# Patient Record
Sex: FEMALE | Race: WHITE
[De-identification: names, ages, dates, MRNs, and addresses within clinical notes are randomized per-mention and may not be internally consistent; named-entity substitution may affect disease eponyms.]

---

## 2019-11-09 ENCOUNTER — HOSPITAL ENCOUNTER (INPATIENT)
Dept: HOSPITAL 47 - EC | Age: 79
LOS: 3 days | Discharge: HOME | DRG: 433 | End: 2019-11-12
Attending: HOSPITALIST | Admitting: HOSPITALIST
Payer: MEDICARE

## 2019-11-09 VITALS — BODY MASS INDEX: 33.4 KG/M2

## 2019-11-09 DIAGNOSIS — E11.9: ICD-10-CM

## 2019-11-09 DIAGNOSIS — Z92.21: ICD-10-CM

## 2019-11-09 DIAGNOSIS — E83.42: ICD-10-CM

## 2019-11-09 DIAGNOSIS — R18.8: ICD-10-CM

## 2019-11-09 DIAGNOSIS — I10: ICD-10-CM

## 2019-11-09 DIAGNOSIS — Z80.0: ICD-10-CM

## 2019-11-09 DIAGNOSIS — F32.9: ICD-10-CM

## 2019-11-09 DIAGNOSIS — Z79.899: ICD-10-CM

## 2019-11-09 DIAGNOSIS — D50.9: ICD-10-CM

## 2019-11-09 DIAGNOSIS — E87.70: ICD-10-CM

## 2019-11-09 DIAGNOSIS — Z79.84: ICD-10-CM

## 2019-11-09 DIAGNOSIS — Z90.11: ICD-10-CM

## 2019-11-09 DIAGNOSIS — Z90.49: ICD-10-CM

## 2019-11-09 DIAGNOSIS — Z82.49: ICD-10-CM

## 2019-11-09 DIAGNOSIS — K74.60: Primary | ICD-10-CM

## 2019-11-09 DIAGNOSIS — E88.09: ICD-10-CM

## 2019-11-09 DIAGNOSIS — R16.1: ICD-10-CM

## 2019-11-09 DIAGNOSIS — Z90.12: ICD-10-CM

## 2019-11-09 DIAGNOSIS — E66.9: ICD-10-CM

## 2019-11-09 DIAGNOSIS — Z71.3: ICD-10-CM

## 2019-11-09 DIAGNOSIS — Z92.3: ICD-10-CM

## 2019-11-09 DIAGNOSIS — D61.818: ICD-10-CM

## 2019-11-09 DIAGNOSIS — J91.8: ICD-10-CM

## 2019-11-09 LAB
ALBUMIN SERPL-MCNC: 3.2 G/DL (ref 3.5–5)
ALP SERPL-CCNC: 63 U/L (ref 38–126)
ALT SERPL-CCNC: 28 U/L (ref 9–52)
ANION GAP SERPL CALC-SCNC: 6 MMOL/L
APTT BLD: 25.1 SEC (ref 22–30)
AST SERPL-CCNC: 30 U/L (ref 14–36)
BASOPHILS # BLD AUTO: 0 K/UL (ref 0–0.2)
BASOPHILS NFR BLD AUTO: 0 %
BUN SERPL-SCNC: 9 MG/DL (ref 7–17)
CALCIUM SPEC-MCNC: 9.4 MG/DL (ref 8.4–10.2)
CHLORIDE SERPL-SCNC: 102 MMOL/L (ref 98–107)
CO2 SERPL-SCNC: 32 MMOL/L (ref 22–30)
EOSINOPHIL # BLD AUTO: 0.1 K/UL (ref 0–0.7)
EOSINOPHIL NFR BLD AUTO: 2 %
ERYTHROCYTE [DISTWIDTH] IN BLOOD BY AUTOMATED COUNT: 4.59 M/UL (ref 3.8–5.4)
ERYTHROCYTE [DISTWIDTH] IN BLOOD: 15.2 % (ref 11.5–15.5)
GLUCOSE BLD-MCNC: 161 MG/DL (ref 75–99)
GLUCOSE BLD-MCNC: 161 MG/DL (ref 75–99)
GLUCOSE SERPL-MCNC: 178 MG/DL (ref 74–99)
HCT VFR BLD AUTO: 36.2 % (ref 34–46)
HGB BLD-MCNC: 11.4 GM/DL (ref 11.4–16)
INR PPP: 1.2 (ref ?–1.2)
LYMPHOCYTES # SPEC AUTO: 0.5 K/UL (ref 1–4.8)
LYMPHOCYTES NFR SPEC AUTO: 13 %
MAGNESIUM SPEC-SCNC: 1.1 MG/DL (ref 1.6–2.3)
MCH RBC QN AUTO: 24.9 PG (ref 25–35)
MCHC RBC AUTO-ENTMCNC: 31.6 G/DL (ref 31–37)
MCV RBC AUTO: 78.9 FL (ref 80–100)
MONOCYTES # BLD AUTO: 0.3 K/UL (ref 0–1)
MONOCYTES NFR BLD AUTO: 8 %
NEUTROPHILS # BLD AUTO: 3 K/UL (ref 1.3–7.7)
NEUTROPHILS NFR BLD AUTO: 76 %
PLATELET # BLD AUTO: 101 K/UL (ref 150–450)
POTASSIUM SERPL-SCNC: 3.6 MMOL/L (ref 3.5–5.1)
PROT SERPL-MCNC: 5.8 G/DL (ref 6.3–8.2)
PT BLD: 12.7 SEC (ref 9–12)
SODIUM SERPL-SCNC: 140 MMOL/L (ref 137–145)
WBC # BLD AUTO: 4 K/UL (ref 3.8–10.6)

## 2019-11-09 PROCEDURE — 85730 THROMBOPLASTIN TIME PARTIAL: CPT

## 2019-11-09 PROCEDURE — 82103 ALPHA-1-ANTITRYPSIN TOTAL: CPT

## 2019-11-09 PROCEDURE — 71046 X-RAY EXAM CHEST 2 VIEWS: CPT

## 2019-11-09 PROCEDURE — 88108 CYTOPATH CONCENTRATE TECH: CPT

## 2019-11-09 PROCEDURE — 99285 EMERGENCY DEPT VISIT HI MDM: CPT

## 2019-11-09 PROCEDURE — 82728 ASSAY OF FERRITIN: CPT

## 2019-11-09 PROCEDURE — 88341 IMHCHEM/IMCYTCHM EA ADD ANTB: CPT

## 2019-11-09 PROCEDURE — 87205 SMEAR GRAM STAIN: CPT

## 2019-11-09 PROCEDURE — 80053 COMPREHEN METABOLIC PANEL: CPT

## 2019-11-09 PROCEDURE — 96375 TX/PRO/DX INJ NEW DRUG ADDON: CPT

## 2019-11-09 PROCEDURE — 84165 PROTEIN E-PHORESIS SERUM: CPT

## 2019-11-09 PROCEDURE — 86038 ANTINUCLEAR ANTIBODIES: CPT

## 2019-11-09 PROCEDURE — 96366 THER/PROPH/DIAG IV INF ADDON: CPT

## 2019-11-09 PROCEDURE — 83540 ASSAY OF IRON: CPT

## 2019-11-09 PROCEDURE — 83690 ASSAY OF LIPASE: CPT

## 2019-11-09 PROCEDURE — 83516 IMMUNOASSAY NONANTIBODY: CPT

## 2019-11-09 PROCEDURE — 83550 IRON BINDING TEST: CPT

## 2019-11-09 PROCEDURE — 83735 ASSAY OF MAGNESIUM: CPT

## 2019-11-09 PROCEDURE — 82042 OTHER SOURCE ALBUMIN QUAN EA: CPT

## 2019-11-09 PROCEDURE — 36415 COLL VENOUS BLD VENIPUNCTURE: CPT

## 2019-11-09 PROCEDURE — 96365 THER/PROPH/DIAG IV INF INIT: CPT

## 2019-11-09 PROCEDURE — 89050 BODY FLUID CELL COUNT: CPT

## 2019-11-09 PROCEDURE — 85025 COMPLETE CBC W/AUTO DIFF WBC: CPT

## 2019-11-09 PROCEDURE — 83880 ASSAY OF NATRIURETIC PEPTIDE: CPT

## 2019-11-09 PROCEDURE — 93306 TTE W/DOPPLER COMPLETE: CPT

## 2019-11-09 PROCEDURE — 84484 ASSAY OF TROPONIN QUANT: CPT

## 2019-11-09 PROCEDURE — 82105 ALPHA-FETOPROTEIN SERUM: CPT

## 2019-11-09 PROCEDURE — 82390 ASSAY OF CERULOPLASMIN: CPT

## 2019-11-09 PROCEDURE — 82607 VITAMIN B-12: CPT

## 2019-11-09 PROCEDURE — 87070 CULTURE OTHR SPECIMN AEROBIC: CPT

## 2019-11-09 PROCEDURE — 86039 ANTINUCLEAR ANTIBODIES (ANA): CPT

## 2019-11-09 PROCEDURE — 85610 PROTHROMBIN TIME: CPT

## 2019-11-09 PROCEDURE — 93005 ELECTROCARDIOGRAM TRACING: CPT

## 2019-11-09 PROCEDURE — 74177 CT ABD & PELVIS W/CONTRAST: CPT

## 2019-11-09 PROCEDURE — 49083 ABD PARACENTESIS W/IMAGING: CPT

## 2019-11-09 PROCEDURE — 86376 MICROSOMAL ANTIBODY EACH: CPT

## 2019-11-09 PROCEDURE — 88342 IMHCHEM/IMCYTCHM 1ST ANTB: CPT

## 2019-11-09 PROCEDURE — 87075 CULTR BACTERIA EXCEPT BLOOD: CPT

## 2019-11-09 PROCEDURE — 88305 TISSUE EXAM BY PATHOLOGIST: CPT

## 2019-11-09 PROCEDURE — 84157 ASSAY OF PROTEIN OTHER: CPT

## 2019-11-09 PROCEDURE — 80048 BASIC METABOLIC PNL TOTAL CA: CPT

## 2019-11-09 RX ADMIN — MAGNESIUM SULFATE IN DEXTROSE SCH MLS/HR: 10 INJECTION, SOLUTION INTRAVENOUS at 12:59

## 2019-11-09 RX ADMIN — MAGNESIUM SULFATE IN DEXTROSE SCH MLS/HR: 10 INJECTION, SOLUTION INTRAVENOUS at 14:09

## 2019-11-09 RX ADMIN — HEPARIN SODIUM SCH UNIT: 5000 INJECTION, SOLUTION INTRAVENOUS; SUBCUTANEOUS at 20:10

## 2019-11-09 RX ADMIN — FUROSEMIDE SCH MG: 10 INJECTION, SOLUTION INTRAMUSCULAR; INTRAVENOUS at 20:10

## 2019-11-09 RX ADMIN — FAMOTIDINE SCH MG: 10 INJECTION, SOLUTION INTRAVENOUS at 20:10

## 2019-11-09 NOTE — XR
EXAMINATION TYPE: XR chest 2V

 

DATE OF EXAM: 11/9/2019

 

HISTORY: Chest Pain.

 

REFERENCE: NONE.

 

FINDINGS: Heart size is upper limits of normal.

 

There is bibasilar airspace disease. There are bilateral effusions, greater on the left than the righ
t.

 

IMPRESSION: 

1. BORDERLINE CARDIOMEGALY.

2. BIBASILAR AIRSPACE DISEASE.

3. BILATERAL EFFUSIONS, GREATER ON THE LEFT THAN THE RIGHT.

## 2019-11-09 NOTE — CT
EXAMINATION TYPE: CT abdomen pelvis w con

 

DATE OF EXAM: 11/9/2019

 

REFERENCE: NONE

 

HISTORY: gen. pain/distention

HISTORY: General pain and distention

 

CT DLP: 2005.6 mGy

Automated exposure control for dose reduction was used.

 

TECHNIQUE: Helical acquisition through the abdomen and pelvis was obtained following the oral ingesti
on of without Oral Contrast and following intravenous administration of 100 mL of Isovue 300. The kimberly
a was reformatted in axial, coronal and sagittal projections.

 

FINDINGS:  There are bilateral pleural effusions, greater on the left than the right. There is relaxa
tion atelectasis both lung bases, greater on the left than the right. The heart is mildly enlarged. T
here is no pericardial fluid. There is coronary artery calcification as well as other vascular calcif
ications.

 

There is diffuse anasarca throughout the study but most marked in the lower abdomen and pelvis.

 

There are umbilical varices. There are also gastrosplenic varices. There are small paraesophageal kenney
ices.

 

There is moderate ascites.

 

The gallbladder is been removed. The liver is nodule in keeping with cirrhosis. The spleen is upper l
imits of normal in size measuring 14 cm the liver is normal in size.

 

Both adrenal glands are normal.

 

Both kidneys demonstrate function and appear morphologically normal.

 

The pancreas is unremarkable.

 

There is no significant retroperitoneal, iliac or inguinal adenopathy.

 

The bladder is unremarkable.

 

There are calcifications associated with the uterus. The ovaries are not seen with certainty.

 

There is no significant diverticular change and there is no radiographic evidence of diverticulitis. 
The appendix is not seen with certainty.

 

Small bowel loops are normal in caliber.

 

No free air is seen.

 

There is degenerative disc disease at L5-S1. There is mild facet arthropathy and hypertrophic spondyl
osis in the lower dorsal spine.

 

IMPRESSION: 

1. EVIDENCE OF CIRRHOSIS AND MARKED ASCITES.

2. 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

3. MILD CARDIOMEGALY.

4. BILATERAL PLEURAL EFFUSIONS, GREATER ON THE LEFT THAN THE RIGHT.

5. DIFFUSE ANASARCA.

6. DEGENERATIVE CHANGES WITHIN THE SPINE.

## 2019-11-09 NOTE — ECHOF
Referral Reason:CHF



MEASUREMENTS

--------

HEIGHT: 167.6 cm

WEIGHT: 104.3 kg

BP: 

RVIDd:   3.5 cm     (< 3.3)

IVSd:   1.3 cm     (0.6 - 1.1)

LVIDd:   4.1 cm     (3.9 - 5.3)

LVPWd:   1.1 cm     (0.6 - 1.1)

IVSs:   2.0 cm

LVIDs:   1.6 cm

LVPWs:   1.6 cm

Ao Diam:   2.9 cm     (2.0 - 3.7)

AV Cusp:   1.3 cm     (1.5 - 2.6)

LA Diam:   4.1 cm     (2.7 - 3.8)

MV EXCURSION:   7.495 mm     (> 18.000)

MV EF SLOPE:   48 mm/s     (70 - 150)

EPSS:   0.6 cm

MV E Henry:   1.01 m/s

MV DecT:   153 ms

MV A Henry:   1.49 m/s

MV E/A Ratio:   0.68 

AV maxP.34 mmHg

AV meanP.65 mmHg

RAP:   5.00 mmHg

RVSP:   46.31 mmHg







FINDINGS

--------

Resting tachycardia (HR>100bpm).

This was a technically difficult study with suboptimal views.

The left ventricular size is normal.   There is mild concentric left ventricular hypertrophy.   Overa
ll left ventricular systolic function is normal with, an EF between 55 - 60 %.

The right ventricle is mildly enlarged.

The left atrium is mildly dilated.

The right atrial size is normal.

5.0mg of Lumason was utilized for enhancement of images

Aortic valve is trileaflet and is mildly thickened.   There is mild aortic stenosis present.   Peak/m
aranza gradient across the Aortic Valve is 25.34mmHg / 14.65mmHg.

The mitral valve was not well visualized.   Mild mitral regurgitation is present.

The tricuspid valve was not well visualized.   Mild tricuspid regurgitation present.   There is mild 
pulmonary hypertension.

There is no pulmonic regurgitation present.

The aortic root size is normal.

IVC Not well visulized.

There is no pericardial effusion.



CONCLUSIONS

--------

1. Resting tachycardia (HR>100bpm).

2. This was a technically difficult study with suboptimal views.

3. The left ventricular size is normal.

4. There is mild concentric left ventricular hypertrophy.

5. Overall left ventricular systolic function is normal with, an EF between 55 - 60 %.

6. The right ventricle is mildly enlarged.

7. The left atrium is mildly dilated.

8. The right atrial size is normal.

9. 5.0mg of Lumason was utilized for enhancement of images

10. Aortic valve is trileaflet and is mildly thickened.

11. There is mild aortic stenosis present.

12. Peak/mean gradient across the Aortic Valve is 25.34mmHg / 14.65mmHg.

13. The mitral valve was not well visualized.

14. Mild mitral regurgitation is present.

15. The tricuspid valve was not well visualized.

16. Mild tricuspid regurgitation present.

17. There is mild pulmonary hypertension.

18. There is no pulmonic regurgitation present.

19. The aortic root size is normal.

20. IVC Not well visulized.

21. There is no pericardial effusion.





SONOGRAPHER: Devora Ruvalcaba RDCS

## 2019-11-09 NOTE — ED
Abdominal Pain HPI





- General


Chief Complaint: Abdominal Pain


Stated Complaint: Stomach pain


Time Seen by Provider: 11/09/19 10:20


Source: patient, family, RN notes reviewed


Mode of arrival: wheelchair


Limitations: no limitations





- History of Present Illness


Initial Comments: 





77 yo female history of hypertension diabetes presenting with 6 month history of

generalized abdominal pain, fluid overload and mild dyspnea  Patient has a 

general discomfort which is constant in nature throughout her entire abdomen.  

She also reports abdominal distention and bloating.  She's had decreased 

appetite.  She's had nausea without vomiting.  She's had intermittent solid 

stool and diarrhea for the past 6 months.  Denies any rectal bleeding.  Denies 

generalized weight loss.  Denies fever or chills.  Denies chest pain.  Denies 

flank pain or dysuria.





- Related Data


                                Home Medications











 Medication  Instructions  Recorded  Confirmed


 


Cholecalciferol (Vitamin D3) 2,000 unit PO DAILY 11/09/19 11/09/19





[Vitamin D3]   


 


Lisinopril [Zestril] 10 mg PO DAILY 11/09/19 11/09/19


 


Pioglitazone [Actos] 30 mg PO DAILY 11/09/19 11/09/19


 


Sertraline [Zoloft] 50 mg PO DAILY 11/09/19 11/09/19


 


metFORMIN HCL 1,000 mg PO BID 11/09/19 11/09/19


 


sitaGLIPtin PHOSPHATE [Januvia] 100 mg PO DAILY 11/09/19 11/09/19











                                    Allergies











Allergy/AdvReac Type Severity Reaction Status Date / Time


 


No Known Allergies Allergy   Verified 11/09/19 12:51














Review of Systems


ROS Statement: 


Those systems with pertinent positive or pertinent negative responses have been 

documented in the HPI.





ROS Other: All systems not noted in ROS Statement are negative.





Past Medical History


Past Medical History: Cancer, Diabetes Mellitus, Hypertension


Additional Past Medical History / Comment(s): breast ca,


History of Any Multi-Drug Resistant Organisms: None Reported


Past Surgical History: Breast Surgery, Cholecystectomy


Past Psychological History: Depression


Smoking Status: Never smoker


Past Alcohol Use History: None Reported


Past Drug Use History: None Reported





General Exam


Limitations: no limitations


General appearance: alert, in no apparent distress


Head exam: Present: atraumatic, normocephalic


Eye exam: Present: normal appearance, PERRL


ENT exam: Present: normal exam, normal oropharynx


Neck exam: Present: normal inspection.  Absent: tenderness, meningismus


Respiratory exam: Present: normal lung sounds bilaterally.  Absent: respiratory 

distress, wheezes, rales, rhonchi


Cardiovascular Exam: Present: tachycardia, irregular rhythm


GI/Abdominal exam: Present: soft, distended, tenderness (Very mild generalized 

tenderness palpation).  Absent: guarding, rebound


Extremities exam: Present: pedal edema (1+ pedal bilaterally)


Neurological exam: Present: alert, oriented X3, CN II-XII intact.  Absent: motor

 sensory deficit


Psychiatric exam: Present: normal affect, normal mood


Skin exam: Present: warm, dry, intact.  Absent: cyanosis, diaphoretic





Course


                                   Vital Signs











  11/09/19





  10:11


 


Temperature 98.1 F


 


Pulse Rate 112 H


 


Respiratory 18





Rate 


 


Blood Pressure 141/66


 


O2 Sat by Pulse 98





Oximetry 














- Reevaluation(s)


Reevaluation #1: 





11/09/19 1028


EKG: Sinus tachycardia with PAC no ST segment elevation, rate of 122, SC 

interval 160, QRS duration 72, 





Medical Decision Making





- Medical Decision Making





78 -year-old female with abdominal distention, peripheral edema, mild dyspnea.  

On exam patient does appear fluid overloaded.  She has no focal abdominal 

tenderness.  She has bilateral peripheral edema.  Chest x-rays obtained which is

 consistent with fluid overload, bilateral effusions.  CT the abdomen is 

performed which does show ascites, no other acute findings.  Laboratory studies 

reveal hypomagnesemia, mild hypoalbuminemia.  Patient will be admitted for 

diuresis, echo will be obtained to evaluate for cardiogenic edema and congestive

 heart failure.  Patient and family agreeable with plan.  Case discussed with 

admitting physician.





- Lab Data


Result diagrams: 


                                 11/09/19 11:03 11/09/19 11:03


                                   Lab Results











  11/09/19 11/09/19 11/09/19 Range/Units





  11:03 11:03 11:03 


 


WBC  4.0    (3.8-10.6)  k/uL


 


RBC  4.59    (3.80-5.40)  m/uL


 


Hgb  11.4    (11.4-16.0)  gm/dL


 


Hct  36.2    (34.0-46.0)  %


 


MCV  78.9 L    (80.0-100.0)  fL


 


MCH  24.9 L    (25.0-35.0)  pg


 


MCHC  31.6    (31.0-37.0)  g/dL


 


RDW  15.2    (11.5-15.5)  %


 


Plt Count  101 L    (150-450)  k/uL


 


Neutrophils %  76    %


 


Lymphocytes %  13    %


 


Monocytes %  8    %


 


Eosinophils %  2    %


 


Basophils %  0    %


 


Neutrophils #  3.0    (1.3-7.7)  k/uL


 


Lymphocytes #  0.5 L    (1.0-4.8)  k/uL


 


Monocytes #  0.3    (0-1.0)  k/uL


 


Eosinophils #  0.1    (0-0.7)  k/uL


 


Basophils #  0.0    (0-0.2)  k/uL


 


Hypochromasia  Slight    


 


PT     (9.0-12.0)  sec


 


INR     (<1.2)  


 


APTT     (22.0-30.0)  sec


 


Sodium   140   (137-145)  mmol/L


 


Potassium   3.6   (3.5-5.1)  mmol/L


 


Chloride   102   ()  mmol/L


 


Carbon Dioxide   32 H   (22-30)  mmol/L


 


Anion Gap   6   mmol/L


 


BUN   9   (7-17)  mg/dL


 


Creatinine   0.57   (0.52-1.04)  mg/dL


 


Est GFR (CKD-EPI)AfAm   >90   (>60 ml/min/1.73 sqM)  


 


Est GFR (CKD-EPI)NonAf   >90   (>60 ml/min/1.73 sqM)  


 


Glucose   178 H   (74-99)  mg/dL


 


Calcium   9.4   (8.4-10.2)  mg/dL


 


Magnesium   1.1 L   (1.6-2.3)  mg/dL


 


Total Bilirubin   1.1   (0.2-1.3)  mg/dL


 


AST   30   (14-36)  U/L


 


ALT   28   (9-52)  U/L


 


Alkaline Phosphatase   63   ()  U/L


 


Troponin I     (0.000-0.034)  ng/mL


 


NT-Pro-B Natriuret Pep    778  pg/mL


 


Total Protein   5.8 L   (6.3-8.2)  g/dL


 


Albumin   3.2 L   (3.5-5.0)  g/dL


 


Lipase   108   ()  U/L














  11/09/19 11/09/19 Range/Units





  11:03 11:03 


 


WBC    (3.8-10.6)  k/uL


 


RBC    (3.80-5.40)  m/uL


 


Hgb    (11.4-16.0)  gm/dL


 


Hct    (34.0-46.0)  %


 


MCV    (80.0-100.0)  fL


 


MCH    (25.0-35.0)  pg


 


MCHC    (31.0-37.0)  g/dL


 


RDW    (11.5-15.5)  %


 


Plt Count    (150-450)  k/uL


 


Neutrophils %    %


 


Lymphocytes %    %


 


Monocytes %    %


 


Eosinophils %    %


 


Basophils %    %


 


Neutrophils #    (1.3-7.7)  k/uL


 


Lymphocytes #    (1.0-4.8)  k/uL


 


Monocytes #    (0-1.0)  k/uL


 


Eosinophils #    (0-0.7)  k/uL


 


Basophils #    (0-0.2)  k/uL


 


Hypochromasia    


 


PT  12.7 H   (9.0-12.0)  sec


 


INR  1.2 H   (<1.2)  


 


APTT  25.1   (22.0-30.0)  sec


 


Sodium    (137-145)  mmol/L


 


Potassium    (3.5-5.1)  mmol/L


 


Chloride    ()  mmol/L


 


Carbon Dioxide    (22-30)  mmol/L


 


Anion Gap    mmol/L


 


BUN    (7-17)  mg/dL


 


Creatinine    (0.52-1.04)  mg/dL


 


Est GFR (CKD-EPI)AfAm    (>60 ml/min/1.73 sqM)  


 


Est GFR (CKD-EPI)NonAf    (>60 ml/min/1.73 sqM)  


 


Glucose    (74-99)  mg/dL


 


Calcium    (8.4-10.2)  mg/dL


 


Magnesium    (1.6-2.3)  mg/dL


 


Total Bilirubin    (0.2-1.3)  mg/dL


 


AST    (14-36)  U/L


 


ALT    (9-52)  U/L


 


Alkaline Phosphatase    ()  U/L


 


Troponin I   <0.012  (0.000-0.034)  ng/mL


 


NT-Pro-B Natriuret Pep    pg/mL


 


Total Protein    (6.3-8.2)  g/dL


 


Albumin    (3.5-5.0)  g/dL


 


Lipase    ()  U/L














Disposition


Clinical Impression: 


 Abdominal pain, Liver cirrhosis, Fluid overload





Disposition: ADMITTED AS IP TO THIS HOSP


Condition: Stable


Is patient prescribed a controlled substance at d/c from ED?: No


Referrals: 


Jordan Malave MD [Primary Care Provider] - 1-2 days


Decision to Admit Reason: Admit from EC


Decision Date: 11/09/19


Decision Time: 13:42

## 2019-11-09 NOTE — P.HPIM
History of Present Illness





This is a pleasant 78 years old female with past medical history of diabetes 

mellitus, hypertension, breast cancer status post right breast lump removal.  

Presents because of abdominal pain, fluid overload and mild dyspnea, pt states 

she is coming because of worsening abd pain , central radiating on both sides , 

non specific, about 8/10 , now 2/10 in severity , associated with nausea but not

vomiting , and some dyspnea , no fever 


she has regular bowel movement compared to yesterday which was more loose. pt 

denies chest pain or dyspnea


pt has no known liver , kidney or heart disease before 





Vital showing tachycardia with heart rate 100 to 112, blood pressure 125/52, 

patient is afebrile and she is saturating 92 on room air.  Labs showing 

unremarkable CBC, INR and BMP.  Magnesium is low at 1.1


CT of the abdomen and pelvis: Anasarca with bilateral pleural effusion and 

ascites.  Echo showed ejection fraction of 55-60%.  EKG showed sinus tachycardia

at 122 with no significant ST-T changes.  Q-wave in the inferior leads III and 

aVF.  Chest x-ray showed bilateral pleural effusion more on the left side














Review of Systems





CONSTITUTIONAL: No fever, no malaise, no fatigue. 


HEENT: No recent visual problems or hearing problems. Denied any sore throat. 


CARDIOVASCULAR: No  orthopnea, PND, no palpitations, no syncope. 


PULMONARY: No shortness of breath, no cough, no hemoptysis. 


GASTROINTESTINAL: No diarrhea, no nausea, no vomiting, no abdominal pain. 

Normoactive bowel sounds. 


NEUROLOGICAL: No headaches, no weakness, no numbness. 


HEMATOLOGICAL: Denies any bleeding or petechiae. 


GENITOURINARY: Denies any burning micturition, frequency, or urgency. 


MUSCULOSKELETAL/RHEUMATOLOGICAL: Denies any joint pain, swelling, or any muscle 

pain. 


ENDOCRINE: Denies any polyuria or polydipsia.








Past Medical History


Past Medical History: Cancer, Diabetes Mellitus, Hypertension


Additional Past Medical History / Comment(s): breast ca,


History of Any Multi-Drug Resistant Organisms: None Reported


Past Surgical History: Breast Surgery, Cholecystectomy


Additional Past Surgical History / Comment(s): Right breast lump removal


Past Anesthesia/Blood Transfusion Reactions: No Reported Reaction


Past Psychological History: Depression


Smoking Status: Never smoker


Past Alcohol Use History: None Reported


Past Drug Use History: None Reported





- Past Family History


  ** Father


Family Medical History: Cancer


Additional Family Medical History / Comment(s): Colon cancer





  ** Brother(s)


Family Medical History: Myocardial Infarction (MI)


Additional Family Medical History / Comment(s): stents, another brother passed 

away from an MI





Medications and Allergies


                                Home Medications











 Medication  Instructions  Recorded  Confirmed  Type


 


Cholecalciferol (Vitamin D3) 2,000 unit PO DAILY 11/09/19 11/09/19 History





[Vitamin D3]    


 


Lisinopril [Zestril] 10 mg PO DAILY 11/09/19 11/09/19 History


 


Pioglitazone [Actos] 30 mg PO DAILY 11/09/19 11/09/19 History


 


Sertraline [Zoloft] 50 mg PO DAILY 11/09/19 11/09/19 History


 


metFORMIN HCL 1,000 mg PO BID 11/09/19 11/09/19 History


 


sitaGLIPtin PHOSPHATE [Januvia] 100 mg PO DAILY 11/09/19 11/09/19 History








                                    Allergies











Allergy/AdvReac Type Severity Reaction Status Date / Time


 


No Known Allergies Allergy   Verified 11/09/19 12:51














Physical Exam


Vitals: 


                                   Vital Signs











  Temp Pulse Resp BP Pulse Ox


 


 11/09/19 14:00   100  18  125/52  92 L


 


 11/09/19 13:30   106 H  18  108/51  94 L


 


 11/09/19 13:00   105 H  18  160/67  91 L


 


 11/09/19 12:30   104 H  20  162/69  92 L


 


 11/09/19 10:11  98.1 F  112 H  18  141/66  98








                                Intake and Output











 11/09/19 11/09/19 11/09/19





 06:59 14:59 22:59


 


Other:   


 


  Voiding Method  Toilet 


 


  Weight  100.561 kg 

















GENERAL: The patient is alert and oriented x3, not in any acute distress. obese


HEENT: Pupils are round and equally reacting to light. EOMI. No scleral icterus.

 No conjunctival pallor. Normocephalic, atraumatic. No pharyngeal erythema. No 

thyromegaly. 


CARDIOVASCULAR: S1 and S2 present. No murmurs, rubs, or gallops. 


PULMONARY: Chest is clear to auscultation, no wheezing or crackles. 


-ABDOMEN: Soft, mild RUQ tenderness,  nondistended, normoactive bowel sounds. No

 palpable organomegaly. 


MUSCULOSKELETAL: No joint swelling or deformity. 


EXTREMITIES: No cyanosis, clubbing, or pedal edema. 


NEUROLOGICAL: Gross neurological examination did not reveal any focal deficits. 


SKIN: No rashes. No petechiae








Results


CBC & Chem 7: 


                                 11/09/19 11:03





                                 11/09/19 11:03


Labs: 


                  Abnormal Lab Results - Last 24 Hours (Table)











  11/09/19 11/09/19 11/09/19 Range/Units





  11:03 11:03 11:03 


 


MCV  78.9 L    (80.0-100.0)  fL


 


MCH  24.9 L    (25.0-35.0)  pg


 


Plt Count  101 L    (150-450)  k/uL


 


Lymphocytes #  0.5 L    (1.0-4.8)  k/uL


 


PT    12.7 H  (9.0-12.0)  sec


 


INR    1.2 H  (<1.2)  


 


Carbon Dioxide   32 H   (22-30)  mmol/L


 


Glucose   178 H   (74-99)  mg/dL


 


Magnesium   1.1 L   (1.6-2.3)  mg/dL


 


Total Protein   5.8 L   (6.3-8.2)  g/dL


 


Albumin   3.2 L   (3.5-5.0)  g/dL














Thrombosis Risk Factor Assmnt





- Choose All That Apply


Each Risk Factor Represents 3 Points: Age 75 years or older


Other congenital or acquired thrombophilia - If yes, enter type in comment: No


Thrombosis Risk Factor Assessment Total Risk Factor Score: 3


Thrombosis Risk Factor Assessment Level: Moderate Risk





Assessment and Plan


Assessment: 





Diffuse anasarca With bilateral pleural effusion, more on the left,moderate 

ascites


Liver cirrhosis


Hypomagnesemia


Borderline Enlarged spleen 


diabetes mellitus


Hypertension


History of breast cancer status post lumpectomy 








Plan: 





This is a pleasant 78 years old female who presents with anasarca, with pleural 

effusion and ascites mostly secondary to liver cirrhosis with normal ejection 

fraction and creatinine.  Continue with diuretics.  Monitor electrolytes.  Con

sult GI team 


Labs and medication were reviewed..  Continue same treatment.  Continue with 

symptomatic treatment.  Resume home medication.  Monitor lytes and vitals.  DVT 

and GI prophylaxis.  Further recommendations of the clinical course of the 

patient


DVT prophylaxis: Subcutaneous heparin


GI Prophylaxis: Pepcid


PT/OT: Pending


Prognosis is guarded

## 2019-11-10 LAB
ANION GAP SERPL CALC-SCNC: 4 MMOL/L
BUN SERPL-SCNC: 9 MG/DL (ref 7–17)
CALCIUM SPEC-MCNC: 8.6 MG/DL (ref 8.4–10.2)
CELLS COUNTED: 100
CHLORIDE SERPL-SCNC: 100 MMOL/L (ref 98–107)
CO2 SERPL-SCNC: 36 MMOL/L (ref 22–30)
EOSINOPHIL # BLD MANUAL: 0.08 K/UL (ref 0–0.7)
ERYTHROCYTE [DISTWIDTH] IN BLOOD BY AUTOMATED COUNT: 3.93 M/UL (ref 3.8–5.4)
ERYTHROCYTE [DISTWIDTH] IN BLOOD: 15.3 % (ref 11.5–15.5)
GLUCOSE BLD-MCNC: 149 MG/DL (ref 75–99)
GLUCOSE BLD-MCNC: 154 MG/DL (ref 75–99)
GLUCOSE BLD-MCNC: 171 MG/DL (ref 75–99)
GLUCOSE BLD-MCNC: 178 MG/DL (ref 75–99)
GLUCOSE SERPL-MCNC: 146 MG/DL (ref 74–99)
HCT VFR BLD AUTO: 31.1 % (ref 34–46)
HGB BLD-MCNC: 10.1 GM/DL (ref 11.4–16)
LYMPHOCYTES # BLD MANUAL: 0.28 K/UL (ref 1–4.8)
MAGNESIUM SPEC-SCNC: 1.3 MG/DL (ref 1.6–2.3)
MCH RBC QN AUTO: 25.6 PG (ref 25–35)
MCHC RBC AUTO-ENTMCNC: 32.4 G/DL (ref 31–37)
MCV RBC AUTO: 79.1 FL (ref 80–100)
MONOCYTES # BLD MANUAL: 0.2 K/UL (ref 0–1)
NEUTROPHILS NFR BLD MANUAL: 80 %
PLATELET # BLD AUTO: 78 K/UL (ref 150–450)
POTASSIUM SERPL-SCNC: 3.5 MMOL/L (ref 3.5–5.1)
SODIUM SERPL-SCNC: 140 MMOL/L (ref 137–145)
WBC # BLD AUTO: 2.8 K/UL (ref 3.8–10.6)

## 2019-11-10 RX ADMIN — MAGNESIUM SULFATE IN DEXTROSE SCH MLS/HR: 10 INJECTION, SOLUTION INTRAVENOUS at 09:13

## 2019-11-10 RX ADMIN — LISINOPRIL SCH MG: 10 TABLET ORAL at 09:14

## 2019-11-10 RX ADMIN — MAGNESIUM SULFATE IN DEXTROSE SCH MLS/HR: 10 INJECTION, SOLUTION INTRAVENOUS at 13:29

## 2019-11-10 RX ADMIN — SERTRALINE HYDROCHLORIDE SCH MG: 50 TABLET, FILM COATED ORAL at 09:14

## 2019-11-10 RX ADMIN — Medication SCH UNIT: at 09:14

## 2019-11-10 RX ADMIN — FAMOTIDINE SCH MG: 10 INJECTION, SOLUTION INTRAVENOUS at 20:18

## 2019-11-10 RX ADMIN — HEPARIN SODIUM SCH UNIT: 5000 INJECTION, SOLUTION INTRAVENOUS; SUBCUTANEOUS at 20:18

## 2019-11-10 RX ADMIN — FUROSEMIDE SCH MG: 10 INJECTION, SOLUTION INTRAMUSCULAR; INTRAVENOUS at 09:14

## 2019-11-10 RX ADMIN — FUROSEMIDE SCH MG: 10 INJECTION, SOLUTION INTRAMUSCULAR; INTRAVENOUS at 20:18

## 2019-11-10 RX ADMIN — HEPARIN SODIUM SCH UNIT: 5000 INJECTION, SOLUTION INTRAVENOUS; SUBCUTANEOUS at 09:14

## 2019-11-10 RX ADMIN — LINAGLIPTIN SCH MG: 5 TABLET, FILM COATED ORAL at 09:14

## 2019-11-10 RX ADMIN — FAMOTIDINE SCH MG: 10 INJECTION, SOLUTION INTRAVENOUS at 09:14

## 2019-11-10 RX ADMIN — SPIRONOLACTONE SCH MG: 25 TABLET, FILM COATED ORAL at 13:30

## 2019-11-10 RX ADMIN — MAGNESIUM SULFATE IN DEXTROSE SCH MLS/HR: 10 INJECTION, SOLUTION INTRAVENOUS at 11:49

## 2019-11-10 RX ADMIN — PIOGLITAZONE SCH MG: 30 TABLET ORAL at 09:14

## 2019-11-10 NOTE — P.CONS
History of Present Illness





- Reason for Consult


Consult date: 11/10/19


Cirrhosis


Requesting physician: Sly William





- Chief Complaint


Abdominal pain





- History of Present Illness





78-year-old female with medical history significant for diabetes mellitus, 

hypertension, prior right breast lumpectomy presents to the hospital due to 

complaints of abdominal pain.  The patient reports abdominal pain which has been

present with associated distention which is present for months now.  However 

over the past few weeks this has increased.  She describes symptoms of abdominal

distention and fluid overload.  She also reports associated shortness of breath.

 No fevers chills or other symptoms reported.  She did have some associated 

nausea.  No prior history of liver disease.  She denies any excessive alcohol 

use.  She does suffer from diabetes mellitus and hypertension as stated.  

Computed tomography scan of the abdomen on presentation was significant for 

cirrhosis, ascites as well as umbilical and gastrosplenic varices.  Laboratory 

evaluation significant for hemoglobin 10.1 from 11.4 with microcytic indices, 

platelet count 70,000, WBC 2.8 with a total bilirubin 1.1, alkaline phosphatase 

69, AST 30 and ALT 28.  Currently she is lying in bed reporting improvement in 

abdominal symptoms after starting Lasix IV 40 mg twice daily.








Review of Systems





REVIEW OF SYSTEMS:


CONSTITUTIONAL: Denies any fevers, chills, weight change or fatigue.


CARDIOVASCULAR: Denies any chest pain, palpitations high or low blood pressures


RESPIRATORY: Denies any hemoptysis or cough, but did have some shortness of 

breath associated with her abdominal distention.  


GENITOURINARY:  No dysuria or hematuria. 


MUSCULOSKELETAL: No weakness reported. 


SKIN: Denies any new rashes or lesions, jaundice or pallor. 


PSYCHIATRIC: Denies any depression or anxiety. 


NEUROLOGY: Denies headache, denies any new focal deficits. 


EARS/NOSE/THROAT: No recent hearing change, congestion, nasal discharge or sore 

throat.


EYES: No pain in eyes, discharge or change in vision. 


GASTROINTESTINAL: As per HPI.





Past Medical History


Past Medical History: Cancer, Diabetes Mellitus, Hypertension


Additional Past Medical History / Comment(s): breast ca,


History of Any Multi-Drug Resistant Organisms: None Reported


Past Surgical History: Breast Surgery, Cholecystectomy


Additional Past Surgical History / Comment(s): Right breast lump removal


Past Anesthesia/Blood Transfusion Reactions: No Reported Reaction


Past Psychological History: Depression


Smoking Status: Never smoker


Past Alcohol Use History: None Reported


Past Drug Use History: None Reported





- Past Family History


  ** Father


Family Medical History: Cancer


Additional Family Medical History / Comment(s): Colon cancer





  ** Brother(s)


Family Medical History: Myocardial Infarction (MI)


Additional Family Medical History / Comment(s): stents, another brother passed 

away from an MI





Medications and Allergies


                                Home Medications











 Medication  Instructions  Recorded  Confirmed  Type


 


Cholecalciferol (Vitamin D3) 2,000 unit PO DAILY 11/09/19 11/09/19 History





[Vitamin D3]    


 


Lisinopril [Zestril] 10 mg PO DAILY 11/09/19 11/09/19 History


 


Pioglitazone [Actos] 30 mg PO DAILY 11/09/19 11/09/19 History


 


Sertraline [Zoloft] 50 mg PO DAILY 11/09/19 11/09/19 History


 


metFORMIN HCL 1,000 mg PO BID 11/09/19 11/09/19 History


 


sitaGLIPtin PHOSPHATE [Januvia] 100 mg PO DAILY 11/09/19 11/09/19 History








                                    Allergies











Allergy/AdvReac Type Severity Reaction Status Date / Time


 


No Known Allergies Allergy   Verified 11/09/19 12:51














Physical Exam


Vitals: 


                                   Vital Signs











  Temp Pulse Pulse Resp BP BP BP


 


 11/10/19 08:20  98.3 F   96  16   108/53 


 


 11/10/19 03:57    103 H  17    154/74


 


 11/09/19 23:48    91  17    127/59


 


 11/09/19 20:00  98.2 F   106 H  17    127/60


 


 11/09/19 14:45  98.5 F   100  16   121/57 


 


 11/09/19 14:00   100   18  125/52  


 


 11/09/19 13:30   106 H   18  108/51  


 


 11/09/19 13:00   105 H   18  160/67  


 


 11/09/19 12:30   104 H   20  162/69  


 


 11/09/19 10:11  98.1 F  112 H   18  141/66  














  Pulse Ox


 


 11/10/19 08:20  94 L


 


 11/10/19 03:57  95


 


 11/09/19 23:48  95


 


 11/09/19 20:00  97


 


 11/09/19 14:45  94 L


 


 11/09/19 14:00  92 L


 


 11/09/19 13:30  94 L


 


 11/09/19 13:00  91 L


 


 11/09/19 12:30  92 L


 


 11/09/19 10:11  98








                                Intake and Output











 11/09/19 11/10/19 11/10/19





 22:59 06:59 14:59


 


Output Total 1200 2200 


 


Balance -1200 -2200 


 


Output:   


 


  Urine 1200 2200 


 


Other:   


 


  # Voids 1  2


 


  # Bowel Movements   1


 


  Weight  93.9 kg 














On physical examination, patient appears comfortable in no apparent distress. 


HEAD: Normocephalic, atraumatic. 


EYES: No scleral icterus. No conjunctival injection. 


MOUTH: No lesions, tongue midline. 


NECK: Trachea midline, no gross abnormalities. 


CHEST: Clear to auscultation with no wheezing or rhonchi appreciated. 


HEART: S1-S2 appreciated. 


ABDOMEN: Soft, moderately distended with positive fluid wave. Bowel sounds are p

ositive. No organomegaly.  No guarding or rigidity.


EXTREMITIES: No pedal edema. 


SKIN: No rashes, no jaundice. 


NEUROLOGIC: Alert and oriented x3.  No focal deficits. 





Results


CBC & Chem 7: 


                                 11/10/19 05:46





                                 11/10/19 05:46


Labs: 


                  Abnormal Lab Results - Last 24 Hours (Table)











  11/09/19 11/09/19 11/09/19 Range/Units





  11:03 11:03 11:03 


 


WBC     (3.8-10.6)  k/uL


 


Hgb     (11.4-16.0)  gm/dL


 


Hct     (34.0-46.0)  %


 


MCV  78.9 L    (80.0-100.0)  fL


 


MCH  24.9 L    (25.0-35.0)  pg


 


Plt Count  101 L    (150-450)  k/uL


 


Lymphocytes #  0.5 L    (1.0-4.8)  k/uL


 


Lymphocytes # (Manual)     (1.0-4.8)  k/uL


 


PT    12.7 H  (9.0-12.0)  sec


 


INR    1.2 H  (<1.2)  


 


Carbon Dioxide   32 H   (22-30)  mmol/L


 


Glucose   178 H   (74-99)  mg/dL


 


POC Glucose (mg/dL)     (75-99)  mg/dL


 


Magnesium   1.1 L   (1.6-2.3)  mg/dL


 


Total Protein   5.8 L   (6.3-8.2)  g/dL


 


Albumin   3.2 L   (3.5-5.0)  g/dL














  11/09/19 11/09/19 11/10/19 Range/Units





  16:57 20:32 05:46 


 


WBC     (3.8-10.6)  k/uL


 


Hgb     (11.4-16.0)  gm/dL


 


Hct     (34.0-46.0)  %


 


MCV     (80.0-100.0)  fL


 


MCH     (25.0-35.0)  pg


 


Plt Count     (150-450)  k/uL


 


Lymphocytes #     (1.0-4.8)  k/uL


 


Lymphocytes # (Manual)     (1.0-4.8)  k/uL


 


PT     (9.0-12.0)  sec


 


INR     (<1.2)  


 


Carbon Dioxide    36 H  (22-30)  mmol/L


 


Glucose    146 H  (74-99)  mg/dL


 


POC Glucose (mg/dL)  161 H  161 H   (75-99)  mg/dL


 


Magnesium    1.3 L  (1.6-2.3)  mg/dL


 


Total Protein     (6.3-8.2)  g/dL


 


Albumin     (3.5-5.0)  g/dL














  11/10/19 11/10/19 Range/Units





  05:46 06:02 


 


WBC  2.8 L   (3.8-10.6)  k/uL


 


Hgb  10.1 L   (11.4-16.0)  gm/dL


 


Hct  31.1 L   (34.0-46.0)  %


 


MCV  79.1 L   (80.0-100.0)  fL


 


MCH    (25.0-35.0)  pg


 


Plt Count  78 L   (150-450)  k/uL


 


Lymphocytes #    (1.0-4.8)  k/uL


 


Lymphocytes # (Manual)  0.28 L   (1.0-4.8)  k/uL


 


PT    (9.0-12.0)  sec


 


INR    (<1.2)  


 


Carbon Dioxide    (22-30)  mmol/L


 


Glucose    (74-99)  mg/dL


 


POC Glucose (mg/dL)   149 H  (75-99)  mg/dL


 


Magnesium    (1.6-2.3)  mg/dL


 


Total Protein    (6.3-8.2)  g/dL


 


Albumin    (3.5-5.0)  g/dL











CT scan - abdomen: report reviewed (Findings of ascites and cirrhosis on 

computed tomography scan abdomen)





Assessment and Plan


(1) Liver cirrhosis


Narrative/Plan: 


78-year-old female presenting due to abdominal pain with no findings of liver 

cirrhosis and ascites on computed tomography scan of the abdomen.  No prior 

history of liver disease.  No history of viral hepatitis or decompensated liver 

disease reported.  The patient reports increasing abdominal distention over the 

past few months of unknown etiology.  She is currently not on any home diuresis 

but has been started on Lasix 40 mg twice daily in the hospital.  At this time 

plan is to poor paracentesis with fluid studies to confirm liver etiology of 

ascites, suspicion is for nonalcoholic steatohepatitis as etiology of liver 

disease.


Current Visit: Yes   Status: Acute   Code(s): K74.60 - UNSPECIFIED CIRRHOSIS OF 

LIVER   SNOMED Code(s): 89812970


   





(2) Abdominal pain


Current Visit: Yes   Status: Acute   Code(s): R10.9 - UNSPECIFIED ABDOMINAL PAIN

   SNOMED Code(s): 93163842


   





(3) Fluid overload


Current Visit: Yes   Status: Acute   Code(s): E87.70 - FLUID OVERLOAD, 

UNSPECIFIED   SNOMED Code(s): 29824819


   


Plan: 





Supportive care


Sodium restricted diet


Lasix 40 mg twice a day


Aldactone 25 mg daily added 


Paracentesis with fluid studies ordered


Will order full liver serology to rule out other etiology of her disease, 

however suspicion is for nonalcoholic steatohepatitis


Continue to monitor CBC, CMP and INR


Follow-up in gastroenterology clinic after discharge


Thank you for allowing us to participate in the care of this patient we will 

continue to follow

## 2019-11-10 NOTE — P.PN
Subjective





This is a pleasant 78 years old female with past medical history of diabetes 

mellitus, hypertension, breast cancer status post right breast lump removal.  

Presents because of abdominal pain, fluid overload and mild dyspnea, pt states 

she is coming because of worsening abd pain , central radiating on both sides , 

non specific, about 8/10 , now 2/10 in severity , associated with nausea but not

vomiting , and some dyspnea , no fever 


she has regular bowel movement compared to yesterday which was more loose. pt 

denies chest pain or dyspnea


pt has no known liver , kidney or heart disease before 





Vital showing tachycardia with heart rate 100 to 112, blood pressure 125/52, 

patient is afebrile and she is saturating 92 on room air.  Labs showing 

unremarkable CBC, INR and BMP.  Magnesium is low at 1.1


CT of the abdomen and pelvis: Anasarca with bilateral pleural effusion and 

ascites.  Echo showed ejection fraction of 55-60%.  EKG showed sinus tachycardia

at 122 with no significant ST-T changes.  Q-wave in the inferior leads III and 

aVF.  Chest x-ray showed bilateral pleural effusion more on the left side





11/10/2019


Patient is awake and oriented.  No new complaints.  No chest pain or dyspnea.  

She feels better and better presents with easier mobility.  Her abdominal pain 

is improving, occasional nausea but no vomiting.  Vitals are stable, her CBC 

showing mild pancytopenia with WBC of 2.8, hemoglobin 10.1, platelets at 78, 

magnesium still low with 1.3 which is being replaced, sugar is controlled and 

BMP is unremarkable.  Discussed the case with GI team, plan to add 

spironolactone and paracentesis tomorrow.


Patient was instructed to follow up with her PCP and GI within 1 week after 

discharge, she agrees however she does not want to follow up with her PCP Dr. Malave and she wants to be switched to another PCP, several doctors were 

suggested











Review of systems


CONSTITUTIONAL: No fever, no malaise, no fatigue. 


HEENT: No recent visual problems or hearing problems. Denied any sore throat. 


CARDIOVASCULAR: No  orthopnea, PND, no palpitations, no syncope. 


PULMONARY: No shortness of breath, no cough, no hemoptysis. 


GASTROINTESTINAL: No diarrhea, no nausea, no vomiting, no abdominal pain. 

Normoactive bowel sounds. 


NEUROLOGICAL: No headaches, no weakness, no numbness. 


HEMATOLOGICAL: Denies any bleeding or petechiae. 


GENITOURINARY: Denies any burning micturition, frequency, or urgency. 


MUSCULOSKELETAL/RHEUMATOLOGICAL: Denies any joint pain, swelling, or any muscle 

pain. 


ENDOCRINE: Denies any polyuria or polydipsia.


                               Active Medications











Generic Name Dose Route Start Last Admin





  Trade Name Freq  PRN Reason Stop Dose Admin


 


Cholecalciferol  2,000 unit  11/10/19 09:00  11/10/19 09:14





  Vitamin D3 (25 Mcg = 1000 Iu)  PO   2,000 unit





  DAILY KHUSHI   Administration


 


Famotidine  20 mg  11/09/19 21:00  11/10/19 09:14





  Pepcid  IV   20 mg





  Q12HR KHUSHI   Administration


 


Furosemide  40 mg  11/09/19 21:00  11/10/19 09:14





  Lasix  IV   40 mg





  Q12HR KHUSHI   Administration


 


Heparin Sodium (Porcine)  5,000 unit  11/09/19 21:00  11/10/19 09:14





  Heparin  SQ   5,000 unit





  Q12HR KHUSHI   Administration


 


Linagliptin  5 mg  11/10/19 09:00  11/10/19 09:14





  Tradjenta  PO   5 mg





  DAILY KHUSHI   Administration


 


Lisinopril  10 mg  11/10/19 09:00  11/10/19 09:14





  Zestril  PO   10 mg





  DAILY KHUSHI   Administration


 


Metformin HCl  1,000 mg  11/09/19 17:30  11/10/19 06:32





  Glucophage  PO   1,000 mg





  AC-BID KHUSHI   Administration


 


Miscellaneous Information  1 each  11/10/19 07:35 





  Magnesium Per Protocol  MISCELLANE  





  DAILY PRN  





  Per Protocol  





  Protocol  


 


Naloxone HCl  0.2 mg  11/09/19 13:38 





  Narcan  IV  





  Q2M PRN  





  Opioid Reversal  


 


Pioglitazone HCl  30 mg  11/10/19 09:00  11/10/19 09:14





  Actos  PO   30 mg





  DAILY KHUSHI   Administration


 


Sertraline HCl  50 mg  11/10/19 09:00  11/10/19 09:14





  Zoloft  PO   50 mg





  DAILY KHUSHI   Administration














Objective





- Vital Signs


Vital signs: 


                                   Vital Signs











Temp  98.3 F   11/10/19 08:20


 


Pulse  96   11/10/19 08:20


 


Resp  16   11/10/19 08:20


 


BP  108/53   11/10/19 08:20


 


Pulse Ox  94 L  11/10/19 08:20








                                 Intake & Output











 11/09/19 11/10/19 11/10/19





 18:59 06:59 18:59


 


Output Total  3400 


 


Balance  -3400 


 


Weight 100.561 kg 93.9 kg 


 


Output:   


 


  Urine  3400 


 


Other:   


 


  Voiding Method Toilet  


 


  # Voids 1  2


 


  # Bowel Movements   1














- Exam








GENERAL: The patient is alert and oriented x3, not in any acute distress. obese


HEENT: Pupils are round and equally reacting to light. EOMI. No scleral icterus.

No conjunctival pallor. Normocephalic, atraumatic. No pharyngeal erythema. No 

thyromegaly. 


CARDIOVASCULAR: S1 and S2 present. No murmurs, rubs, or gallops. 


PULMONARY: Chest is clear to auscultation, no wheezing or crackles. 


-ABDOMEN: Soft, mild RUQ tenderness,  nondistended, normoactive bowel sounds. No

palpable organomegaly. 


MUSCULOSKELETAL: No joint swelling or deformity. 


-EXTREMITIES: No cyanosis, clubbing.  Bilateral leg edema


NEUROLOGICAL: Gross neurological examination did not reveal any focal deficits. 


SKIN: No rashes. No petechiae





























- Labs


CBC & Chem 7: 


                                 11/10/19 05:46





                                 11/10/19 05:46


Labs: 


                  Abnormal Lab Results - Last 24 Hours (Table)











  11/09/19 11/09/19 11/09/19 Range/Units





  11:03 11:03 11:03 


 


WBC     (3.8-10.6)  k/uL


 


Hgb     (11.4-16.0)  gm/dL


 


Hct     (34.0-46.0)  %


 


MCV  78.9 L    (80.0-100.0)  fL


 


MCH  24.9 L    (25.0-35.0)  pg


 


Plt Count  101 L    (150-450)  k/uL


 


Lymphocytes #  0.5 L    (1.0-4.8)  k/uL


 


Lymphocytes # (Manual)     (1.0-4.8)  k/uL


 


PT    12.7 H  (9.0-12.0)  sec


 


INR    1.2 H  (<1.2)  


 


Carbon Dioxide   32 H   (22-30)  mmol/L


 


Glucose   178 H   (74-99)  mg/dL


 


POC Glucose (mg/dL)     (75-99)  mg/dL


 


Magnesium   1.1 L   (1.6-2.3)  mg/dL


 


Total Protein   5.8 L   (6.3-8.2)  g/dL


 


Albumin   3.2 L   (3.5-5.0)  g/dL














  11/09/19 11/09/19 11/10/19 Range/Units





  16:57 20:32 05:46 


 


WBC     (3.8-10.6)  k/uL


 


Hgb     (11.4-16.0)  gm/dL


 


Hct     (34.0-46.0)  %


 


MCV     (80.0-100.0)  fL


 


MCH     (25.0-35.0)  pg


 


Plt Count     (150-450)  k/uL


 


Lymphocytes #     (1.0-4.8)  k/uL


 


Lymphocytes # (Manual)     (1.0-4.8)  k/uL


 


PT     (9.0-12.0)  sec


 


INR     (<1.2)  


 


Carbon Dioxide    36 H  (22-30)  mmol/L


 


Glucose    146 H  (74-99)  mg/dL


 


POC Glucose (mg/dL)  161 H  161 H   (75-99)  mg/dL


 


Magnesium    1.3 L  (1.6-2.3)  mg/dL


 


Total Protein     (6.3-8.2)  g/dL


 


Albumin     (3.5-5.0)  g/dL














  11/10/19 11/10/19 Range/Units





  05:46 06:02 


 


WBC  2.8 L   (3.8-10.6)  k/uL


 


Hgb  10.1 L   (11.4-16.0)  gm/dL


 


Hct  31.1 L   (34.0-46.0)  %


 


MCV  79.1 L   (80.0-100.0)  fL


 


MCH    (25.0-35.0)  pg


 


Plt Count  78 L   (150-450)  k/uL


 


Lymphocytes #    (1.0-4.8)  k/uL


 


Lymphocytes # (Manual)  0.28 L   (1.0-4.8)  k/uL


 


PT    (9.0-12.0)  sec


 


INR    (<1.2)  


 


Carbon Dioxide    (22-30)  mmol/L


 


Glucose    (74-99)  mg/dL


 


POC Glucose (mg/dL)   149 H  (75-99)  mg/dL


 


Magnesium    (1.6-2.3)  mg/dL


 


Total Protein    (6.3-8.2)  g/dL


 


Albumin    (3.5-5.0)  g/dL














Assessment and Plan


Assessment: 





Liver cirrhosis


Diffuse anasarca With bilateral pleural effusion, more on the left,moderate 

ascites.  Secondary to above


Hypomagnesemia


Borderline Enlarged spleen 


diabetes mellitus


Hypertension


History of breast cancer status post lumpectomy 








Plan: 





This is a pleasant 78 years old female who presents with anasarca, with pleural 

effusion and ascites mostly secondary to liver cirrhosis with normal ejection 

fraction and creatinine.  Continue with diuretics.  At spironolactone.  Monitor 

electrolytes.  Follow-up recommendation by GI team 


Labs and medication were reviewed..  Continue same treatment.  Continue with 

symptomatic treatment.  Resume home medication.  Monitor lytes and vitals.  DVT 

and GI prophylaxis.  Further recommendations of the clinical course of the 

patient


DVT prophylaxis: Subcutaneous heparin


GI Prophylaxis: Pepcid


PT/OT: Pending


Prognosis is guarded

## 2019-11-11 VITALS — RESPIRATION RATE: 18 BRPM

## 2019-11-11 LAB
AFP-TM SERPL-MCNC: <2.5 NG/ML (ref 0–7.9)
ANION GAP SERPL CALC-SCNC: 5 MMOL/L
BASOPHILS # BLD AUTO: 0 K/UL (ref 0–0.2)
BASOPHILS NFR BLD AUTO: 1 %
BUN SERPL-SCNC: 12 MG/DL (ref 7–17)
CALCIUM SPEC-MCNC: 8.7 MG/DL (ref 8.4–10.2)
CELL CNT PNL FLD: 100
CHLORIDE SERPL-SCNC: 98 MMOL/L (ref 98–107)
CO2 SERPL-SCNC: 37 MMOL/L (ref 22–30)
DEPRECATED POLYS # FLD: 1 %
EOSINOPHIL # BLD AUTO: 0.1 K/UL (ref 0–0.7)
EOSINOPHIL NFR BLD AUTO: 3 %
ERYTHROCYTE [DISTWIDTH] IN BLOOD BY AUTOMATED COUNT: 4.23 M/UL (ref 3.8–5.4)
ERYTHROCYTE [DISTWIDTH] IN BLOOD: 15.6 % (ref 11.5–15.5)
FERRITIN SERPL-MCNC: 22 NG/ML (ref 10–291)
GLUCOSE BLD-MCNC: 148 MG/DL (ref 75–99)
GLUCOSE BLD-MCNC: 166 MG/DL (ref 75–99)
GLUCOSE BLD-MCNC: 174 MG/DL (ref 75–99)
GLUCOSE BLD-MCNC: 188 MG/DL (ref 75–99)
GLUCOSE SERPL-MCNC: 145 MG/DL (ref 74–99)
HCT VFR BLD AUTO: 33.3 % (ref 34–46)
HGB BLD-MCNC: 10.5 GM/DL (ref 11.4–16)
IRON SERPL-MCNC: 22 UG/DL (ref 50–170)
LYMPHOCYTES # SPEC AUTO: 0.6 K/UL (ref 1–4.8)
LYMPHOCYTES NFR SPEC AUTO: 18 %
MAGNESIUM SPEC-SCNC: 1.6 MG/DL (ref 1.6–2.3)
MCH RBC QN AUTO: 24.9 PG (ref 25–35)
MCHC RBC AUTO-ENTMCNC: 31.6 G/DL (ref 31–37)
MCV RBC AUTO: 78.8 FL (ref 80–100)
MONOCYTES # BLD AUTO: 0.3 K/UL (ref 0–1)
MONOCYTES NFR BLD AUTO: 9 %
MONONUC CELLS # FLD: 99 %
NEUTROPHILS # BLD AUTO: 2.3 K/UL (ref 1.3–7.7)
NEUTROPHILS NFR BLD AUTO: 67 %
NUC CELL # FLD: 700 /UL
PLATELET # BLD AUTO: 97 K/UL (ref 150–450)
POTASSIUM SERPL-SCNC: 3.8 MMOL/L (ref 3.5–5.1)
PROT FLD-MCNC: 1649 MG/DL
SODIUM SERPL-SCNC: 140 MMOL/L (ref 137–145)
TIBC SERPL-MCNC: 362 UG/DL (ref 228–460)
WBC # BLD AUTO: 3.4 K/UL (ref 3.8–10.6)

## 2019-11-11 PROCEDURE — 0W9G3ZX DRAINAGE OF PERITONEAL CAVITY, PERCUTANEOUS APPROACH, DIAGNOSTIC: ICD-10-PCS

## 2019-11-11 RX ADMIN — FUROSEMIDE SCH MG: 10 INJECTION, SOLUTION INTRAMUSCULAR; INTRAVENOUS at 09:26

## 2019-11-11 RX ADMIN — FUROSEMIDE SCH MG: 10 INJECTION, SOLUTION INTRAMUSCULAR; INTRAVENOUS at 22:02

## 2019-11-11 RX ADMIN — SPIRONOLACTONE SCH MG: 25 TABLET, FILM COATED ORAL at 09:26

## 2019-11-11 RX ADMIN — LISINOPRIL SCH MG: 10 TABLET ORAL at 09:26

## 2019-11-11 RX ADMIN — LINAGLIPTIN SCH MG: 5 TABLET, FILM COATED ORAL at 09:26

## 2019-11-11 RX ADMIN — HEPARIN SODIUM SCH: 5000 INJECTION, SOLUTION INTRAVENOUS; SUBCUTANEOUS at 08:59

## 2019-11-11 RX ADMIN — HEPARIN SODIUM SCH UNIT: 5000 INJECTION, SOLUTION INTRAVENOUS; SUBCUTANEOUS at 22:02

## 2019-11-11 RX ADMIN — FAMOTIDINE SCH MG: 10 INJECTION, SOLUTION INTRAVENOUS at 09:26

## 2019-11-11 RX ADMIN — FAMOTIDINE SCH MG: 10 INJECTION, SOLUTION INTRAVENOUS at 22:02

## 2019-11-11 RX ADMIN — Medication SCH UNIT: at 09:25

## 2019-11-11 RX ADMIN — SERTRALINE HYDROCHLORIDE SCH MG: 50 TABLET, FILM COATED ORAL at 09:26

## 2019-11-11 RX ADMIN — PIOGLITAZONE SCH MG: 30 TABLET ORAL at 09:25

## 2019-11-11 NOTE — P.PN
Subjective





This is a pleasant 78 years old female with past medical history of diabetes 

mellitus, hypertension, breast cancer status post right breast lump removal.  

Presents because of abdominal pain, fluid overload and mild dyspnea, pt states 

she is coming because of worsening abd pain , central radiating on both sides , 

non specific, about 8/10 , now 2/10 in severity , associated with nausea but not

vomiting , and some dyspnea , no fever 


she has regular bowel movement compared to yesterday which was more loose. pt 

denies chest pain or dyspnea


pt has no known liver , kidney or heart disease before 





Vital showing tachycardia with heart rate 100 to 112, blood pressure 125/52, 

patient is afebrile and she is saturating 92 on room air.  Labs showing 

unremarkable CBC, INR and BMP.  Magnesium is low at 1.1


CT of the abdomen and pelvis: Anasarca with bilateral pleural effusion and 

ascites.  Echo showed ejection fraction of 55-60%.  EKG showed sinus tachycardia

at 122 with no significant ST-T changes.  Q-wave in the inferior leads III and 

aVF.  Chest x-ray showed bilateral pleural effusion more on the left side





11/10/2019


Patient is awake and oriented.  No new complaints.  No chest pain or dyspnea.  

She feels better and better presents with easier mobility.  Her abdominal pain 

is improving, occasional nausea but no vomiting.  Vitals are stable, her CBC 

showing mild pancytopenia with WBC of 2.8, hemoglobin 10.1, platelets at 78, 

magnesium still low with 1.3 which is being replaced, sugar is controlled and 

BMP is unremarkable.  Discussed the case with GI team, plan to add 

spironolactone and paracentesis tomorrow.


Patient was instructed to follow up with her PCP and GI within 1 week after 

discharge, she agrees however she does not want to follow up with her PCP Dr. Malave and she wants to be switched to another PCP, several doctors were 

suggested








11/11/2019


Patient is improving and she feels better with no more abdominal pain, her 

movement is better.  She has better appetite.  She is happy with her progress.  

Patient planned for thoracocentesis.  Discussed with the patient the discharge 

plan and the need for close outpatient follow-up and she agrees.  She wants to 

find new PCP





Objective





- Vital Signs


Vital signs: 


                                   Vital Signs











Temp  98.3 F   11/11/19 03:18


 


Pulse  99   11/11/19 03:18


 


Resp  17   11/11/19 03:18


 


BP  130/72   11/11/19 03:18


 


Pulse Ox  94 L  11/11/19 03:18








                                 Intake & Output











 11/10/19 11/11/19 11/11/19





 18:59 06:59 18:59


 


Intake Total 730  


 


Output Total  1100 


 


Balance 730 -1100 


 


Weight 93.9 kg 94.2 kg 


 


Intake:   


 


  Oral 730  


 


Output:   


 


  Urine  1100 


 


Other:   


 


  Voiding Method Toilet Toilet 


 


  # Voids 4  


 


  # Bowel Movements 1  














- Exam








GENERAL: The patient is alert and oriented x3, not in any acute distress. obese


HEENT: Pupils are round and equally reacting to light. EOMI. No scleral icterus.

No conjunctival pallor. Normocephalic, atraumatic. No pharyngeal erythema. No 

thyromegaly. 


CARDIOVASCULAR: S1 and S2 present. No murmurs, rubs, or gallops. 


PULMONARY: Chest is clear to auscultation, no wheezing or crackles. 


-ABDOMEN: Soft, mild RUQ tenderness,  nondistended, normoactive bowel sounds. No

palpable organomegaly. 


MUSCULOSKELETAL: No joint swelling or deformity. 


-EXTREMITIES: No cyanosis, clubbing.  Bilateral leg edema


NEUROLOGICAL: Gross neurological examination did not reveal any focal deficits. 


SKIN: No rashes. No petechiae





























- Labs


CBC & Chem 7: 


                                 11/11/19 05:52





                                 11/11/19 05:52


Labs: 


                  Abnormal Lab Results - Last 24 Hours (Table)











  11/10/19 11/10/19 11/10/19 Range/Units





  11:39 16:26 20:32 


 


WBC     (3.8-10.6)  k/uL


 


Hgb     (11.4-16.0)  gm/dL


 


Hct     (34.0-46.0)  %


 


MCV     (80.0-100.0)  fL


 


MCH     (25.0-35.0)  pg


 


RDW     (11.5-15.5)  %


 


Plt Count     (150-450)  k/uL


 


Lymphocytes #     (1.0-4.8)  k/uL


 


Carbon Dioxide     (22-30)  mmol/L


 


Glucose     (74-99)  mg/dL


 


POC Glucose (mg/dL)  178 H  171 H  154 H  (75-99)  mg/dL














  11/11/19 11/11/19 11/11/19 Range/Units





  05:52 05:52 06:24 


 


WBC   3.4 L   (3.8-10.6)  k/uL


 


Hgb   10.5 L   (11.4-16.0)  gm/dL


 


Hct   33.3 L   (34.0-46.0)  %


 


MCV   78.8 L   (80.0-100.0)  fL


 


MCH   24.9 L   (25.0-35.0)  pg


 


RDW   15.6 H   (11.5-15.5)  %


 


Plt Count   97 L   (150-450)  k/uL


 


Lymphocytes #   0.6 L   (1.0-4.8)  k/uL


 


Carbon Dioxide  37 H    (22-30)  mmol/L


 


Glucose  145 H    (74-99)  mg/dL


 


POC Glucose (mg/dL)    148 H  (75-99)  mg/dL














Assessment and Plan


Assessment: 





Liver cirrhosis


Diffuse anasarca With bilateral pleural effusion, more on the left,moderate 

ascites.  Secondary to above


Hypomagnesemia


Borderline Enlarged spleen 


diabetes mellitus


Hypertension


History of breast cancer status post lumpectomy 








Plan: 





This is a pleasant 78 years old female who presents with anasarca, with pleural 

effusion and ascites mostly secondary to liver cirrhosis with normal ejection 

fraction and creatinine.  Continue with diuretics.  At spironolactone.  Monitor 

electrolytes.  Follow-up recommendation by GI team 


Labs and medication were reviewed..  Continue same treatment.  Continue with 

symptomatic treatment.  Resume home medication.  Monitor lytes and vitals.  DVT 

and GI prophylaxis.  Further recommendations of the clinical course of the 

patient


DVT prophylaxis: Subcutaneous heparin


GI Prophylaxis: Pepcid


PT/OT: Pending


Prognosis is guarded

## 2019-11-11 NOTE — US
EXAMINATION TYPE: US paracentesis abd w/image

 

DATE OF EXAM: 11/11/2019

 

COMPARISON: NONE

 

HISTORY: Ascites.

 

PROCEDURE:

Maximal barrier technique was utilized.  The skin overlying a suitable pocket of fluid was localized 
with ultrasound and the overlying skin was prepped and draped.  Ultrasound was utilized with sterile 
technique. Lidocaine was used for local anesthesia and a skin nick made with a scalpel. Catheter was 
advanced under direct ultrasound guidance into a suitable pocket of fluid and approximately 4.2 liter
s of serous fluid were removed.  Catheter was withdrawn and hemostasis achieved.  There is no immedia
te complication; the patient is discharged in stable condition. 

 

IMPRESSION:  STATUS POST ULTRASOUND GUIDED PARACENTESIS FOR PALLIATION OF ASCITES.  THIS PROCEDURE WA
S PERFORMED BY THE UNDERSIGNED.   Specimen obtained for laboratory analysis.

## 2019-11-12 VITALS — HEART RATE: 83 BPM | TEMPERATURE: 98.2 F | SYSTOLIC BLOOD PRESSURE: 117 MMHG | DIASTOLIC BLOOD PRESSURE: 60 MMHG

## 2019-11-12 LAB
ALBUMIN SERPL ELPH-MCNC: 2.59 G/DL (ref 3.8–4.9)
ANION GAP SERPL CALC-SCNC: 5 MMOL/L
BASOPHILS # BLD AUTO: 0 K/UL (ref 0–0.2)
BASOPHILS NFR BLD AUTO: 0 %
BUN SERPL-SCNC: 13 MG/DL (ref 7–17)
CALCIUM SPEC-MCNC: 8.3 MG/DL (ref 8.4–10.2)
CHLORIDE SERPL-SCNC: 98 MMOL/L (ref 98–107)
CO2 SERPL-SCNC: 37 MMOL/L (ref 22–30)
EOSINOPHIL # BLD AUTO: 0.1 K/UL (ref 0–0.7)
EOSINOPHIL NFR BLD AUTO: 2 %
ERYTHROCYTE [DISTWIDTH] IN BLOOD BY AUTOMATED COUNT: 4.03 M/UL (ref 3.8–5.4)
ERYTHROCYTE [DISTWIDTH] IN BLOOD: 15.2 % (ref 11.5–15.5)
GAMMA GLOB SERPL ELPH-MCNC: 0.6 G/DL (ref 0.7–1.5)
GLUCOSE BLD-MCNC: 146 MG/DL (ref 75–99)
GLUCOSE BLD-MCNC: 152 MG/DL (ref 75–99)
GLUCOSE SERPL-MCNC: 138 MG/DL (ref 74–99)
HCT VFR BLD AUTO: 31.9 % (ref 34–46)
HGB BLD-MCNC: 10.2 GM/DL (ref 11.4–16)
LKM AB SER-ACNC: 1 UNITS (ref ?–20)
LYMPHOCYTES # SPEC AUTO: 0.6 K/UL (ref 1–4.8)
LYMPHOCYTES NFR SPEC AUTO: 22 %
MCH RBC QN AUTO: 25.4 PG (ref 25–35)
MCHC RBC AUTO-ENTMCNC: 32 G/DL (ref 31–37)
MCV RBC AUTO: 79.3 FL (ref 80–100)
MONOCYTES # BLD AUTO: 0.2 K/UL (ref 0–1)
MONOCYTES NFR BLD AUTO: 9 %
NEUTROPHILS # BLD AUTO: 1.7 K/UL (ref 1.3–7.7)
NEUTROPHILS NFR BLD AUTO: 63 %
PLATELET # BLD AUTO: 77 K/UL (ref 150–450)
POTASSIUM SERPL-SCNC: 3.4 MMOL/L (ref 3.5–5.1)
SODIUM SERPL-SCNC: 140 MMOL/L (ref 137–145)
WBC # BLD AUTO: 2.7 K/UL (ref 3.8–10.6)

## 2019-11-12 RX ADMIN — FAMOTIDINE SCH MG: 10 INJECTION, SOLUTION INTRAVENOUS at 09:25

## 2019-11-12 RX ADMIN — HEPARIN SODIUM SCH UNIT: 5000 INJECTION, SOLUTION INTRAVENOUS; SUBCUTANEOUS at 09:25

## 2019-11-12 RX ADMIN — LISINOPRIL SCH MG: 10 TABLET ORAL at 09:24

## 2019-11-12 RX ADMIN — PIOGLITAZONE SCH MG: 30 TABLET ORAL at 09:25

## 2019-11-12 RX ADMIN — SERTRALINE HYDROCHLORIDE SCH MG: 50 TABLET, FILM COATED ORAL at 09:24

## 2019-11-12 RX ADMIN — LINAGLIPTIN SCH MG: 5 TABLET, FILM COATED ORAL at 09:24

## 2019-11-12 RX ADMIN — SPIRONOLACTONE SCH MG: 25 TABLET, FILM COATED ORAL at 09:24

## 2019-11-12 RX ADMIN — FUROSEMIDE SCH MG: 10 INJECTION, SOLUTION INTRAMUSCULAR; INTRAVENOUS at 09:25

## 2019-11-12 RX ADMIN — Medication SCH UNIT: at 09:24

## 2019-11-12 NOTE — P.CONS
History of Present Illness





- Reason for Consult


Consult date: 11/12/19


pancytopenia


Requesting physician: Sly E Sheet





- Chief Complaint


abd pain





- History of Present Illness





Mrs. Samaniego is a very pleasant  female patient who had seen Dr. Gannon 

back in October 2016 due to pancytopenia.  Patient had no history of blood 

problems, she had a history of an early stage left breast cancer treated with 

lumpectomy and sentinel node biopsy in 1999 followed by radiation and tamoxifen,

switched to Femara, discontinued in 2017 by her Surgeon.  Patient had full 

workup for her pancytopenia including Marc 2 status, epo levels, with no identif

ied pathology, with observation recommended.  She was seen again in 2018 as she 

did not follow-up, ultrasound of the liver and spleen was requested, she did not

get it done when previously ordered, patient was referred to gastroenterology, 

she did not do that.  In July 2018 ultrasound showed splenomegaly and probable 

varices suggestive of portal hypertension.  Patient had remained stable overall,

hemoglobin in the high 10/low 11 range, platelets ranging in the 70-90,000 

range, white blood cells low normal with normal absolute neutrophil counts.  She

had not followed up since, patient was checked on an stated that she was going 

to be seeing a "Dr. Catalan".


Patient states she came to the hospital because of increased abdominal 

distention leading to early CT, she's had some weight loss over the last year, 

she denied any fevers, night sweats, lymph node swellings, difficulty 

swallowing, new or unusual cough, chest pain, shortness of breath, abdominal 

cramping, changes in bowel or bladder habits, swelling in the legs, bleeding, 

unusual bruising for new or unusual pain.  On admission she was found to have 

ascites, she had this fluid removed with resolution of her abdominal complaints,

her white blood cell count was 2.7, hemoglobin 10.2, platelet count 77,000, B12 

was in the low 200 range and this is been supplemented, she was noted also to 

have iron deficiency.  Patient states feeling very well after paracentesis, she 

ate almost all of her food, she is ready to go home.





Review of Systems





14 point review of systems is negative except as stated in HPI





Past Medical History


Past Medical History: Cancer, Diabetes Mellitus, Hypertension


Additional Past Medical History / Comment(s): breast ca,


History of Any Multi-Drug Resistant Organisms: None Reported


Past Surgical History: Breast Surgery, Cholecystectomy


Additional Past Surgical History / Comment(s): Right breast lump removal


Past Anesthesia/Blood Transfusion Reactions: No Reported Reaction


Past Psychological History: Depression


Smoking Status: Never smoker


Past Alcohol Use History: None Reported


Past Drug Use History: None Reported





- Past Family History


  ** Father


Family Medical History: Cancer


Additional Family Medical History / Comment(s): Colon cancer





  ** Brother(s)


Family Medical History: Myocardial Infarction (MI)


Additional Family Medical History / Comment(s): stents, another brother passed 

away from an MI





Medications and Allergies


                                Home Medications











 Medication  Instructions  Recorded  Confirmed  Type


 


Cholecalciferol (Vitamin D3) 2,000 unit PO DAILY 11/09/19 11/09/19 History





[Vitamin D3]    


 


Lisinopril [Zestril] 10 mg PO DAILY 11/09/19 11/09/19 History


 


Pioglitazone [Actos] 30 mg PO DAILY 11/09/19 11/09/19 History


 


Sertraline [Zoloft] 50 mg PO DAILY 11/09/19 11/09/19 History


 


metFORMIN HCL 1,000 mg PO BID 11/09/19 11/09/19 History


 


sitaGLIPtin PHOSPHATE [Januvia] 100 mg PO DAILY 11/09/19 11/09/19 History


 


Cyanocobalamin [Vitamin B-12] 1,000 mcg PO DAILY #60 tab 11/12/19  Rx


 


Famotidine [Pepcid] 20 mg PO DAILY #10 tablet 11/12/19  Rx


 


Furosemide [Lasix] 40 mg PO DAILY #30 tablet 11/12/19  Rx


 


Magnesium Oxide [Mag-Ox] 400 mg PO DAILY 7 Days #7 tablet 11/12/19  Rx


 


Spironolactone [Aldactone] 50 mg PO DAILY #60 tab 11/12/19  Rx








                                    Allergies











Allergy/AdvReac Type Severity Reaction Status Date / Time


 


No Known Allergies Allergy   Verified 11/09/19 12:51














Physical Exam


Vitals: 


                                   Vital Signs











  Temp Pulse Resp BP BP Pulse Ox


 


 11/12/19 11:18  98.2 F  83  18   117/60  97


 


 11/12/19 09:32  97.8 F  93  16   125/53  97


 


 11/12/19 04:00  98.7 F  95  15  116/67   94 L


 


 11/12/19 00:00   83  17  106/50   94 L


 


 11/11/19 20:00  98.5 F  95  18  117/57   96


 


 11/11/19 15:22   105 H  16  153/86   94 L


 


 11/11/19 14:37   96  16   137/66  94 L


 


 11/11/19 14:19   97  16   147/76  95








                                Intake and Output











 11/11/19 11/12/19 11/12/19





 22:59 06:59 14:59


 


Intake Total 280 20 462


 


Output Total 200 900 550


 


Balance 80 -880 -88


 


Intake:   


 


  IV 30 20 


 


    Invasive Line 1 30 20 


 


  Oral 250  462


 


Output:   


 


  Urine 200 900 550


 


Other:   


 


  Voiding Method Toilet Toilet Toilet


 


  # Voids 1 1 1


 


  Weight  88.5 kg 














- Constitutional


General appearance: cooperative, no acute distress, obese





- EENT


Eyes: anicteric sclerae, edentulous, poor dentition


ENT: hearing grossly normal, normal oropharynx





- Neck


Neck: no lymphadenopathy





- Respiratory


Respiratory: bilateral: CTA





- Cardiovascular


Rhythm: regular


Heart sounds: normal: S1, S2


Abnormal Heart Sounds: no systolic murmur, no diastolic murmur, no rub, no S3 

Gallop, no S4 Gallop, no click, no other


  ** leg


Peripheral Edema: bilateral: None





- Gastrointestinal


General gastrointestinal: no absent bowel sounds, no decreased bowel sounds, no 

distended, no hepatomegaly, no hyperactive bowel sounds, normal bowel sounds, no

 organomegaly, no rigid, scaphoid, no soft, no splenomegaly, no tenderness, no 

umbilical hernia, no ventral hernia





- Neurologic


Neurologic: CNII-XII intact





- Musculoskeletal


Musculoskeletal: strength equal bilaterally





- Psychiatric


Psychiatric: A&O x's 3, appropriate affect, intact judgment & insight





Results


CBC & Chem 7: 


                                 11/12/19 05:57





                                 11/12/19 05:57


Labs: 


                  Abnormal Lab Results - Last 24 Hours (Table)











  11/11/19 11/11/19 11/11/19 Range/Units





  05:52 16:45 20:37 


 


WBC     (3.8-10.6)  k/uL


 


Hgb     (11.4-16.0)  gm/dL


 


Hct     (34.0-46.0)  %


 


MCV     (80.0-100.0)  fL


 


Plt Count     (150-450)  k/uL


 


Lymphocytes #     (1.0-4.8)  k/uL


 


Potassium     (3.5-5.1)  mmol/L


 


Carbon Dioxide     (22-30)  mmol/L


 


Glucose     (74-99)  mg/dL


 


POC Glucose (mg/dL)   188 H  166 H  (75-99)  mg/dL


 


Calcium     (8.4-10.2)  mg/dL


 


Total Protein (PEP)  4.9 L    (6.2-8.2)  g/dL


 


Albumin (PEP)  2.59 L    (3.80-4.90)  g/dL


 


Gamma Globulins  0.60 L    (0.70-1.50)  g/dL














  11/12/19 11/12/19 11/12/19 Range/Units





  05:57 05:57 06:09 


 


WBC   2.7 L   (3.8-10.6)  k/uL


 


Hgb   10.2 L   (11.4-16.0)  gm/dL


 


Hct   31.9 L   (34.0-46.0)  %


 


MCV   79.3 L   (80.0-100.0)  fL


 


Plt Count   77 L   (150-450)  k/uL


 


Lymphocytes #   0.6 L   (1.0-4.8)  k/uL


 


Potassium  3.4 L    (3.5-5.1)  mmol/L


 


Carbon Dioxide  37 H    (22-30)  mmol/L


 


Glucose  138 H    (74-99)  mg/dL


 


POC Glucose (mg/dL)    146 H  (75-99)  mg/dL


 


Calcium  8.3 L    (8.4-10.2)  mg/dL


 


Total Protein (PEP)     (6.2-8.2)  g/dL


 


Albumin (PEP)     (3.80-4.90)  g/dL


 


Gamma Globulins     (0.70-1.50)  g/dL














  11/12/19 Range/Units





  11:52 


 


WBC   (3.8-10.6)  k/uL


 


Hgb   (11.4-16.0)  gm/dL


 


Hct   (34.0-46.0)  %


 


MCV   (80.0-100.0)  fL


 


Plt Count   (150-450)  k/uL


 


Lymphocytes #   (1.0-4.8)  k/uL


 


Potassium   (3.5-5.1)  mmol/L


 


Carbon Dioxide   (22-30)  mmol/L


 


Glucose   (74-99)  mg/dL


 


POC Glucose (mg/dL)  152 H  (75-99)  mg/dL


 


Calcium   (8.4-10.2)  mg/dL


 


Total Protein (PEP)   (6.2-8.2)  g/dL


 


Albumin (PEP)   (3.80-4.90)  g/dL


 


Gamma Globulins   (0.70-1.50)  g/dL








                      Microbiology - Last 24 Hours (Table)











 11/11/19 14:28 Gram Stain - Preliminary





 Peritoneal Fluid Body Fluid Culture - Preliminary


 


 11/11/19 14:00 Anaerobic Culture - Preliminary





 Ascites Fluid 











US - abdomen: report reviewed





Assessment and Plan


(1) Pancytopenia


Narrative/Plan: 


Previous workup and current workup is not suggestive of a paraproteinemia.  She 

does have B12 and iron deficiency to be corrected.  Patient also has non-alcoh

olic cirrhosis of the liver that is likely causing splenic sequestration and 

some of her low counts as well.  We will remain available for patient if further

 workup is needed after correction of other underlying conditions.  She 

verbalized understanding


Status: Acute   Priority: High   Code(s): D61.818 - OTHER PANCYTOPENIA   SNOMED 

Code(s): 457655301


   





(2) Ascites


Narrative/Plan: 


Status post paracentesis of greater than 4 L of fluid with significant relief to

 the patient.  Cytology is pending. 


AFP level evaluated that is within normal limits


Status: Acute   Code(s): R18.8 - OTHER ASCITES   SNOMED Code(s): 199532730


   





(3) Liver cirrhosis


Narrative/Plan: 


Patient encouraged to continue appropriate follow-up with gastroenterology


Status: Chronic   Priority: High   Code(s): K74.60 - UNSPECIFIED CIRRHOSIS OF 

LIVER   SNOMED Code(s): 04255872


   





(4) Iron deficiency anemia


Narrative/Plan: 


Did recommend the patient an OTC iron supplementation


Status: Acute   Priority: Medium   Code(s): D50.9 - IRON DEFICIENCY ANEMIA, U

NSPECIFIED   SNOMED Code(s): 36336269

## 2019-11-12 NOTE — P.PN
Subjective


Progress Note Date: 11/11/19


Principal diagnosis: 





Decompensated cirrhosis





Patient seen sitting bedside with .  Status post paracentesis she is 

feeling less abdominal distention.





Objective





- Vital Signs


Vital signs: 


                                   Vital Signs











Temp  98.5 F   11/11/19 20:00


 


Pulse  95   11/11/19 20:00


 


Resp  18   11/11/19 20:00


 


BP  117/57   11/11/19 20:00


 


Pulse Ox  96   11/11/19 20:00








                                 Intake & Output











 11/11/19 11/11/19 11/12/19





 06:59 18:59 06:59


 


Intake Total  500 30


 


Output Total 1100  200


 


Balance -1100 500 -170


 


Weight 94.2 kg  


 


Intake:   


 


  IV   30


 


    Invasive Line 1   30


 


  Oral  500 


 


Output:   


 


  Urine 1100  200


 


Other:   


 


  Voiding Method Toilet  Toilet


 


  # Voids  4 1


 


  # Bowel Movements  1 














- Exam





On physical examination, patient appears comfortable in no apparent distress. 


HEAD: Normocephalic, atraumatic. 


EYES: No scleral icterus. No conjunctival injection. 


MOUTH: No lesions, tongue midline. 


NECK: Trachea midline, no gross abnormalities. 


CHEST: Decreased air entry bilaterally. 


ABDOMEN: Soft, obese. Bowel sounds are positive. No organomegaly.  No guarding 

or rigidity.


EXTREMITIES: No pedal edema. 


SKIN: No rashes, no jaundice. 


NEUROLOGIC: Alert and oriented x3.  No focal deficits. 





- Labs


CBC & Chem 7: 


                                 11/12/19 05:57





                                 11/12/19 05:57


Labs: 


                  Abnormal Lab Results - Last 24 Hours (Table)











  11/11/19 11/11/19 11/11/19 Range/Units





  05:52 05:52 05:52 


 


WBC    3.4 L  (3.8-10.6)  k/uL


 


Hgb    10.5 L  (11.4-16.0)  gm/dL


 


Hct    33.3 L  (34.0-46.0)  %


 


MCV    78.8 L  (80.0-100.0)  fL


 


MCH    24.9 L  (25.0-35.0)  pg


 


RDW    15.6 H  (11.5-15.5)  %


 


Plt Count    97 L  (150-450)  k/uL


 


Lymphocytes #    0.6 L  (1.0-4.8)  k/uL


 


Carbon Dioxide   37 H   (22-30)  mmol/L


 


Glucose   145 H   (74-99)  mg/dL


 


POC Glucose (mg/dL)     (75-99)  mg/dL


 


Iron   22 L   ()  ug/dL


 


% Saturation   6.08 L   (12.00-45.00)   


 


MARGIE Screen  POSITIVE H    (NEGATIVE)  














  11/11/19 11/11/19 11/11/19 Range/Units





  06:24 11:44 16:45 


 


WBC     (3.8-10.6)  k/uL


 


Hgb     (11.4-16.0)  gm/dL


 


Hct     (34.0-46.0)  %


 


MCV     (80.0-100.0)  fL


 


MCH     (25.0-35.0)  pg


 


RDW     (11.5-15.5)  %


 


Plt Count     (150-450)  k/uL


 


Lymphocytes #     (1.0-4.8)  k/uL


 


Carbon Dioxide     (22-30)  mmol/L


 


Glucose     (74-99)  mg/dL


 


POC Glucose (mg/dL)  148 H  174 H  188 H  (75-99)  mg/dL


 


Iron     ()  ug/dL


 


% Saturation     (12.00-45.00)   


 


MARGIE Screen     (NEGATIVE)  














  11/11/19 Range/Units





  20:37 


 


WBC   (3.8-10.6)  k/uL


 


Hgb   (11.4-16.0)  gm/dL


 


Hct   (34.0-46.0)  %


 


MCV   (80.0-100.0)  fL


 


MCH   (25.0-35.0)  pg


 


RDW   (11.5-15.5)  %


 


Plt Count   (150-450)  k/uL


 


Lymphocytes #   (1.0-4.8)  k/uL


 


Carbon Dioxide   (22-30)  mmol/L


 


Glucose   (74-99)  mg/dL


 


POC Glucose (mg/dL)  166 H  (75-99)  mg/dL


 


Iron   ()  ug/dL


 


% Saturation   (12.00-45.00)   


 


MARGIE Screen   (NEGATIVE)  














Assessment and Plan


(1) Liver cirrhosis


Narrative/Plan: 


78-year-old female presenting due to abdominal pain with no findings of liver 

cirrhosis and ascites on computed tomography scan of the abdomen.  No prior 

history of liver disease.  No history of viral hepatitis or decompensated liver 

disease reported.  The patient reports increasing abdominal distention over the 

past few months of unknown etiology.  She is currently not on any home diuresis 

but has been started on Lasix 40 mg twice daily in the hospital and Aldactone.  

Paracentesis significant for over 4 L of ascitic fluid.  Fluid studies co

nsistent with liver etiology of the ascites.  Suspicion is for nonalcoholic 

steatohepatitis leading to cirrhosis.


Current Visit: Yes   Status: Acute   Code(s): K74.60 - UNSPECIFIED CIRRHOSIS OF 

LIVER   SNOMED Code(s): 92529463


   





(2) Abdominal pain


Current Visit: Yes   Status: Acute   Code(s): R10.9 - UNSPECIFIED ABDOMINAL PAIN

  SNOMED Code(s): 71744447


   





(3) Fluid overload


Current Visit: Yes   Status: Acute   Code(s): E87.70 - FLUID OVERLOAD, 

UNSPECIFIED   SNOMED Code(s): 78520361


   


Plan: 





Supportive care


Sodium restricted diet


Lasix 40 mg twice a day


Aldactone increased to 50 mg daily


Would plan on discharge with Lasix 40 mg daily and Aldactone to 50 mg daily


Paracentesis with fluid studies consistent with liver etiology of the ascites


Will order full liver serology which has been negative to date


Continue to monitor CBC, CMP and INR


Follow-up in gastroenterology clinic after discharge


Thank you for allowing us to participate in the care of this patient we will 

continue to follow

## 2019-11-19 NOTE — P.DS
Providers


Date of admission: 


11/09/19 13:38





Attending physician: 


Abby Rodarte





Consults: 





                                        





11/09/19 15:40


Consult Physician Urgent 


   Consulting Provider: Thania Puga


   Consult Reason/Comments: Cirrhosis with ascites and anasarca


   Do you want consulting provider notified?: Yes





11/12/19 10:33


Consult Physician Urgent 


   Consulting Provider: Mirza Ganonn


   Consult Reason/Comments: pancytopenia


   Do you want consulting provider notified?: Yes











Primary care physician: 


[facility]





Hospital Course: 





Dx:


Liver cirrhosis, new onset


Diffuse anasarca With bilateral pleural effusion, more on the left,moderate 

ascites.  Secondary to above


Mild pancytopenia


B12 deficiency


Hypomagnesemia, improved


Borderline Enlarged spleen 


diabetes mellitus


Hypertension


History of breast cancer status post lumpectomy








Hospital course:


This is a pleasant 78 years old female with past medical history of diabetes 

mellitus, hypertension, breast cancer status post right breast lump removal.  

Presents because of abdominal pain, fluid overload and mild dyspnea, pt states 

she is coming because of worsening abd pain and some dyspnea , no fever .  

Patient was found to have anasarca with ascites and some pleural effusion.  

Abdominal CT on admission showed liver cirrhosis.  Gastroenterologist evaluated 

the patient and patient was started on Lasix and Aldactone, also patient 

underwent paracentesis.  Patient's symptoms are improved significantly with no 

more dyspnea or abdominal pain.  Patient can move more easily and today she was 

smiling stating that " I could take off My socks again".  Several blood tests 

were sent as workup for her liver cirrhosis ordered by gastroenterologist, 

including negative alpha-fetoprotein with 2.5 which is at reference range.  

Lipase is negative.  However other blood tests like a ball mean PEEP, alpha-1 

globulin and L4 to globulins and better globulins, gamma globulins and PEEP 

interpretation are pending.  Ceruloplasmin is negative at 24.4.  Lipase is 

negative.  B12 is low at 276.  Patient was given 2 doses of IM cyanocobalaime 

1000 g, and started on oral vitamin B12.  She has mild pancytopenia with WBC of

2.7, hemoglobin 10.2, platelets at 77 K. hematologist evaluated pt ,but pt did 

not want to f/u with hematology service as outpt but rather she will talk to her

pcp first.  Prescription for vitamin B12 is provided for the patient and 

instructed to recheck her vitamin B12 and complete blood count with her doctor, 

risks , benefits and alterantive are explained for the pt 


Patient was cleared for discharge by gastroenterologist


Problems and management plan were discussed with the patient and he verbalized 

understanding and acceptance. family was at bed side


Patient was found stable and can be discharged home however he needs follow-up 

as an outpatient. Patient was instructed to follow up with PCP within one week 

and patient agrees.  Also patient wants to switch her PCP to a new doctor in 

[place].  She agrees to see Dr. Ibarra as an outpatient.Patient agrees with the

appointments made for her with GI team and she stated she will follow-up as well

as with her PCP Dr. Ibarra, whom I discussed the case with  including the 

above recommendation for recheck in B12 and GI referral and he kindly took note 

of these.





Gen: patient is a AAOx3, no distress


CVS: S1-S2, RRR, no murmur


Lungs: B/L CTA, no wheezing


Abdomen: soft, no distention, no tenderness, positive bowel sounds


Extremity: no leg edema or induration





Time spent more than 35 minutes


Patient Condition at Discharge: Stable





Plan - Discharge Summary


Discharge Rx Participant: No


New Discharge Prescriptions: 


New


   Famotidine [Pepcid] 20 mg PO DAILY #10 tablet


   Cyanocobalamin [Vitamin B-12] 1,000 mcg PO DAILY #60 tab


   Magnesium Oxide [Mag-Ox] 400 mg PO DAILY 7 Days #7 tablet


   Spironolactone [Aldactone] 50 mg PO DAILY #60 tab


   Furosemide [Lasix] 40 mg PO DAILY #30 tablet





Continue


   sitaGLIPtin PHOSPHATE [Januvia] 100 mg PO DAILY


   Sertraline [Zoloft] 50 mg PO DAILY


   Pioglitazone [Actos] 30 mg PO DAILY


   Lisinopril [Zestril] 10 mg PO DAILY


   Cholecalciferol (Vitamin D3) [Vitamin D3] 2,000 unit PO DAILY


   metFORMIN HCL 1,000 mg PO BID


Discharge Medication List





Cholecalciferol (Vitamin D3) [Vitamin D3] 2,000 unit PO DAILY 11/09/19 [History]


Lisinopril [Zestril] 10 mg PO DAILY 11/09/19 [History]


Pioglitazone [Actos] 30 mg PO DAILY 11/09/19 [History]


Sertraline [Zoloft] 50 mg PO DAILY 11/09/19 [History]


metFORMIN HCL 1,000 mg PO BID 11/09/19 [History]


sitaGLIPtin PHOSPHATE [Januvia] 100 mg PO DAILY 11/09/19 [History]


Cyanocobalamin [Vitamin B-12] 1,000 mcg PO DAILY #60 tab 11/12/19 [Rx]


Famotidine [Pepcid] 20 mg PO DAILY #10 tablet 11/12/19 [Rx]


Furosemide [Lasix] 40 mg PO DAILY #30 tablet 11/12/19 [Rx]


Magnesium Oxide [Mag-Ox] 400 mg PO DAILY 7 Days #7 tablet 11/12/19 [Rx]


Spironolactone [Aldactone] 50 mg PO DAILY #60 tab 11/12/19 [Rx]








Follow up Appointment(s)/Referral(s): 


Susan King MD [REFERRING] - 11/19/19 1:00 pm (please recheck your vitamin 

B12 and complete blood count with her doctor)


Nikolas Pollard MD [STAFF PHYSICIAN] - 12/05/19 10:00 am


(Luna Garcia NP


Thursday)


Patient Instructions/Handouts:  Cirrhosis (DC)


Activity/Diet/Wound Care/Special Instructions: 


Requires walker upon discharge for unsteady gait 


Discharge Disposition: HOME SELF-CARE

## 2019-12-19 ENCOUNTER — HOSPITAL ENCOUNTER (OUTPATIENT)
Dept: HOSPITAL 47 - RADPROMAIN | Age: 79
Discharge: HOME | End: 2019-12-19
Attending: FAMILY MEDICINE
Payer: MEDICARE

## 2019-12-19 VITALS — DIASTOLIC BLOOD PRESSURE: 76 MMHG | HEART RATE: 94 BPM | SYSTOLIC BLOOD PRESSURE: 144 MMHG | RESPIRATION RATE: 16 BRPM

## 2019-12-19 VITALS — TEMPERATURE: 98.7 F

## 2019-12-19 DIAGNOSIS — E11.9: ICD-10-CM

## 2019-12-19 DIAGNOSIS — R18.8: Primary | ICD-10-CM

## 2019-12-19 LAB
GLUCOSE BLD-MCNC: 176 MG/DL (ref 75–99)
INR PPP: 1.2 (ref ?–1.2)
PLATELET # BLD AUTO: 105 K/UL (ref 150–450)
PT BLD: 12.1 SEC (ref 9–12)

## 2019-12-19 PROCEDURE — 85049 AUTOMATED PLATELET COUNT: CPT

## 2019-12-19 PROCEDURE — 49083 ABD PARACENTESIS W/IMAGING: CPT

## 2019-12-19 PROCEDURE — 82565 ASSAY OF CREATININE: CPT

## 2019-12-19 PROCEDURE — 36415 COLL VENOUS BLD VENIPUNCTURE: CPT

## 2019-12-19 PROCEDURE — 85610 PROTHROMBIN TIME: CPT

## 2019-12-20 NOTE — US
EXAMINATION TYPE: US paracentesis abd w/image

 

DATE OF EXAM: 12/19/2019

 

COMPARISON: NONE

 

HISTORY: Ascites.

 

PROCEDURE:

Maximal barrier technique was utilized.  The skin overlying a suitable pocket of fluid was localized 
with ultrasound and the overlying skin was prepped and draped.  Ultrasound was utilized with sterile 
technique. Lidocaine was used for local anesthesia and a skin nick made with a scalpel. Catheter was 
advanced under direct ultrasound guidance into a suitable pocket of fluid and approximately 4.1 liter
s of serous fluid were removed.  Catheter was withdrawn and hemostasis achieved.  There is no immedia
te complication; the patient is discharged in stable condition. 

 

IMPRESSION:  STATUS POST ULTRASOUND GUIDED PARACENTESIS FOR PALLIATION OF ASCITES.  THIS PROCEDURE WA
S PERFORMED BY THE UNDERSIGNED.

## 2020-01-14 ENCOUNTER — HOSPITAL ENCOUNTER (EMERGENCY)
Dept: HOSPITAL 47 - EC | Age: 80
Discharge: HOME | End: 2020-01-14
Payer: MEDICARE

## 2020-01-14 VITALS
HEART RATE: 89 BPM | SYSTOLIC BLOOD PRESSURE: 123 MMHG | TEMPERATURE: 98.7 F | DIASTOLIC BLOOD PRESSURE: 56 MMHG | RESPIRATION RATE: 18 BRPM

## 2020-01-14 DIAGNOSIS — R18.8: Primary | ICD-10-CM

## 2020-01-14 DIAGNOSIS — Z80.0: ICD-10-CM

## 2020-01-14 DIAGNOSIS — R60.0: ICD-10-CM

## 2020-01-14 DIAGNOSIS — K74.60: ICD-10-CM

## 2020-01-14 DIAGNOSIS — Z79.84: ICD-10-CM

## 2020-01-14 DIAGNOSIS — Z79.899: ICD-10-CM

## 2020-01-14 DIAGNOSIS — Z90.49: ICD-10-CM

## 2020-01-14 DIAGNOSIS — E11.9: ICD-10-CM

## 2020-01-14 DIAGNOSIS — Z98.890: ICD-10-CM

## 2020-01-14 DIAGNOSIS — Z85.3: ICD-10-CM

## 2020-01-14 DIAGNOSIS — R14.0: ICD-10-CM

## 2020-01-14 DIAGNOSIS — I10: ICD-10-CM

## 2020-01-14 DIAGNOSIS — F32.9: ICD-10-CM

## 2020-01-14 LAB
ALBUMIN SERPL-MCNC: 3.1 G/DL (ref 3.5–5)
ALP SERPL-CCNC: 55 U/L (ref 38–126)
ALT SERPL-CCNC: 14 U/L (ref 4–34)
AMYLASE SERPL-CCNC: <30 U/L (ref 30–110)
ANION GAP SERPL CALC-SCNC: 7 MMOL/L
APTT BLD: 24.4 SEC (ref 22–30)
AST SERPL-CCNC: 33 U/L (ref 14–36)
BASOPHILS # BLD AUTO: 0 K/UL (ref 0–0.2)
BASOPHILS NFR BLD AUTO: 0 %
BUN SERPL-SCNC: 16 MG/DL (ref 7–17)
CALCIUM SPEC-MCNC: 9.3 MG/DL (ref 8.4–10.2)
CHLORIDE SERPL-SCNC: 96 MMOL/L (ref 98–107)
CO2 SERPL-SCNC: 34 MMOL/L (ref 22–30)
EOSINOPHIL # BLD AUTO: 0.1 K/UL (ref 0–0.7)
EOSINOPHIL NFR BLD AUTO: 1 %
ERYTHROCYTE [DISTWIDTH] IN BLOOD BY AUTOMATED COUNT: 4.03 M/UL (ref 3.8–5.4)
ERYTHROCYTE [DISTWIDTH] IN BLOOD: 16.5 % (ref 11.5–15.5)
GLUCOSE SERPL-MCNC: 178 MG/DL (ref 74–99)
HCT VFR BLD AUTO: 32 % (ref 34–46)
HEPATITIS A ANTIBODY IGM: NEGATIVE
HGB BLD-MCNC: 10.4 GM/DL (ref 11.4–16)
HYALINE CASTS UR QL AUTO: 1 /LPF (ref 0–2)
INR PPP: 1.2 (ref ?–1.2)
LYMPHOCYTES # SPEC AUTO: 0.5 K/UL (ref 1–4.8)
LYMPHOCYTES NFR SPEC AUTO: 15 %
MCH RBC QN AUTO: 25.8 PG (ref 25–35)
MCHC RBC AUTO-ENTMCNC: 32.5 G/DL (ref 31–37)
MCV RBC AUTO: 79.3 FL (ref 80–100)
MONOCYTES # BLD AUTO: 0.3 K/UL (ref 0–1)
MONOCYTES NFR BLD AUTO: 9 %
NEUTROPHILS # BLD AUTO: 2.5 K/UL (ref 1.3–7.7)
NEUTROPHILS NFR BLD AUTO: 72 %
PH UR: 8 [PH] (ref 5–8)
PLATELET # BLD AUTO: 97 K/UL (ref 150–450)
POTASSIUM SERPL-SCNC: 3.8 MMOL/L (ref 3.5–5.1)
PROT SERPL-MCNC: 5.8 G/DL (ref 6.3–8.2)
PT BLD: 12.4 SEC (ref 9–12)
SODIUM SERPL-SCNC: 137 MMOL/L (ref 137–145)
SP GR UR: 1.01 (ref 1–1.03)
SQUAMOUS UR QL AUTO: 6 /HPF (ref 0–4)
UROBILINOGEN UR QL STRIP: 2 MG/DL (ref ?–2)
WBC # BLD AUTO: 3.4 K/UL (ref 3.8–10.6)
WBC # UR AUTO: 3 /HPF (ref 0–5)

## 2020-01-14 PROCEDURE — 80074 ACUTE HEPATITIS PANEL: CPT

## 2020-01-14 PROCEDURE — 74018 RADEX ABDOMEN 1 VIEW: CPT

## 2020-01-14 PROCEDURE — 82150 ASSAY OF AMYLASE: CPT

## 2020-01-14 PROCEDURE — 85730 THROMBOPLASTIN TIME PARTIAL: CPT

## 2020-01-14 PROCEDURE — 85610 PROTHROMBIN TIME: CPT

## 2020-01-14 PROCEDURE — 99284 EMERGENCY DEPT VISIT MOD MDM: CPT

## 2020-01-14 PROCEDURE — 83690 ASSAY OF LIPASE: CPT

## 2020-01-14 PROCEDURE — 96375 TX/PRO/DX INJ NEW DRUG ADDON: CPT

## 2020-01-14 PROCEDURE — 49083 ABD PARACENTESIS W/IMAGING: CPT

## 2020-01-14 PROCEDURE — 80053 COMPREHEN METABOLIC PANEL: CPT

## 2020-01-14 PROCEDURE — 81001 URINALYSIS AUTO W/SCOPE: CPT

## 2020-01-14 PROCEDURE — 96365 THER/PROPH/DIAG IV INF INIT: CPT

## 2020-01-14 PROCEDURE — 36415 COLL VENOUS BLD VENIPUNCTURE: CPT

## 2020-01-14 PROCEDURE — 85025 COMPLETE CBC W/AUTO DIFF WBC: CPT

## 2020-01-14 RX ADMIN — ALBUMIN HUMAN SCH MLS/HR: 0.25 SOLUTION INTRAVENOUS at 16:49

## 2020-01-14 RX ADMIN — ALBUMIN HUMAN SCH MLS/HR: 0.25 SOLUTION INTRAVENOUS at 16:03

## 2020-01-14 NOTE — US
EXAMINATION TYPE: US paracentesis abd w/image

 

DATE OF EXAM: 1/14/2020

 

COMPARISON: NONE

 

HISTORY: Ascites.

 

PROCEDURE:

Maximal barrier technique was utilized.  The skin overlying a suitable pocket of fluid was localized 
with ultrasound and the overlying skin was prepped and draped.  Ultrasound was utilized with sterile 
technique. Lidocaine was used for local anesthesia and a skin nick made with a scalpel. Catheter was 
advanced under direct ultrasound guidance into a suitable pocket of fluid and approximately 4.6 liter
s of serous fluid were removed.  Catheter was withdrawn and hemostasis achieved.  There is no immedia
te complication; the patient is discharged in stable condition. 

 

IMPRESSION:  STATUS POST ULTRASOUND GUIDED PARACENTESIS FOR PALLIATION OF ASCITES.  THIS PROCEDURE WA
S PERFORMED BY THE UNDERSIGNED.

## 2020-01-14 NOTE — XR
KUB

 

HISTORY: Abdominal pain

 

Frontal KUB submitted on 2 images. Comparison to prior CT 11/9/2019

 

Left pleural effusion is again noted. Surgical clips are present in the right upper quadrant. Air-flu
id levels are present without bowel distention. No evident pneumoperitoneum. No pathologic calcificat
ion. Diffuse increased attenuation within the abdomen may be indicative of underlying ascites. Calcif
ications in the pelvis may be vascular.

 

impression: Left pleural effusion. Possible underlying ascites. Postop changes. Nonobstructive bowel 
gas pattern, follow-up as indicated.

## 2020-01-14 NOTE — ED
General Adult HPI





- General


Chief complaint: Abdominal Pain


Stated complaint: Abdominal distention


Time Seen by Provider: 01/14/20 09:39


Source: patient, RN notes reviewed


Mode of arrival: wheelchair


Limitations: no limitations





- History of Present Illness


Initial comments: 





Patient is a pleasant 79-year-old female presenting to the emergency department 

with concerns for abdominal distention.  Patient has known cirrhosis.  Unclear 

why she developed cirrhosis.  Patient is a nondrinker.  Patient has had similar 

symptoms twice previously of ascites.  Patient has been drinking twice.  

Symptoms started a couple of weeks ago.  Patient has gained 10 pounds in the 

past week.  Patient feels like her abdomen is full and distended.  Patient does 

have some abdominal discomfort.  Patient is tolerating oral intake.  No 

constipation or diarrhea.  No fevers.





- Related Data


                                Home Medications











 Medication  Instructions  Recorded  Confirmed


 


Cholecalciferol (Vitamin D3) 2,000 unit PO DAILY 11/09/19 01/14/20





[Vitamin D3]   


 


Lisinopril [Zestril] 10 mg PO DAILY 11/09/19 01/14/20


 


Pioglitazone [Actos] 30 mg PO DAILY 11/09/19 01/14/20


 


Sertraline [Zoloft] 50 mg PO DAILY 11/09/19 01/14/20


 


metFORMIN HCL 1,000 mg PO BID 11/09/19 01/14/20


 


sitaGLIPtin PHOSPHATE [Januvia] 100 mg PO DAILY 11/09/19 01/14/20


 


Spironolactone [Aldactone] 25 mg PO DAILY 01/14/20 01/14/20








                                  Previous Rx's











 Medication  Instructions  Recorded


 


Cyanocobalamin [Vitamin B-12] 1,000 mcg PO DAILY #60 tab 11/12/19


 


Furosemide [Lasix] 40 mg PO DAILY #30 tablet 11/12/19











                                    Allergies











Allergy/AdvReac Type Severity Reaction Status Date / Time


 


No Known Allergies Allergy   Verified 01/14/20 09:59














Review of Systems


ROS Statement: 


Those systems with pertinent positive or pertinent negative responses have been 

documented in the HPI.





ROS Other: All systems not noted in ROS Statement are negative.


Constitutional: Denies: fever


Eyes: Denies: eye pain


ENT: Denies: ear pain


Respiratory: Denies: cough, dyspnea


Cardiovascular: Denies: chest pain


Endocrine: Denies: fatigue


Gastrointestinal: Reports: as per HPI


Genitourinary: Denies: dysuria


Musculoskeletal: Denies: back pain


Skin: Denies: rash


Neurological: Denies: weakness





Past Medical History


Past Medical History: Cancer, Diabetes Mellitus, Hypertension, Liver Disease, 

Skin Disorder


Additional Past Medical History / Comment(s): breast ca left  ,psorias, ascites


History of Any Multi-Drug Resistant Organisms: None Reported


Past Surgical History: Breast Surgery, Cholecystectomy


Additional Past Surgical History / Comment(s): Right breast lump removal, para

centesis


Past Anesthesia/Blood Transfusion Reactions: No Reported Reaction


Past Psychological History: Depression


Smoking Status: Never smoker


Past Alcohol Use History: None Reported


Past Drug Use History: None Reported





- Past Family History


  ** Father


Family Medical History: Cancer


Additional Family Medical History / Comment(s): Colon cancer





  ** Brother(s)


Family Medical History: Myocardial Infarction (MI)


Additional Family Medical History / Comment(s): stents, another brother passed 

away from an MI





General Exam


Limitations: no limitations


General appearance: alert, in no apparent distress


Head exam: Present: normocephalic


Eye exam: Present: normal appearance, PERRL


ENT exam: Present: normal oropharynx


Neck exam: Present: normal inspection


Respiratory exam: Present: normal lung sounds bilaterally


Cardiovascular Exam: Present: regular rate, normal rhythm


GI/Abdominal exam: Present: soft, distended (Moderate distention), tenderness 

(Minimal diffuse discomfort), normal bowel sounds.  Absent: guarding, rebound, 

rigid


Extremities exam: Present: pedal edema (+1 bilateral).  Absent: calf tenderness


Neurological exam: Present: alert


Psychiatric exam: Present: normal affect, normal mood


Skin exam: Present: normal color





Course


                                   Vital Signs











  01/14/20 01/14/20 01/14/20





  09:23 12:00 12:10


 


Temperature 97.9 F  


 


Pulse Rate 103 H 96 92


 


Pulse Rate [   





Pulse Oximetery   





]   


 


Respiratory 18  





Rate   


 


Blood Pressure 116/54 125/54 123/58


 


Blood Pressure   





[Right Arm]   


 


O2 Sat by Pulse 97 97 98





Oximetry   














  01/14/20 01/14/20 01/14/20





  12:30 13:00 13:45


 


Temperature   98.2 F


 


Pulse Rate   


 


Pulse Rate [   94





Pulse Oximetery   





]   


 


Respiratory   14





Rate   


 


Blood Pressure 123/58 116/61 


 


Blood Pressure   144/71





[Right Arm]   


 


O2 Sat by Pulse 98 94 L 98





Oximetry   














  01/14/20 01/14/20 01/14/20





  14:08 14:20 14:32


 


Temperature   


 


Pulse Rate   


 


Pulse Rate [ 95 95 93





Pulse Oximetery   





]   


 


Respiratory 14 14 15





Rate   


 


Blood Pressure   


 


Blood Pressure 133/65 131/71 134/67





[Right Arm]   


 


O2 Sat by Pulse 100 100 98





Oximetry   














  01/14/20 01/14/20 01/14/20





  14:40 14:50 15:00


 


Temperature   


 


Pulse Rate   


 


Pulse Rate [ 92 91 92





Pulse Oximetery   





]   


 


Respiratory 16 14 14





Rate   


 


Blood Pressure   


 


Blood Pressure 128/69 136/62 135/68





[Right Arm]   


 


O2 Sat by Pulse 98 98 99





Oximetry   














  01/14/20 01/14/20 01/14/20





  15:10 15:17 15:30


 


Temperature   


 


Pulse Rate   


 


Pulse Rate [ 91 92 94





Pulse Oximetery   





]   


 


Respiratory 16 16 16





Rate   


 


Blood Pressure   


 


Blood Pressure 123/64 125/65 115/63





[Right Arm]   


 


O2 Sat by Pulse 99 99 99





Oximetry   














  01/14/20





  16:00


 


Temperature 


 


Pulse Rate 


 


Pulse Rate [ 





Pulse Oximetery 





] 


 


Respiratory 





Rate 


 


Blood Pressure 131/56


 


Blood Pressure 





[Right Arm] 


 


O2 Sat by Pulse 98





Oximetry 














Medical Decision Making





- Medical Decision Making





Patient reevaluated and symptom-free following paracentesis.  Patient requesting

discharge.  Patient is being given albumin hysterectomy by interventional 

radiology.





- Lab Data


Result diagrams: 


                                 01/14/20 10:20





                                 01/14/20 10:20


                                   Lab Results











  01/14/20 01/14/20 01/14/20 Range/Units





  10:20 10:20 10:20 


 


WBC   3.4 L   (3.8-10.6)  k/uL


 


RBC   4.03   (3.80-5.40)  m/uL


 


Hgb   10.4 L   (11.4-16.0)  gm/dL


 


Hct   32.0 L   (34.0-46.0)  %


 


MCV   79.3 L   (80.0-100.0)  fL


 


MCH   25.8   (25.0-35.0)  pg


 


MCHC   32.5   (31.0-37.0)  g/dL


 


RDW   16.5 H   (11.5-15.5)  %


 


Plt Count   97 L   (150-450)  k/uL


 


Neutrophils %   72   %


 


Lymphocytes %   15   %


 


Monocytes %   9   %


 


Eosinophils %   1   %


 


Basophils %   0   %


 


Neutrophils #   2.5   (1.3-7.7)  k/uL


 


Lymphocytes #   0.5 L   (1.0-4.8)  k/uL


 


Monocytes #   0.3   (0-1.0)  k/uL


 


Eosinophils #   0.1   (0-0.7)  k/uL


 


Basophils #   0.0   (0-0.2)  k/uL


 


Manual Slide Review   Performed   


 


Hypochromasia   Slight   


 


Anisocytosis   Slight   


 


Microcytosis   Slight   


 


PT    12.4 H  (9.0-12.0)  sec


 


INR    1.2 H  (<1.2)  


 


APTT    24.4  (22.0-30.0)  sec


 


Sodium  137    (137-145)  mmol/L


 


Potassium  3.8    (3.5-5.1)  mmol/L


 


Chloride  96 L    ()  mmol/L


 


Carbon Dioxide  34 H    (22-30)  mmol/L


 


Anion Gap  7    mmol/L


 


BUN  16    (7-17)  mg/dL


 


Creatinine  0.84    (0.52-1.04)  mg/dL


 


Est GFR (CKD-EPI)AfAm  78    (>60 ml/min/1.73 sqM)  


 


Est GFR (CKD-EPI)NonAf  67    (>60 ml/min/1.73 sqM)  


 


Glucose  178 H    (74-99)  mg/dL


 


Calcium  9.3    (8.4-10.2)  mg/dL


 


Total Bilirubin  1.2    (0.2-1.3)  mg/dL


 


AST  33    (14-36)  U/L


 


ALT  14    (4-34)  U/L


 


Alkaline Phosphatase  55    ()  U/L


 


Total Protein  5.8 L    (6.3-8.2)  g/dL


 


Albumin  3.1 L    (3.5-5.0)  g/dL


 


Amylase  <30 L    ()  U/L


 


Lipase  187    ()  U/L


 


Urine Color     


 


Urine Appearance     (Clear)  


 


Urine pH     (5.0-8.0)  


 


Ur Specific Gravity     (1.001-1.035)  


 


Urine Protein     (Negative)  


 


Urine Glucose (UA)     (Negative)  


 


Urine Ketones     (Negative)  


 


Urine Blood     (Negative)  


 


Urine Nitrite     (Negative)  


 


Urine Bilirubin     (Negative)  


 


Urine Urobilinogen     (<2.0)  mg/dL


 


Ur Leukocyte Esterase     (Negative)  


 


Urine WBC     (0-5)  /hpf


 


Ur Squamous Epith Cells     (0-4)  /hpf


 


Hyaline Casts     (0-2)  /lpf


 


Urine Mucus     (None)  /hpf


 


Hepatitis A IgM Ab     














  01/14/20 01/14/20 Range/Units





  11:30 11:57 


 


WBC    (3.8-10.6)  k/uL


 


RBC    (3.80-5.40)  m/uL


 


Hgb    (11.4-16.0)  gm/dL


 


Hct    (34.0-46.0)  %


 


MCV    (80.0-100.0)  fL


 


MCH    (25.0-35.0)  pg


 


MCHC    (31.0-37.0)  g/dL


 


RDW    (11.5-15.5)  %


 


Plt Count    (150-450)  k/uL


 


Neutrophils %    %


 


Lymphocytes %    %


 


Monocytes %    %


 


Eosinophils %    %


 


Basophils %    %


 


Neutrophils #    (1.3-7.7)  k/uL


 


Lymphocytes #    (1.0-4.8)  k/uL


 


Monocytes #    (0-1.0)  k/uL


 


Eosinophils #    (0-0.7)  k/uL


 


Basophils #    (0-0.2)  k/uL


 


Manual Slide Review    


 


Hypochromasia    


 


Anisocytosis    


 


Microcytosis    


 


PT    (9.0-12.0)  sec


 


INR    (<1.2)  


 


APTT    (22.0-30.0)  sec


 


Sodium    (137-145)  mmol/L


 


Potassium    (3.5-5.1)  mmol/L


 


Chloride    ()  mmol/L


 


Carbon Dioxide    (22-30)  mmol/L


 


Anion Gap    mmol/L


 


BUN    (7-17)  mg/dL


 


Creatinine    (0.52-1.04)  mg/dL


 


Est GFR (CKD-EPI)AfAm    (>60 ml/min/1.73 sqM)  


 


Est GFR (CKD-EPI)NonAf    (>60 ml/min/1.73 sqM)  


 


Glucose    (74-99)  mg/dL


 


Calcium    (8.4-10.2)  mg/dL


 


Total Bilirubin    (0.2-1.3)  mg/dL


 


AST    (14-36)  U/L


 


ALT    (4-34)  U/L


 


Alkaline Phosphatase    ()  U/L


 


Total Protein    (6.3-8.2)  g/dL


 


Albumin    (3.5-5.0)  g/dL


 


Amylase    ()  U/L


 


Lipase    ()  U/L


 


Urine Color  Yellow   


 


Urine Appearance  Clear   (Clear)  


 


Urine pH  8.0   (5.0-8.0)  


 


Ur Specific Gravity  1.010   (1.001-1.035)  


 


Urine Protein  Negative   (Negative)  


 


Urine Glucose (UA)  Negative   (Negative)  


 


Urine Ketones  Negative   (Negative)  


 


Urine Blood  Negative   (Negative)  


 


Urine Nitrite  Negative   (Negative)  


 


Urine Bilirubin  Negative   (Negative)  


 


Urine Urobilinogen  2.0   (<2.0)  mg/dL


 


Ur Leukocyte Esterase  Trace H   (Negative)  


 


Urine WBC  3   (0-5)  /hpf


 


Ur Squamous Epith Cells  6 H   (0-4)  /hpf


 


Hyaline Casts  1   (0-2)  /lpf


 


Urine Mucus  Rare H   (None)  /hpf


 


Hepatitis A IgM Ab   NEGATIVE  














- Radiology Data


Radiology results: image reviewed (Abdominal x-ray shows possible underlying 

ascites.  Postoperative change.  Nonobstructive bowel gas pattern.  Left pleural

effusion.)





Disposition


Clinical Impression: 


 Ascites





Disposition: HOME SELF-CARE


Condition: Stable


Instructions (If sedation given, give patient instructions):  Ascites (ED)


Additional Instructions: 


Please follow-up with primary care physician and your gastrologist in the next 

day or 2 for recheck.  Return for increased pain, fever, worsening or change in 

symptoms or other concerns.


Is patient prescribed a controlled substance at d/c from ED?: No


Referrals: 


Susan King MD [Primary Care Provider] - 1-2 days


Time of Disposition: 16:22

## 2020-02-19 ENCOUNTER — HOSPITAL ENCOUNTER (INPATIENT)
Dept: HOSPITAL 47 - EC | Age: 80
LOS: 1 days | Discharge: HOME | DRG: 433 | End: 2020-02-20
Attending: INTERNAL MEDICINE | Admitting: INTERNAL MEDICINE
Payer: MEDICARE

## 2020-02-19 DIAGNOSIS — Z85.3: ICD-10-CM

## 2020-02-19 DIAGNOSIS — Z79.84: ICD-10-CM

## 2020-02-19 DIAGNOSIS — R18.8: ICD-10-CM

## 2020-02-19 DIAGNOSIS — Z90.49: ICD-10-CM

## 2020-02-19 DIAGNOSIS — Z82.49: ICD-10-CM

## 2020-02-19 DIAGNOSIS — F32.9: ICD-10-CM

## 2020-02-19 DIAGNOSIS — N17.9: ICD-10-CM

## 2020-02-19 DIAGNOSIS — D69.59: ICD-10-CM

## 2020-02-19 DIAGNOSIS — I10: ICD-10-CM

## 2020-02-19 DIAGNOSIS — K74.60: Primary | ICD-10-CM

## 2020-02-19 DIAGNOSIS — E11.9: ICD-10-CM

## 2020-02-19 DIAGNOSIS — Z98.890: ICD-10-CM

## 2020-02-19 DIAGNOSIS — K76.6: ICD-10-CM

## 2020-02-19 DIAGNOSIS — Z79.899: ICD-10-CM

## 2020-02-19 DIAGNOSIS — Z80.0: ICD-10-CM

## 2020-02-19 LAB
ALBUMIN SERPL-MCNC: 3.6 G/DL (ref 3.5–5)
ALP SERPL-CCNC: 56 U/L (ref 38–126)
ALT SERPL-CCNC: 15 U/L (ref 4–34)
AMYLASE SERPL-CCNC: <30 U/L (ref 30–110)
ANION GAP SERPL CALC-SCNC: 14 MMOL/L
AST SERPL-CCNC: 30 U/L (ref 14–36)
BASOPHILS # BLD AUTO: 0 K/UL (ref 0–0.2)
BASOPHILS NFR BLD AUTO: 0 %
BUN SERPL-SCNC: 21 MG/DL (ref 7–17)
CALCIUM SPEC-MCNC: 9.4 MG/DL (ref 8.4–10.2)
CHLORIDE SERPL-SCNC: 100 MMOL/L (ref 98–107)
CO2 SERPL-SCNC: 25 MMOL/L (ref 22–30)
EOSINOPHIL # BLD AUTO: 0 K/UL (ref 0–0.7)
EOSINOPHIL NFR BLD AUTO: 1 %
ERYTHROCYTE [DISTWIDTH] IN BLOOD BY AUTOMATED COUNT: 4.35 M/UL (ref 3.8–5.4)
ERYTHROCYTE [DISTWIDTH] IN BLOOD: 16.1 % (ref 11.5–15.5)
GLUCOSE BLD-MCNC: 145 MG/DL (ref 75–99)
GLUCOSE SERPL-MCNC: 186 MG/DL (ref 74–99)
HCT VFR BLD AUTO: 34.4 % (ref 34–46)
HGB BLD-MCNC: 10.8 GM/DL (ref 11.4–16)
INR PPP: 1.4 (ref ?–1.2)
LYMPHOCYTES # SPEC AUTO: 0.3 K/UL (ref 1–4.8)
LYMPHOCYTES NFR SPEC AUTO: 10 %
MCH RBC QN AUTO: 24.8 PG (ref 25–35)
MCHC RBC AUTO-ENTMCNC: 31.4 G/DL (ref 31–37)
MCV RBC AUTO: 79 FL (ref 80–100)
MONOCYTES # BLD AUTO: 0.3 K/UL (ref 0–1)
MONOCYTES NFR BLD AUTO: 7 %
NEUTROPHILS # BLD AUTO: 2.8 K/UL (ref 1.3–7.7)
NEUTROPHILS NFR BLD AUTO: 80 %
PLATELET # BLD AUTO: 91 K/UL (ref 150–450)
POTASSIUM SERPL-SCNC: 3.9 MMOL/L (ref 3.5–5.1)
PROT SERPL-MCNC: 6.6 G/DL (ref 6.3–8.2)
PT BLD: 13.6 SEC (ref 9–12)
SODIUM SERPL-SCNC: 139 MMOL/L (ref 137–145)
WBC # BLD AUTO: 3.5 K/UL (ref 3.8–10.6)

## 2020-02-19 PROCEDURE — 82150 ASSAY OF AMYLASE: CPT

## 2020-02-19 PROCEDURE — 96374 THER/PROPH/DIAG INJ IV PUSH: CPT

## 2020-02-19 PROCEDURE — 93005 ELECTROCARDIOGRAM TRACING: CPT

## 2020-02-19 PROCEDURE — 96361 HYDRATE IV INFUSION ADD-ON: CPT

## 2020-02-19 PROCEDURE — 82140 ASSAY OF AMMONIA: CPT

## 2020-02-19 PROCEDURE — 71045 X-RAY EXAM CHEST 1 VIEW: CPT

## 2020-02-19 PROCEDURE — 80053 COMPREHEN METABOLIC PANEL: CPT

## 2020-02-19 PROCEDURE — 96375 TX/PRO/DX INJ NEW DRUG ADDON: CPT

## 2020-02-19 PROCEDURE — 85025 COMPLETE CBC W/AUTO DIFF WBC: CPT

## 2020-02-19 PROCEDURE — 83690 ASSAY OF LIPASE: CPT

## 2020-02-19 PROCEDURE — 49083 ABD PARACENTESIS W/IMAGING: CPT

## 2020-02-19 PROCEDURE — 36415 COLL VENOUS BLD VENIPUNCTURE: CPT

## 2020-02-19 PROCEDURE — 85610 PROTHROMBIN TIME: CPT

## 2020-02-19 PROCEDURE — 99285 EMERGENCY DEPT VISIT HI MDM: CPT

## 2020-02-19 PROCEDURE — 76705 ECHO EXAM OF ABDOMEN: CPT

## 2020-02-19 PROCEDURE — 74018 RADEX ABDOMEN 1 VIEW: CPT

## 2020-02-19 RX ADMIN — SERTRALINE HYDROCHLORIDE SCH MG: 50 TABLET, FILM COATED ORAL at 10:17

## 2020-02-19 RX ADMIN — PANTOPRAZOLE SODIUM SCH MG: 40 INJECTION, POWDER, FOR SOLUTION INTRAVENOUS at 12:30

## 2020-02-19 RX ADMIN — HEPARIN SODIUM SCH UNIT: 5000 INJECTION, SOLUTION INTRAVENOUS; SUBCUTANEOUS at 23:45

## 2020-02-19 RX ADMIN — SPIRONOLACTONE SCH MG: 25 TABLET, FILM COATED ORAL at 09:31

## 2020-02-19 RX ADMIN — LINAGLIPTIN SCH MG: 5 TABLET, FILM COATED ORAL at 09:31

## 2020-02-19 RX ADMIN — HEPARIN SODIUM SCH UNIT: 5000 INJECTION, SOLUTION INTRAVENOUS; SUBCUTANEOUS at 18:17

## 2020-02-19 RX ADMIN — HEPARIN SODIUM SCH: 5000 INJECTION, SOLUTION INTRAVENOUS; SUBCUTANEOUS at 18:17

## 2020-02-19 RX ADMIN — PIOGLITAZONE SCH MG: 30 TABLET ORAL at 09:31

## 2020-02-19 NOTE — US
EXAMINATION TYPE: US abdomen limited

 

DATE OF EXAM: 2/19/2020

 

COMPARISON: US

 

CLINICAL HISTORY: Ascites.

 

Ascites is noted in all 4 abdominal quadrants with largest ascites noted in LLQ = 7.1cm A/P.

 

 

 

IMPRESSION:  

1. Ascites

## 2020-02-19 NOTE — XR
EXAMINATION TYPE: XR KUB

 

DATE OF EXAM: 2/19/2020

 

COMPARISON: 1/14/2020

 

HISTORY: Abdominal pain

 

TECHNIQUE: 2 views upright

 

FINDINGS: There is no sign of intestinal obstruction or pneumoperitoneum. Fecal pattern is normal. Th
ere are clips from cholecystectomy. There is increased density over the abdomen that could relate to 
ascites. There are no pathologic calcifications over the kidneys.

 

IMPRESSION: Possible abdominal ascites. No bowel obstruction. No significant change compared to last 
exam.

## 2020-02-19 NOTE — ED
General Adult HPI





- General


Chief complaint: Abdominal Pain


Stated complaint: Fluid Retention


Time Seen by Provider: 02/19/20 06:05


Source: patient, family, RN notes reviewed


Mode of arrival: wheelchair


Limitations: physical limitation





- History of Present Illness


Initial comments: 





79-year-old female with a past medical history of hypertension, liver disease, 

diabetes mellitus, breast cancer, ascites presents to the emergency department 

for a chief complaint of abdominal pain.  Patient states she feels she needs a 

paracentesis performed.  Patient had an appointment for this Monday but was 

having diarrhea so did not go to her appointment.  Patient states the pain is 

getting worse.  States it is now causing shortness of breath.  Patient is also 

not having associated nausea vomiting.  Patient has no other complaints at this 

time including shortness of breath, chest pain,  headache, or visual changes.





- Related Data


                                Home Medications











 Medication  Instructions  Recorded  Confirmed


 


Cholecalciferol (Vitamin D3) 2,000 unit PO DAILY 11/09/19 02/19/20





[Vitamin D3]   


 


Lisinopril [Zestril] 10 mg PO DAILY 11/09/19 02/19/20


 


Pioglitazone [Actos] 30 mg PO DAILY 11/09/19 02/19/20


 


Sertraline [Zoloft] 50 mg PO DAILY 11/09/19 02/19/20


 


metFORMIN HCL 1,000 mg PO BID 11/09/19 02/19/20


 


sitaGLIPtin PHOSPHATE [Januvia] 100 mg PO DAILY 11/09/19 02/19/20


 


Spironolactone [Aldactone] 50 mg PO DAILY 01/14/20 02/19/20


 


Vitamin B Complex 1 cap PO DAILY 02/07/20 02/19/20








                                  Previous Rx's











 Medication  Instructions  Recorded


 


Cyanocobalamin [Vitamin B-12] 1,000 mcg PO DAILY #60 tab 11/12/19


 


Furosemide [Lasix] 40 mg PO DAILY #30 tablet 11/12/19











                                    Allergies











Allergy/AdvReac Type Severity Reaction Status Date / Time


 


No Known Allergies Allergy   Verified 02/19/20 08:16














Review of Systems


ROS Statement: 


Those systems with pertinent positive or pertinent negative responses have been 

documented in the HPI.





ROS Other: All systems not noted in ROS Statement are negative.





Past Medical History


Past Medical History: Cancer, Diabetes Mellitus, Hypertension, Liver Disease, 

Skin Disorder


Additional Past Medical History / Comment(s): breast ca left  ,psorias, ascites


History of Any Multi-Drug Resistant Organisms: None Reported


Past Surgical History: Breast Surgery, Cholecystectomy


Additional Past Surgical History / Comment(s): Right breast lump removal, 

paracentesis


Past Anesthesia/Blood Transfusion Reactions: No Reported Reaction


Past Psychological History: Depression


Smoking Status: Never smoker


Past Alcohol Use History: None Reported


Past Drug Use History: None Reported





- Past Family History


  ** Father


Family Medical History: Cancer


Additional Family Medical History / Comment(s): Colon cancer





  ** Brother(s)


Family Medical History: Myocardial Infarction (MI)


Additional Family Medical History / Comment(s): stents, another brother passed 

away from an MI





General Exam


Limitations: physical limitation





Course


                                   Vital Signs











  02/19/20 02/19/20





  05:29 08:00


 


Temperature 98.1 F 


 


Pulse Rate 118 H 105 H


 


Respiratory 20 16





Rate  


 


Blood Pressure 177/67 114/65


 


O2 Sat by Pulse 100 97





Oximetry  














EKG Findings





- EKG Comments:


EKG Findings:: Sinus tachycardia, multifocal atrial tachycardia, ventricular 

rate 109, , 





Medical Decision Making





- Medical Decision Making





Vitals are stable.  Patient initially tachycardic this did improve somewhat.  

EKG shows evidence of multifocal atrial tachycardia with PACs.  Also reviewed by

 Dr. Burton.  Patient is complaining of abdominal pain which feels consistent

 with previous ascites.  Patient missed her appointment for a paracentesis 2 

days ago because of diarrhea.  Patient states she feels she needs a paracentesis

 at this time.  Denies fevers or chills.  Abdominal exam revealed very minimal 

generalized abdominal tenderness.  No leukocytosis on CBC.  Hemoglobin is stable

 as well as stable thrombocytopenia.  CMP shows minimal elevation in creatinine,

 patient was given a 500 mL fluid bolus given history of recent diarrhea.  X-ray

 KUB did show possible abdominal ascites without bowel obstruction.  Chest x-ray

 revealed a probable small left pleural effusion on the basis of an increased 

basilar density.  Dr. Burton spoke with Dr. Rodarte who will admit this 

patient.  Interventional radiology will be consulted for paracentesis.





- Lab Data


Result diagrams: 


                                 02/19/20 06:56





                                 02/19/20 06:56


                                   Lab Results











  02/19/20 02/19/20 Range/Units





  06:56 06:56 


 


WBC   3.5 L  (3.8-10.6)  k/uL


 


RBC   4.35  (3.80-5.40)  m/uL


 


Hgb   10.8 L  (11.4-16.0)  gm/dL


 


Hct   34.4  (34.0-46.0)  %


 


MCV   79.0 L  (80.0-100.0)  fL


 


MCH   24.8 L  (25.0-35.0)  pg


 


MCHC   31.4  (31.0-37.0)  g/dL


 


RDW   16.1 H  (11.5-15.5)  %


 


Plt Count   91 L  (150-450)  k/uL


 


Neutrophils %   80  %


 


Lymphocytes %   10  %


 


Monocytes %   7  %


 


Eosinophils %   1  %


 


Basophils %   0  %


 


Neutrophils #   2.8  (1.3-7.7)  k/uL


 


Lymphocytes #   0.3 L  (1.0-4.8)  k/uL


 


Monocytes #   0.3  (0-1.0)  k/uL


 


Eosinophils #   0.0  (0-0.7)  k/uL


 


Basophils #   0.0  (0-0.2)  k/uL


 


Manual Slide Review   Performed  


 


Hypochromasia   Slight  


 


Anisocytosis   Slight  


 


Microcytosis   Slight  


 


Sodium  139   (137-145)  mmol/L


 


Potassium  3.9   (3.5-5.1)  mmol/L


 


Chloride  100   ()  mmol/L


 


Carbon Dioxide  25   (22-30)  mmol/L


 


Anion Gap  14   mmol/L


 


BUN  21 H   (7-17)  mg/dL


 


Creatinine  1.24 H   (0.52-1.04)  mg/dL


 


Est GFR (CKD-EPI)AfAm  48   (>60 ml/min/1.73 sqM)  


 


Est GFR (CKD-EPI)NonAf  42   (>60 ml/min/1.73 sqM)  


 


Glucose  186 H   (74-99)  mg/dL


 


Calcium  9.4   (8.4-10.2)  mg/dL


 


Total Bilirubin  1.4 H   (0.2-1.3)  mg/dL


 


AST  30   (14-36)  U/L


 


ALT  15   (4-34)  U/L


 


Alkaline Phosphatase  56   ()  U/L


 


Total Protein  6.6   (6.3-8.2)  g/dL


 


Albumin  3.6   (3.5-5.0)  g/dL


 


Amylase  <30 L   ()  U/L


 


Lipase  229   ()  U/L














Disposition


Clinical Impression: 


 Pancytopenia, Abdominal pain, Ascites, Fluid overload, Pleural effusion





Disposition: ADMITTED AS IP TO THIS HOSP


Condition: Fair


Is patient prescribed a controlled substance at d/c from ED?: No


Referrals: 


Susan King MD [Primary Care Provider] - 1-2 days


Time of Disposition: 08:38

## 2020-02-19 NOTE — XR
EXAMINATION TYPE: XR chest 1V portable

 

DATE OF EXAM: 2/19/2020

 

COMPARISON: Chest x-ray 11/9/2019

 

HISTORY: Fluid overload

 

TECHNIQUE: Single frontal view of the chest is obtained.

 

FINDINGS:  There is persistent blunting the left costophrenic angle, improved visualization of the ri
ght costophrenic angle. Patient is rotated. Heart size is likely stable. No evident pneumothorax. The
re are overlying cardiac leads. Basilar increased density is noted.

 

IMPRESSION:  Rotated exam. Probable basilar atelectatic changes probable small left pleural effusion

## 2020-02-19 NOTE — P.HPIM
History of Present Illness


This is a pleasant 79-year-old female with known history of nonalcoholic 

cirrhosis given with complaints of increasing abdominal distention denied any 

abdominal pain but some pressure in the chest because of his had abdominal 

distention clinically patient didn't have significant ascites noted denied any 

significant abdominal pain no tenderness of and on exam my suspicion is low for 

spontaneous Bactrim bronchitis clinically.  Although clinically there is no 

significant ascites upon doing an ultrasound patient appears to have significant

ascites because of which I ordered an ultrasound-guided paracentesis meantime 

patient will be started on IV Lasix and Aldactone, patient's serum creatinine is

bit elevated to 1.24 baseline is around 0.8 patient on 40 mg of oral Lasix at 

home along with Aldactone.,  Patient was extensively evaluated for etiology of 

cirrhosis was told because of the medications unsure which medication she is 

started about.  There is a small left-sided pleural effusion on the chest x-ray














Review of Systems











REVIEW OF SYSTEMS: 


CONSTITUTIONAL: No fever, no malaise, no fatigue. 


HEENT: No recent visual problems or hearing problems. Denied any sore throat. 


CARDIOVASCULAR: No chest pain, orthopnea, PND, no palpitations, no syncope. 


PULMONARY: No shortness of breath, no cough, no hemoptysis. 


GASTROINTESTINAL: No diarrhea, no nausea, no vomiting.


NEUROLOGICAL: No headaches, no weakness, no numbness. 


HEMATOLOGICAL: Denies any bleeding or petechiae. 


GENITOURINARY: Denies any burning micturition, frequency, or urgency. 


MUSCULOSKELETAL/RHEUMATOLOGICAL: Denies any joint pain, swelling, or any muscle 

pain. 


ENDOCRINE: Denies any polyuria or polydipsia. 





The rest of the 14-point review of systems is negative.








Past Medical History


Past Medical History: Cancer, Diabetes Mellitus, Hypertension, Liver Disease, 

Skin Disorder


Additional Past Medical History / Comment(s): breast ca left  ,psorias, ascites


History of Any Multi-Drug Resistant Organisms: None Reported


Past Surgical History: Breast Surgery, Cholecystectomy


Additional Past Surgical History / Comment(s): Right breast lump removal, 

paracentesis


Past Anesthesia/Blood Transfusion Reactions: No Reported Reaction


Smoking Status: Never smoker





- Past Family History


  ** Father


Family Medical History: Cancer


Additional Family Medical History / Comment(s): Colon cancer





  ** Brother(s)


Family Medical History: Myocardial Infarction (MI)


Additional Family Medical History / Comment(s): stents, another brother passed 

away from an MI





Medications and Allergies


                                Home Medications











 Medication  Instructions  Recorded  Confirmed  Type


 


Cholecalciferol (Vitamin D3) 2,000 unit PO DAILY 11/09/19 02/19/20 History





[Vitamin D3]    


 


Lisinopril [Zestril] 10 mg PO DAILY 11/09/19 02/19/20 History


 


Pioglitazone [Actos] 30 mg PO DAILY 11/09/19 02/19/20 History


 


Sertraline [Zoloft] 50 mg PO DAILY 11/09/19 02/19/20 History


 


metFORMIN HCL 1,000 mg PO BID 11/09/19 02/19/20 History


 


sitaGLIPtin PHOSPHATE [Januvia] 100 mg PO DAILY 11/09/19 02/19/20 History


 


Cyanocobalamin [Vitamin B-12] 1,000 mcg PO DAILY #60 tab 11/12/19 02/19/20 Rx


 


Furosemide [Lasix] 40 mg PO DAILY #30 tablet 11/12/19 02/19/20 Rx


 


Spironolactone [Aldactone] 50 mg PO DAILY 01/14/20 02/19/20 History


 


Vitamin B Complex 1 cap PO DAILY 02/07/20 02/19/20 History








                                    Allergies











Allergy/AdvReac Type Severity Reaction Status Date / Time


 


No Known Allergies Allergy   Verified 02/19/20 08:16














Physical Exam


Vitals: 


                                   Vital Signs











  Temp Pulse Resp BP Pulse Ox


 


 02/19/20 14:44    18  


 


 02/19/20 11:13    16   97


 


 02/19/20 10:00    16   97


 


 02/19/20 09:00    16   97


 


 02/19/20 08:00   105 H  16  114/65  97


 


 02/19/20 05:29  98.1 F  118 H  20  177/67  100








                                Intake and Output











 02/18/20 02/19/20 02/19/20





 22:59 06:59 14:59


 


Other:   


 


  Weight  89.811 kg 89.811 kg

















PHYSICAL EXAMINATION: 





GENERAL: The patient is alert and oriented x3, not in any acute distress. Well d

eveloped, well nourished. 


HEENT: Pupils are round and equally reacting to light. EOMI. No scleral icterus.

 No conjunctival pallor. Normocephalic, atraumatic. No pharyngeal erythema. No 

thyromegaly. 


CARDIOVASCULAR: S1 and S2 present. No murmurs, rubs, or gallops. 


PULMONARY: Chest is clear to auscultation, no wheezing or crackles. 


ABDOMEN: Soft, distended with some shifting dullness but no significant ascites 

clinically


MUSCULOSKELETAL: No joint swelling or deformity.


EXTREMITIES: No cyanosis, clubbing, or pedal edema. 


NEUROLOGICAL: Gross neurological examination did not reveal any focal deficits. 


SKIN: No rashes. 

















Results


CBC & Chem 7: 


                                 02/19/20 06:56





                                 02/19/20 06:56


Labs: 


                  Abnormal Lab Results - Last 24 Hours (Table)











  02/19/20 02/19/20 02/19/20 Range/Units





  06:56 06:56 06:56 


 


WBC   3.5 L   (3.8-10.6)  k/uL


 


Hgb   10.8 L   (11.4-16.0)  gm/dL


 


MCV   79.0 L   (80.0-100.0)  fL


 


MCH   24.8 L   (25.0-35.0)  pg


 


RDW   16.1 H   (11.5-15.5)  %


 


Plt Count   91 L   (150-450)  k/uL


 


Lymphocytes #   0.3 L   (1.0-4.8)  k/uL


 


PT    13.6 H  (9.0-12.0)  sec


 


INR    1.4 H  (<1.2)  


 


BUN  21 H    (7-17)  mg/dL


 


Creatinine  1.24 H    (0.52-1.04)  mg/dL


 


Glucose  186 H    (74-99)  mg/dL


 


Total Bilirubin  1.4 H    (0.2-1.3)  mg/dL


 


Amylase  <30 L    ()  U/L














Assessment and Plan


Plan: 


-Symptomatic ascites: Secondary to nonalcoholic cirrhosis, patient will be 

started on IV Lasix instead of oral and ultrasound-guided paracentesis will be 

ordered possibly of discharge tomorrow after the paracentesis and will decide on

 the dose of Lasix upon discharge.  No significant clinical evidence of 

spontaneous pectoral peritonitis


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

 hypertension


-Type 2 diabetes mellitus: Foreign will be discontinued because of cirrhosis.  

linagliptan Will be continued patient will be on sliding scale insulin


-Hypertension


-Depression


-Acute renal failure: Secondary to prerenal azotemia from cirrhosis and 

expecting this to improve with Lasix and gentle diuresis


DVT prophylaxis with subcutaneous heparin

## 2020-02-20 VITALS
DIASTOLIC BLOOD PRESSURE: 57 MMHG | SYSTOLIC BLOOD PRESSURE: 107 MMHG | HEART RATE: 87 BPM | RESPIRATION RATE: 18 BRPM | TEMPERATURE: 98 F

## 2020-02-20 LAB
ALBUMIN SERPL-MCNC: 3 G/DL (ref 3.5–5)
ALP SERPL-CCNC: 37 U/L (ref 38–126)
ALT SERPL-CCNC: 13 U/L (ref 4–34)
ANION GAP SERPL CALC-SCNC: 10 MMOL/L
AST SERPL-CCNC: 26 U/L (ref 14–36)
BUN SERPL-SCNC: 26 MG/DL (ref 7–17)
CALCIUM SPEC-MCNC: 8.5 MG/DL (ref 8.4–10.2)
CHLORIDE SERPL-SCNC: 102 MMOL/L (ref 98–107)
CO2 SERPL-SCNC: 27 MMOL/L (ref 22–30)
GLUCOSE BLD-MCNC: 147 MG/DL (ref 75–99)
GLUCOSE BLD-MCNC: 148 MG/DL (ref 75–99)
GLUCOSE SERPL-MCNC: 138 MG/DL (ref 74–99)
POTASSIUM SERPL-SCNC: 4.2 MMOL/L (ref 3.5–5.1)
PROT SERPL-MCNC: 5.6 G/DL (ref 6.3–8.2)
SODIUM SERPL-SCNC: 139 MMOL/L (ref 137–145)

## 2020-02-20 PROCEDURE — 0W9G3ZZ DRAINAGE OF PERITONEAL CAVITY, PERCUTANEOUS APPROACH: ICD-10-PCS

## 2020-02-20 RX ADMIN — SERTRALINE HYDROCHLORIDE SCH MG: 50 TABLET, FILM COATED ORAL at 09:30

## 2020-02-20 RX ADMIN — LINAGLIPTIN SCH MG: 5 TABLET, FILM COATED ORAL at 09:30

## 2020-02-20 RX ADMIN — PIOGLITAZONE SCH MG: 30 TABLET ORAL at 09:29

## 2020-02-20 RX ADMIN — SPIRONOLACTONE SCH: 25 TABLET, FILM COATED ORAL at 09:29

## 2020-02-20 RX ADMIN — PANTOPRAZOLE SODIUM SCH MG: 40 INJECTION, POWDER, FOR SOLUTION INTRAVENOUS at 12:14

## 2020-02-20 NOTE — US
EXAMINATION TYPE: US paracentesis abd w/image

 

DATE OF EXAM: 2/20/2020

 

COMPARISON: NONE

 

HISTORY: Ascites.

 

PROCEDURE:

Maximal barrier technique was utilized.  The skin overlying a suitable pocket of fluid was localized 
with ultrasound and the overlying skin was prepped and draped.  Ultrasound was utilized with sterile 
technique. Lidocaine was used for local anesthesia and a skin nick made with a scalpel. Catheter was 
advanced under direct ultrasound guidance into a suitable pocket of fluid and approximately 5.1 liter
s of serous fluid were removed.  Catheter was withdrawn and hemostasis achieved.  There is no immedia
te complication; the patient is discharged in stable condition. 

 

IMPRESSION:  STATUS POST ULTRASOUND GUIDED PARACENTESIS FOR PALLIATION OF ASCITES.  THIS PROCEDURE WA
S PERFORMED BY THE UNDERSIGNED.

## 2020-03-09 ENCOUNTER — HOSPITAL ENCOUNTER (INPATIENT)
Dept: HOSPITAL 47 - EC | Age: 80
LOS: 11 days | Discharge: HOME | DRG: 280 | End: 2020-03-20
Attending: INTERNAL MEDICINE | Admitting: INTERNAL MEDICINE
Payer: MEDICARE

## 2020-03-09 VITALS — BODY MASS INDEX: 34.6 KG/M2

## 2020-03-09 DIAGNOSIS — Z79.899: ICD-10-CM

## 2020-03-09 DIAGNOSIS — D69.6: ICD-10-CM

## 2020-03-09 DIAGNOSIS — Z82.0: ICD-10-CM

## 2020-03-09 DIAGNOSIS — E11.65: ICD-10-CM

## 2020-03-09 DIAGNOSIS — I35.0: ICD-10-CM

## 2020-03-09 DIAGNOSIS — E87.2: ICD-10-CM

## 2020-03-09 DIAGNOSIS — H91.90: ICD-10-CM

## 2020-03-09 DIAGNOSIS — D50.9: ICD-10-CM

## 2020-03-09 DIAGNOSIS — T50.2X5A: ICD-10-CM

## 2020-03-09 DIAGNOSIS — K76.6: ICD-10-CM

## 2020-03-09 DIAGNOSIS — K75.81: ICD-10-CM

## 2020-03-09 DIAGNOSIS — E83.42: ICD-10-CM

## 2020-03-09 DIAGNOSIS — I11.0: ICD-10-CM

## 2020-03-09 DIAGNOSIS — D68.4: ICD-10-CM

## 2020-03-09 DIAGNOSIS — R57.0: ICD-10-CM

## 2020-03-09 DIAGNOSIS — N39.0: ICD-10-CM

## 2020-03-09 DIAGNOSIS — I25.10: ICD-10-CM

## 2020-03-09 DIAGNOSIS — E11.42: ICD-10-CM

## 2020-03-09 DIAGNOSIS — D73.1: ICD-10-CM

## 2020-03-09 DIAGNOSIS — Z82.49: ICD-10-CM

## 2020-03-09 DIAGNOSIS — I21.4: Primary | ICD-10-CM

## 2020-03-09 DIAGNOSIS — E86.1: ICD-10-CM

## 2020-03-09 DIAGNOSIS — L40.9: ICD-10-CM

## 2020-03-09 DIAGNOSIS — Z79.84: ICD-10-CM

## 2020-03-09 DIAGNOSIS — Z71.3: ICD-10-CM

## 2020-03-09 DIAGNOSIS — E87.1: ICD-10-CM

## 2020-03-09 DIAGNOSIS — F32.9: ICD-10-CM

## 2020-03-09 DIAGNOSIS — D61.818: ICD-10-CM

## 2020-03-09 DIAGNOSIS — Z85.3: ICD-10-CM

## 2020-03-09 DIAGNOSIS — Z90.49: ICD-10-CM

## 2020-03-09 DIAGNOSIS — I48.0: ICD-10-CM

## 2020-03-09 DIAGNOSIS — E66.9: ICD-10-CM

## 2020-03-09 DIAGNOSIS — R18.8: ICD-10-CM

## 2020-03-09 DIAGNOSIS — Z80.0: ICD-10-CM

## 2020-03-09 DIAGNOSIS — N17.0: ICD-10-CM

## 2020-03-09 DIAGNOSIS — I50.33: ICD-10-CM

## 2020-03-09 PROCEDURE — 99285 EMERGENCY DEPT VISIT HI MDM: CPT

## 2020-03-09 PROCEDURE — 71046 X-RAY EXAM CHEST 2 VIEWS: CPT

## 2020-03-09 PROCEDURE — 82570 ASSAY OF URINE CREATININE: CPT

## 2020-03-09 PROCEDURE — 80048 BASIC METABOLIC PNL TOTAL CA: CPT

## 2020-03-09 PROCEDURE — 83735 ASSAY OF MAGNESIUM: CPT

## 2020-03-09 PROCEDURE — 93017 CV STRESS TEST TRACING ONLY: CPT

## 2020-03-09 PROCEDURE — 93005 ELECTROCARDIOGRAM TRACING: CPT

## 2020-03-09 PROCEDURE — 83690 ASSAY OF LIPASE: CPT

## 2020-03-09 PROCEDURE — 85610 PROTHROMBIN TIME: CPT

## 2020-03-09 PROCEDURE — 83880 ASSAY OF NATRIURETIC PEPTIDE: CPT

## 2020-03-09 PROCEDURE — 96376 TX/PRO/DX INJ SAME DRUG ADON: CPT

## 2020-03-09 PROCEDURE — 84300 ASSAY OF URINE SODIUM: CPT

## 2020-03-09 PROCEDURE — 71045 X-RAY EXAM CHEST 1 VIEW: CPT

## 2020-03-09 PROCEDURE — 84443 ASSAY THYROID STIM HORMONE: CPT

## 2020-03-09 PROCEDURE — 82565 ASSAY OF CREATININE: CPT

## 2020-03-09 PROCEDURE — 96374 THER/PROPH/DIAG INJ IV PUSH: CPT

## 2020-03-09 PROCEDURE — 87086 URINE CULTURE/COLONY COUNT: CPT

## 2020-03-09 PROCEDURE — 82140 ASSAY OF AMMONIA: CPT

## 2020-03-09 PROCEDURE — 84450 TRANSFERASE (AST) (SGOT): CPT

## 2020-03-09 PROCEDURE — 80053 COMPREHEN METABOLIC PANEL: CPT

## 2020-03-09 PROCEDURE — 78452 HT MUSCLE IMAGE SPECT MULT: CPT

## 2020-03-09 PROCEDURE — 49083 ABD PARACENTESIS W/IMAGING: CPT

## 2020-03-09 PROCEDURE — 84484 ASSAY OF TROPONIN QUANT: CPT

## 2020-03-09 PROCEDURE — 82533 TOTAL CORTISOL: CPT

## 2020-03-09 PROCEDURE — 85730 THROMBOPLASTIN TIME PARTIAL: CPT

## 2020-03-09 PROCEDURE — 84460 ALANINE AMINO (ALT) (SGPT): CPT

## 2020-03-09 PROCEDURE — 85025 COMPLETE CBC W/AUTO DIFF WBC: CPT

## 2020-03-09 PROCEDURE — 83935 ASSAY OF URINE OSMOLALITY: CPT

## 2020-03-09 PROCEDURE — 81001 URINALYSIS AUTO W/SCOPE: CPT

## 2020-03-09 PROCEDURE — 81003 URINALYSIS AUTO W/O SCOPE: CPT

## 2020-03-09 PROCEDURE — 87040 BLOOD CULTURE FOR BACTERIA: CPT

## 2020-03-09 PROCEDURE — 36415 COLL VENOUS BLD VENIPUNCTURE: CPT

## 2020-03-09 PROCEDURE — 83605 ASSAY OF LACTIC ACID: CPT

## 2020-03-09 PROCEDURE — 96375 TX/PRO/DX INJ NEW DRUG ADDON: CPT

## 2020-03-09 PROCEDURE — 80061 LIPID PANEL: CPT

## 2020-03-09 PROCEDURE — 93306 TTE W/DOPPLER COMPLETE: CPT

## 2020-03-10 LAB
ALBUMIN SERPL-MCNC: 3.3 G/DL (ref 3.5–5)
ALP SERPL-CCNC: 75 U/L (ref 38–126)
ALT SERPL-CCNC: 18 U/L (ref 4–34)
ANION GAP SERPL CALC-SCNC: 9 MMOL/L
APTT BLD: 24.1 SEC (ref 22–30)
AST SERPL-CCNC: 26 U/L (ref 14–36)
BASOPHILS # BLD AUTO: 0 K/UL (ref 0–0.2)
BASOPHILS NFR BLD AUTO: 0 %
BUN SERPL-SCNC: 33 MG/DL (ref 7–17)
CALCIUM SPEC-MCNC: 9.3 MG/DL (ref 8.4–10.2)
CHLORIDE SERPL-SCNC: 96 MMOL/L (ref 98–107)
CO2 SERPL-SCNC: 27 MMOL/L (ref 22–30)
EOSINOPHIL # BLD AUTO: 0.1 K/UL (ref 0–0.7)
EOSINOPHIL NFR BLD AUTO: 1 %
ERYTHROCYTE [DISTWIDTH] IN BLOOD BY AUTOMATED COUNT: 4.43 M/UL (ref 3.8–5.4)
ERYTHROCYTE [DISTWIDTH] IN BLOOD: 16.3 % (ref 11.5–15.5)
GLUCOSE BLD-MCNC: 298 MG/DL (ref 75–99)
GLUCOSE BLD-MCNC: 304 MG/DL (ref 75–99)
GLUCOSE BLD-MCNC: 340 MG/DL (ref 75–99)
GLUCOSE SERPL-MCNC: 339 MG/DL (ref 74–99)
HCT VFR BLD AUTO: 35.1 % (ref 34–46)
HGB BLD-MCNC: 11.2 GM/DL (ref 11.4–16)
INR PPP: 1.2 (ref ?–1.2)
LYMPHOCYTES # SPEC AUTO: 0.6 K/UL (ref 1–4.8)
LYMPHOCYTES NFR SPEC AUTO: 10 %
MAGNESIUM SPEC-SCNC: 1.6 MG/DL (ref 1.6–2.3)
MCH RBC QN AUTO: 25.4 PG (ref 25–35)
MCHC RBC AUTO-ENTMCNC: 32 G/DL (ref 31–37)
MCV RBC AUTO: 79.2 FL (ref 80–100)
MONOCYTES # BLD AUTO: 0.4 K/UL (ref 0–1)
MONOCYTES NFR BLD AUTO: 7 %
NEUTROPHILS # BLD AUTO: 4.9 K/UL (ref 1.3–7.7)
NEUTROPHILS NFR BLD AUTO: 81 %
PLATELET # BLD AUTO: 129 K/UL (ref 150–450)
POTASSIUM SERPL-SCNC: 5.1 MMOL/L (ref 3.5–5.1)
PROT SERPL-MCNC: 6.2 G/DL (ref 6.3–8.2)
PT BLD: 12.1 SEC (ref 9–12)
SODIUM SERPL-SCNC: 132 MMOL/L (ref 137–145)
WBC # BLD AUTO: 6.1 K/UL (ref 3.8–10.6)

## 2020-03-10 RX ADMIN — SPIRONOLACTONE SCH MG: 25 TABLET, FILM COATED ORAL at 08:26

## 2020-03-10 RX ADMIN — LINAGLIPTIN SCH MG: 5 TABLET, FILM COATED ORAL at 08:26

## 2020-03-10 RX ADMIN — FUROSEMIDE SCH MG: 40 TABLET ORAL at 08:26

## 2020-03-10 RX ADMIN — GABAPENTIN SCH MG: 100 CAPSULE ORAL at 22:24

## 2020-03-10 RX ADMIN — INSULIN ASPART SCH UNIT: 100 INJECTION, SOLUTION INTRAVENOUS; SUBCUTANEOUS at 21:23

## 2020-03-10 RX ADMIN — FUROSEMIDE SCH MG: 40 TABLET ORAL at 21:21

## 2020-03-10 RX ADMIN — PANTOPRAZOLE SODIUM SCH MG: 40 INJECTION, POWDER, FOR SOLUTION INTRAVENOUS at 08:26

## 2020-03-10 NOTE — ED
General Adult HPI





- General


Chief complaint: Chest Pain


Stated complaint: Abd pain


Time Seen by Provider: 03/09/20 23:47


Source: patient, family, RN notes reviewed, old records reviewed


Mode of arrival: ambulatory





- History of Present Illness


Initial comments: 





79-year-old female with liver cirrhosis, recurrent ascites presenting for 

evaluation of dyspnea, chest discomfort, abdominal distention.  She's had a poor

appetite over the past several days.  Pain complaints have been worsening over 

the past several days to one week.  She's had some nausea and several episodes 

of vomiting.  No known history of coronary artery disease.  No history of DVT or

PE.  She has some mild bilateral lower extremity swelling.  She states her last 

paracentesis was February 20.  She's had increased abdominal distention since 

that time.





- Related Data


                                Home Medications











 Medication  Instructions  Recorded  Confirmed


 


Cholecalciferol (Vitamin D3) 2,000 unit PO DAILY 11/09/19 02/19/20





[Vitamin D3]   


 


Pioglitazone [Actos] 30 mg PO DAILY 11/09/19 02/19/20


 


Sertraline [Zoloft] 50 mg PO DAILY 11/09/19 02/19/20


 


sitaGLIPtin PHOSPHATE [Januvia] 100 mg PO DAILY 11/09/19 02/19/20


 


Spironolactone [Aldactone] 50 mg PO DAILY 01/14/20 02/19/20


 


Vitamin B Complex 1 cap PO DAILY 02/07/20 02/19/20








                                  Previous Rx's











 Medication  Instructions  Recorded


 


Cyanocobalamin [Vitamin B-12] 1,000 mcg PO DAILY #60 tab 11/12/19


 


Furosemide [Lasix] 40 mg PO BID #30 tablet 02/20/20


 


Lisinopril [Zestril] 5 mg PO DAILY #0 02/20/20











                                    Allergies











Allergy/AdvReac Type Severity Reaction Status Date / Time


 


No Known Allergies Allergy   Verified 03/09/20 23:47














Review of Systems


ROS Statement: 


Those systems with pertinent positive or pertinent negative responses have been 

documented in the HPI.





ROS Other: All systems not noted in ROS Statement are negative.





Past Medical History


Past Medical History: Cancer, Diabetes Mellitus, Hypertension, Liver Disease, 

Skin Disorder


Additional Past Medical History / Comment(s): breast ca left  ,psorias, ascites


History of Any Multi-Drug Resistant Organisms: None Reported


Past Surgical History: Breast Surgery, Cholecystectomy


Additional Past Surgical History / Comment(s): Right breast lump removal, 

paracentesis


Past Anesthesia/Blood Transfusion Reactions: No Reported Reaction


Past Psychological History: Depression


Smoking Status: Never smoker


Past Alcohol Use History: None Reported


Past Drug Use History: None Reported





- Past Family History


  ** Father


Family Medical History: Cancer


Additional Family Medical History / Comment(s): Colon cancer





  ** Brother(s)


Family Medical History: Myocardial Infarction (MI)


Additional Family Medical History / Comment(s): stents, another brother passed 

away from an MI





General Exam


General appearance: alert, in no apparent distress


Head exam: Present: atraumatic, normocephalic


Eye exam: Present: normal appearance, PERRL.  Absent: scleral icterus


ENT exam: Present: normal exam


Neck exam: Present: normal inspection.  Absent: tenderness, meningismus


Respiratory exam: Present: decreased breath sounds.  Absent: respiratory 

distress


Cardiovascular Exam: Present: regular rate, normal rhythm, JVD


GI/Abdominal exam: Present: soft, distended, tenderness (Minimal abdominal 

tenderness, diffuse).  Absent: guarding, rebound


Extremities exam: Present: pedal edema


Neurological exam: Present: alert, oriented X3, CN II-XII intact.  Absent: motor

 sensory deficit


Psychiatric exam: Present: normal affect, normal mood


Skin exam: Present: warm, dry, pallor.  Absent: cyanosis, diaphoretic





Course


                                   Vital Signs











  03/09/20 03/10/20 03/10/20





  23:42 00:30 01:30


 


Temperature 98.2 F  


 


Pulse Rate 96 87 91


 


Respiratory 21 18 17





Rate   


 


Blood Pressure 118/64 145/61 130/54


 


O2 Sat by Pulse 100 97 98





Oximetry   














EKG Findings





- EKG Comments:


EKG Findings:: EKG: Normal sinus rhythm, no ST segment elevation, rate of 90, KS

 interval 150, QRS duration 70, 





Medical Decision Making





- Medical Decision Making





79-year-old female history of recurrent ascites requiring paracentesis, fluid 

overload secondary to liver cirrhosis.  She is presenting with 1 week of 

increased abdominal distention and bloating.  She's also had some mild chest 

discomfort over this week.  Workup reveals EKG which is sinus rhythm with no ST 

segment elevation.  Chest x-ray showing persistent left pleural effusion.  She 

has mild anemia which is stable.  No leukocytosis.  Ammonia level is normal.  

She has an elevated BNP consistent with fluid overload purchase a minimal 

troponin elevation 0.05.  Given her liver disease I suspect this is related to 

fluid overload rather than acute MI.  This level will be trended.





She will be admitted for symptom control, evaluation by interventional radiology

 regarding possible paracentesis.





- Lab Data


Result diagrams: 


                                 03/09/20 23:59





                                 03/09/20 23:59


                                   Lab Results











  03/09/20 03/09/20 03/09/20 Range/Units





  23:59 23:59 23:59 


 


WBC  6.1    (3.8-10.6)  k/uL


 


RBC  4.43    (3.80-5.40)  m/uL


 


Hgb  11.2 L    (11.4-16.0)  gm/dL


 


Hct  35.1    (34.0-46.0)  %


 


MCV  79.2 L    (80.0-100.0)  fL


 


MCH  25.4    (25.0-35.0)  pg


 


MCHC  32.0    (31.0-37.0)  g/dL


 


RDW  16.3 H    (11.5-15.5)  %


 


Plt Count  129 L    (150-450)  k/uL


 


Neutrophils %  81    %


 


Lymphocytes %  10    %


 


Monocytes %  7    %


 


Eosinophils %  1    %


 


Basophils %  0    %


 


Neutrophils #  4.9    (1.3-7.7)  k/uL


 


Lymphocytes #  0.6 L    (1.0-4.8)  k/uL


 


Monocytes #  0.4    (0-1.0)  k/uL


 


Eosinophils #  0.1    (0-0.7)  k/uL


 


Basophils #  0.0    (0-0.2)  k/uL


 


Anisocytosis  Slight    


 


Microcytosis  Slight    


 


PT   12.1 H   (9.0-12.0)  sec


 


INR   1.2 H   (<1.2)  


 


APTT   24.1   (22.0-30.0)  sec


 


Sodium    132 L  (137-145)  mmol/L


 


Potassium    5.1  (3.5-5.1)  mmol/L


 


Chloride    96 L  ()  mmol/L


 


Carbon Dioxide    27  (22-30)  mmol/L


 


Anion Gap    9  mmol/L


 


BUN    33 H  (7-17)  mg/dL


 


Creatinine    1.04  (0.52-1.04)  mg/dL


 


Est GFR (CKD-EPI)AfAm    60  (>60 ml/min/1.73 sqM)  


 


Est GFR (CKD-EPI)NonAf    52  (>60 ml/min/1.73 sqM)  


 


Glucose    339 H  (74-99)  mg/dL


 


Calcium    9.3  (8.4-10.2)  mg/dL


 


Magnesium    1.6  (1.6-2.3)  mg/dL


 


Total Bilirubin    1.3  (0.2-1.3)  mg/dL


 


AST    26  (14-36)  U/L


 


ALT    18  (4-34)  U/L


 


Alkaline Phosphatase    75  ()  U/L


 


Ammonia     (<30)  umol/L


 


Troponin I     (0.000-0.034)  ng/mL


 


NT-Pro-B Natriuret Pep     pg/mL


 


Total Protein    6.2 L  (6.3-8.2)  g/dL


 


Albumin    3.3 L  (3.5-5.0)  g/dL


 


Lipase    136  ()  U/L














  03/09/20 03/09/20 03/09/20 Range/Units





  23:59 23:59 23:59 


 


WBC     (3.8-10.6)  k/uL


 


RBC     (3.80-5.40)  m/uL


 


Hgb     (11.4-16.0)  gm/dL


 


Hct     (34.0-46.0)  %


 


MCV     (80.0-100.0)  fL


 


MCH     (25.0-35.0)  pg


 


MCHC     (31.0-37.0)  g/dL


 


RDW     (11.5-15.5)  %


 


Plt Count     (150-450)  k/uL


 


Neutrophils %     %


 


Lymphocytes %     %


 


Monocytes %     %


 


Eosinophils %     %


 


Basophils %     %


 


Neutrophils #     (1.3-7.7)  k/uL


 


Lymphocytes #     (1.0-4.8)  k/uL


 


Monocytes #     (0-1.0)  k/uL


 


Eosinophils #     (0-0.7)  k/uL


 


Basophils #     (0-0.2)  k/uL


 


Anisocytosis     


 


Microcytosis     


 


PT     (9.0-12.0)  sec


 


INR     (<1.2)  


 


APTT     (22.0-30.0)  sec


 


Sodium     (137-145)  mmol/L


 


Potassium     (3.5-5.1)  mmol/L


 


Chloride     ()  mmol/L


 


Carbon Dioxide     (22-30)  mmol/L


 


Anion Gap     mmol/L


 


BUN     (7-17)  mg/dL


 


Creatinine     (0.52-1.04)  mg/dL


 


Est GFR (CKD-EPI)AfAm     (>60 ml/min/1.73 sqM)  


 


Est GFR (CKD-EPI)NonAf     (>60 ml/min/1.73 sqM)  


 


Glucose     (74-99)  mg/dL


 


Calcium     (8.4-10.2)  mg/dL


 


Magnesium     (1.6-2.3)  mg/dL


 


Total Bilirubin     (0.2-1.3)  mg/dL


 


AST     (14-36)  U/L


 


ALT     (4-34)  U/L


 


Alkaline Phosphatase     ()  U/L


 


Ammonia   12   (<30)  umol/L


 


Troponin I    0.050 H*  (0.000-0.034)  ng/mL


 


NT-Pro-B Natriuret Pep  1870    pg/mL


 


Total Protein     (6.3-8.2)  g/dL


 


Albumin     (3.5-5.0)  g/dL


 


Lipase     ()  U/L














Disposition


Clinical Impression: 


 Abdominal pain, Pleural effusion, Ascites, Liver cirrhosis





Disposition: ADMITTED AS IP TO THIS \[facility]\""


Condition: Stable


Is patient prescribed a controlled substance at d/c from ED?: No


Decision to Admit Reason: Admit from EC


Decision Date: 03/10/20


Decision Time: 00:53

## 2020-03-10 NOTE — P.CRDCN
History of Present Illness


History of present illness: 





HISTORY OF PRESENTING ILLNESS


This is a pleasant 79-year-old  female past medical history significant

for liver cirrhosis, hypertension and diabetes mellitus.  She denies prior hist

ory of coronary artery disease and does not follow in the office with a 

cardiologist. We have been asked to see in consultation for chest pain.  Since 

November of last year she has been diagnosed with nonalcoholic liver cirrhosis 

and has undergone multiple therapeutic paracentesis.  Over the previous week she

has noticed an increase in abdominal distention associated with shortness of 

breath, chest pain, nausea and vomiting.  She underwent a paracentesis this 

morning with removal of 3.2 L of serous fluid.  In the past cytology has been 

obtained revealing liver as the source of her ascites with no malignancy.  Since

undergoing a paracentesis she states her symptoms have improved however are not 

completely gone.  She still feels a little discomfort in the chest and abdomen 

associated with some mild nausea.  She is seen and examined resting comfortably 

laying flat in bed with  at the bedside in no acute distress. 





DIAGNOSTICS


EKG reveals sinus mechanism heart rate of 90 with no acute ischemic changes.


Chest xray left pleural effusion and pleural diaphragmatic reaction unchanged 

from previous exam, no heart failure noted.


Laboratory reviewed, WBC 6.1, hemoglobin 11.2, platelets 129, sodium 132, 

potassium 5.1, creatinine 1.04, magnesium 1.6, troponin 0.050 and 0.038, NT 

proBNP 1870.


Current cardiac medications include Lasix 40 mg twice a day, lisinopril 5 mg 

daily and Aldactone 50 mg daily. 


Most recent echocardiogram in November 2019 revealed preserved LV systolic 

function with ejection fraction 55-60% with mild aortic stenosis with a mean 

gradient 14 mmHg.





REVIEW OF SYSTEMS


At the time of my exam:


CONSTITUTIONAL: Denies fever or chills.


CARDIOVASCULAR: Denies chest pain, shortness of breath, orthopnea, PND or 

palpitations.


RESPIRATORY: Denies cough. 


GASTROINTESTINAL: Denies abdominal pain, diarrhea, constipation, nausea or 

vomiting.


MUSCULOSKELETAL: Denies myalgias.


NEUROLOGIC: Denies numbness, tingling or weakness.


ENDOCRINE: Denies fatigue, weight change,  polydipsia or polyurina.


GENITOURINARY: Denies burning, hematuria or urgency with micturation.


HEMATOLOGIC: Denies history of anemia or bleeding. 





PHYSICAL EXAMINATION


Blood pressure 118/50 heart rate 93 afebrile and maintaining oxygen saturation 

on room air.


CONSTITUTIONAL: No apparent distress. 


HEENT: Head is normocephalic. Pupils are equal, round. Sclerae anicteric. Mucous

membranes of the mouth are moist.  No JVD. No carotid bruit.


CHEST EXAMINATION: Lungs are clear to auscultation. No chest wall tenderness is 

noted on palpation or with deep breathing. 


HEART EXAMINATION: Regular rate and rhythm. S1, S2 heard.  Systolic ejection 

murmur at the base, no gallops or rub.


ABDOMEN: Soft, nontender. Positive bowel sounds.


EXTREMITIES: 2+ peripheral pulses, no lower extremity edema and no calf 

tenderness.


NEUROLOGIC EXAMINATION: Patient is awake, alert and oriented x3. 





ASSESSMENT


Ascites secondary to liver cirrhosis


Nonalcoholic liver cirrhosis


Chest pain, atypical.  Seems to be related to ascites.  Has improved since 

paracentesis.


Elevated troponin of unclear etiology, not indicative of an acute coronary event

without typical rise and fall.


Hypertension


Diabetes mellitus


Thrombocytopenia


History of breast cancer status post lumpectomy





PLAN


Obtain third troponin. 


Repeat 2D echocardiogram and doppler study to assess cardiac structure and 

function. 


Troponin elevation of unclear etiology. 


Check lipid profile.


We will continue to follow and make recommendations accordingly.


Thank you kindly for this consultation.








Nurse Practitioner note has been reviewed, I agree with a documented findings 

and plan of care.  Patient was seen and examined.











Past Medical History


Past Medical History: Cancer, Diabetes Mellitus, Hypertension, Liver Disease


Additional Past Medical History / Comment(s): Pt recently admitted to [facility] on 

2/19/20 with symptomatic ascities/had paracentesis 5 L removed, thrombocytopenia

2ndary to cirrhosis/splenic sequestration and portal HTN.        Other:  

Nonalcoholic cirrhosis-thought d/t rx, ascities/paracentesis, L breast cancer 

with lumpectomy, NIDDM type II, neuropathy bilateral legs/feet,


History of Any Multi-Drug Resistant Organisms: None Reported


Past Surgical History: Breast Surgery, Cholecystectomy


Additional Past Surgical History / Comment(s): L breast lumpectomy for cancer, 

right breast lump removal-benign, paracentesis-several,


Past Anesthesia/Blood Transfusion Reactions: No Reported Reaction


Smoking Status: Never smoker





- Past Family History


  ** Father


Family Medical History: Cancer


Additional Family Medical History / Comment(s): Colon cancer





  ** Brother(s)


Family Medical History: Myocardial Infarction (MI)


Additional Family Medical History / Comment(s): One brother with stents, another

brother passed away from an MI





  ** Mother


Family Medical History: Neurologic Disorder


Additional Family Medical History / Comment(s): Parkinsons





Medications and Allergies


                                Home Medications











 Medication  Instructions  Recorded  Confirmed  Type


 


Cholecalciferol (Vitamin D3) 2,000 unit PO DAILY 11/09/19 03/10/20 History





[Vitamin D3]    


 


Pioglitazone [Actos] 30 mg PO DAILY 11/09/19 03/10/20 History


 


Sertraline [Zoloft] 50 mg PO DAILY 11/09/19 03/10/20 History


 


sitaGLIPtin PHOSPHATE [Januvia] 100 mg PO DAILY 11/09/19 03/10/20 History


 


Cyanocobalamin [Vitamin B-12] 1,000 mcg PO DAILY #60 tab 11/12/19 03/10/20 Rx


 


Furosemide [Lasix] 40 mg PO BID #30 tablet 02/20/20 03/10/20 Rx


 


Lisinopril [Zestril] 5 mg PO DAILY #0 02/20/20 03/10/20 Rx


 


Niacin 2,000 mg PO DAILY 03/10/20 03/10/20 History


 


Spironolactone 50 mg PO DAILY 03/10/20 03/10/20 History








                                    Allergies











Allergy/AdvReac Type Severity Reaction Status Date / Time


 


No Known Allergies Allergy   Verified 03/10/20 10:16














Physical Exam


Vitals: 


                                   Vital Signs











  Temp Pulse Pulse Resp BP BP Pulse Ox


 


 03/10/20 11:30   93   20  114/52   98


 


 03/10/20 11:00   98  96  16  113/45  113/47  99


 


 03/10/20 10:33    92  16   125/52  100


 


 03/10/20 10:30   98   20  102/60  


 


 03/10/20 10:10    100  16   102/60  99


 


 03/10/20 10:00   93   20  127/55  


 


 03/10/20 09:30   91   20  127/55  


 


 03/10/20 09:00   93   20  127/55   98


 


 03/10/20 08:31  98.2 F  90   16  127/55   98


 


 03/10/20 08:30   96   20  127/55  


 


 03/10/20 08:00   92   20  100/46   98


 


 03/10/20 07:30   93   20  100/46   98


 


 03/10/20 07:00   90    100/46   98


 


 03/10/20 06:30   92    100/46   97


 


 03/10/20 06:00   93    127/60   97


 


 03/10/20 05:30   88   17  127/60   98


 


 03/10/20 04:30   92   18  127/54   97


 


 03/10/20 03:00   85   18  132/55   98


 


 03/10/20 01:30   91   17  130/54   98


 


 03/10/20 00:30   87   18  145/61   97


 


 03/09/20 23:42  98.2 F  96   21  118/64   100








                                Intake and Output











 03/09/20 03/10/20 03/10/20





 22:59 06:59 14:59


 


Other:   


 


  Weight  83.325 kg 83.325 kg














Results





                                 03/11/20 08:51





                                 03/11/20 08:51


                                 Cardiac Enzymes











  03/09/20 03/09/20 03/10/20 Range/Units





  23:59 23:59 06:30 


 


AST  26    (14-36)  U/L


 


Troponin I   0.050 H*  0.038 H*  (0.000-0.034)  ng/mL








                                   Coagulation











  03/09/20 Range/Units





  23:59 


 


PT  12.1 H  (9.0-12.0)  sec


 


APTT  24.1  (22.0-30.0)  sec








                                       CBC











  03/09/20 Range/Units





  23:59 


 


WBC  6.1  (3.8-10.6)  k/uL


 


RBC  4.43  (3.80-5.40)  m/uL


 


Hgb  11.2 L  (11.4-16.0)  gm/dL


 


Hct  35.1  (34.0-46.0)  %


 


Plt Count  129 L  (150-450)  k/uL








                          Comprehensive Metabolic Panel











  03/09/20 Range/Units





  23:59 


 


Sodium  132 L  (137-145)  mmol/L


 


Potassium  5.1  (3.5-5.1)  mmol/L


 


Chloride  96 L  ()  mmol/L


 


Carbon Dioxide  27  (22-30)  mmol/L


 


BUN  33 H  (7-17)  mg/dL


 


Creatinine  1.04  (0.52-1.04)  mg/dL


 


Glucose  339 H  (74-99)  mg/dL


 


Calcium  9.3  (8.4-10.2)  mg/dL


 


AST  26  (14-36)  U/L


 


ALT  18  (4-34)  U/L


 


Alkaline Phosphatase  75  ()  U/L


 


Total Protein  6.2 L  (6.3-8.2)  g/dL


 


Albumin  3.3 L  (3.5-5.0)  g/dL








                               Current Medications











Generic Name Dose Route Start Last Admin





  Trade Name Freq  PRN Reason Stop Dose Admin


 


Furosemide  40 mg  03/10/20 09:00  03/10/20 08:26





  Lasix  PO   40 mg





  BID KHUSHI   Administration


 


Hydromorphone HCl  0.5 mg  03/10/20 00:47 





  Dilaudid  IVP  





  Q3HR PRN  





  Moderate Pain  


 


Linagliptin  5 mg  03/10/20 09:00  03/10/20 08:26





  Tradjenta  PO   5 mg





  DAILY KHUSHI   Administration


 


Lisinopril  5 mg  03/11/20 09:00 





  Zestril  PO  





  DAILY KHUSHI  


 


Naloxone HCl  0.2 mg  03/10/20 00:47 





  Narcan  IV  





  Q2M PRN  





  Opioid Reversal  


 


Ondansetron HCl  4 mg  03/10/20 00:47  03/10/20 05:11





  Zofran  IVP   4 mg





  Q8HR PRN   Administration





  Nausea And Vomiting  


 


Pantoprazole Sodium  40 mg  03/10/20 09:00  03/10/20 08:26





  Protonix  IV   40 mg





  DAILY KHUSHI   Administration


 


Spironolactone  50 mg  03/10/20 09:00  03/10/20 08:26





  Aldactone  PO   50 mg





  DAILY KHUSHI   Administration








                                Intake and Output











 03/09/20 03/10/20 03/10/20





 22:59 06:59 14:59


 


Other:   


 


  Weight  83.325 kg 83.325 kg








                                 Patient Weight











 03/11/20





 06:59


 


Weight 83.325 kg








                                        





                                 03/09/20 23:59 





                                 03/09/20 23:59

## 2020-03-10 NOTE — XR
EXAMINATION TYPE: XR chest 2V

 

DATE OF EXAM: 3/10/2020

 

COMPARISON: 2/19/2020

 

HISTORY: Chest pain

 

TECHNIQUE: 2 views

 

FINDINGS: There is some blunting left costophrenic angle. Heart size is normal. There are no hilar ma
sses. There are chest leads. Bony thorax is intact.

 

IMPRESSION: Left pleural effusion and pleural diaphragmatic reaction unchanged compared to last exam.
 No heart failure seen. Small area of atelectasis right middle lobe. Unchanged.

## 2020-03-10 NOTE — HP
HISTORY AND PHYSICAL



DATE OF SERVICE:

03/10/2020



CHIEF COMPLAINTS:

Chest and abdominal pain.



HISTORY OF PRESENT ILLNESS:

This 79-year-old woman who had nonalcoholic cirrhosis of liver,  history of diabetes,

hypertension, history of chronic liver disease, history of thrombocytopenia, recurrent

aspiration, being followed by Dr. King in the outpatient setting came to [facility] with complaints of abdominal distention, some chest pain.  The patient also

had some difficulty in breathing and the symptoms were worsening over the past several

days.  Patient had increasing ascites.  Patient was evaluated and was found to have

indeterminate troponin 0.05.  Subsequently 0.038 and subsequently came back to high

normal range at 0.029.  Blood sugar is elevated.  The patient underwent abdominal

paracentesis by Interventional Radiology.  About 3.2 L of serous fluid was removed.

The patient being closely monitored at this time.  There is no history of fever, rigors

or chills. No history of headache, loss of consciousness, seizures.  Cardiology has

seen the patient and recommended a 2D echo with Doppler and continue monitoring also.

Patient being closely monitored at this time. There is no history of headache,  loss of

consciousness, seizures at this time.



PAST MEDICAL HISTORY:

History of diabetes, hypertension, chronic liver disease, cirrhosis liver, splenic

sequestration, alcoholic hepatic steatosis, breast surgery and  depression.



MEDICATIONS:

Home medications are:

1. Niacin 2000 mg p.o. daily.

2. Aldactone 50 mg p.o. daily.

3. Vitamin D3 2000 daily.

4. Januvia 100 mg p.o. daily.

5. Zoloft 50 mg p.o. daily.

6. Actos 30 mg p.o. daily.

7. Zestril 5 mg p.o. daily.

8. Lasix 40 mg p.o. b.i.d.

9. B12 1000 mcg p.o. daily.



ALLERGIES:

None.



FAMILY HISTORY:

History of colon cancer in the family.



SOCIAL HISTORY:

No history of smoking. No history of alcohol intake.



REVIEW OF SYSTEMS:

ENT:  Diminished vision. Diminished hearing.

Cardiovascular system:  As mentioned earlier.

Respiration as mentioned earlier.

GASTROINTESTINAL: As mentioned earlier.

 mentioned earlier.

Nervous systems: No numbness or weakness.

Allergies/Immunology: No asthma or hayfever.

MUSCULOSKELETAL as mentioned earlier.

Hematology/Oncology:  No history of anemia.

Endocrine:  No history of diabetes or hypothyroidism.

CONSTITUTIONAL: As mentioned earlier.

DERMATOLOGY: Negative.

RHEUMATOLOGY: Negative.

PSYCHIATRY: As mentioned earlier.



PHYSICAL EXAMINATION:

The patient is alert, oriented x3.  Pulse is 93, blood pressure 114/52 respiration 20,

temperature 98.4, pulse ox 97% on room air.

HEENT: Conjunctivae normal. Oral mucosa moist.

NECK: No JVD.

CARDIOVASCULAR: S1, S2 muffled.

RESPIRATORY: Breath sounds diminished in the bases.  Scattered rhonchi and crackles.

ABDOMEN:  Soft, obese.  Ascites present.  Nontender.  No mass palpable.

LEGS:  No edema.  No swelling.

NERVOUS SYSTEM: Higher functions as mentioned earlier. Moves all four limbs. No focal

motor or sensory deficits.

SKIN: No ulcer, no rashes and no bleeding.

JOINTS:  No active deforming arthropathy.



LAB STUDIES:

Shows WBC 6.2, hemoglobin 11.2, platelets 129.  Sodium 132, troponin noted.



ASSESSMENT:

1. Ascites exacerbation secondary to non-alcoholic cirrhosis, status post abdominal

    paracentesis.

2. Chest discomfort with troponin 0.050.  Rule out type 2 myocardial infarction or

    coronary disease.

3. Anemia, microcytic.

4. Mild coagulopathy secondary to chronic liver disease.

5. Hyponatremia.

6. Diabetes mellitus type 2 uncontrolled with hyperglycemia.

7. Obesity.

8. History of hypertension.

9. History of chronic liver disease.

10.History of thrombocytopenia.

11.History of left breast cancer with lumpectomy.

12.History of peripheral neuropathy.

13.History of breast surgery.

14.History of cholecystectomy.

15.History of depression.

16.FULL CODE.



RECOMMENDATIONS AND DISCUSSION:

In this 79-year-old woman who presented with multiple complex medical issues.  We will

monitor the patient closely.  Continue the current medications, management and

symptomatic treatment.  Ascitic aspiration has been done.  We will initiate diuretics

closely.  Otherwise cardiology is going to evaluate the elevated troponin.  Two-D echo

has been ordered.  Prognosis guarded because of multiple complex medical issues.

Further recommendations to follow.  A copy of this dictation being forwarded to Dr. King who is the primary physician.





MMODL / IJN: [id number] / Job#: [id number]

## 2020-03-10 NOTE — US
EXAMINATION TYPE: US paracentesis abd w/image

 

DATE OF EXAM: 3/10/2020

 

COMPARISON: NONE

 

HISTORY: Ascites.

 

PROCEDURE:

Maximal barrier technique was utilized.  The skin overlying a suitable pocket of fluid was localized 
with ultrasound and the overlying skin was prepped and draped.  Ultrasound was utilized with sterile 
technique. Lidocaine was used for local anesthesia and a skin nick made with a scalpel. Catheter was 
advanced under direct ultrasound guidance into a suitable pocket of fluid and approximately 3.2 liter
s of serous fluid were removed.  Catheter was withdrawn and hemostasis achieved.  There is no immedia
te complication; the patient is discharged in stable condition. 

 

IMPRESSION:  STATUS POST ULTRASOUND GUIDED PARACENTESIS FOR PALLIATION OF ASCITES.  THIS PROCEDURE WA
S PERFORMED BY THE UNDERSIGNED.

## 2020-03-11 LAB
ANION GAP SERPL CALC-SCNC: 11 MMOL/L
BASOPHILS # BLD AUTO: 0 K/UL (ref 0–0.2)
BASOPHILS NFR BLD AUTO: 0 %
BUN SERPL-SCNC: 39 MG/DL (ref 7–17)
CALCIUM SPEC-MCNC: 9.4 MG/DL (ref 8.4–10.2)
CHLORIDE SERPL-SCNC: 98 MMOL/L (ref 98–107)
CHOLEST SERPL-MCNC: 172 MG/DL (ref ?–200)
CO2 SERPL-SCNC: 26 MMOL/L (ref 22–30)
EOSINOPHIL # BLD AUTO: 0 K/UL (ref 0–0.7)
EOSINOPHIL NFR BLD AUTO: 1 %
ERYTHROCYTE [DISTWIDTH] IN BLOOD BY AUTOMATED COUNT: 4.88 M/UL (ref 3.8–5.4)
ERYTHROCYTE [DISTWIDTH] IN BLOOD: 16.3 % (ref 11.5–15.5)
GLUCOSE BLD-MCNC: 184 MG/DL (ref 75–99)
GLUCOSE BLD-MCNC: 227 MG/DL (ref 75–99)
GLUCOSE BLD-MCNC: 228 MG/DL (ref 75–99)
GLUCOSE BLD-MCNC: 308 MG/DL (ref 75–99)
GLUCOSE SERPL-MCNC: 214 MG/DL (ref 74–99)
HCT VFR BLD AUTO: 40 % (ref 34–46)
HDLC SERPL-MCNC: 43 MG/DL (ref 40–60)
HGB BLD-MCNC: 12 GM/DL (ref 11.4–16)
HYALINE CASTS UR QL AUTO: 265 /LPF (ref 0–2)
LDLC SERPL CALC-MCNC: 105 MG/DL (ref 0–99)
LYMPHOCYTES # SPEC AUTO: 1 K/UL (ref 1–4.8)
LYMPHOCYTES NFR SPEC AUTO: 19 %
MCH RBC QN AUTO: 24.6 PG (ref 25–35)
MCHC RBC AUTO-ENTMCNC: 30 G/DL (ref 31–37)
MCV RBC AUTO: 81.9 FL (ref 80–100)
MONOCYTES # BLD AUTO: 0.4 K/UL (ref 0–1)
MONOCYTES NFR BLD AUTO: 9 %
NEUTROPHILS # BLD AUTO: 3.3 K/UL (ref 1.3–7.7)
NEUTROPHILS NFR BLD AUTO: 68 %
PH UR: 5 [PH] (ref 5–8)
PLATELET # BLD AUTO: 114 K/UL (ref 150–450)
POTASSIUM SERPL-SCNC: 5.1 MMOL/L (ref 3.5–5.1)
RBC UR QL: 4 /HPF (ref 0–5)
SODIUM SERPL-SCNC: 135 MMOL/L (ref 137–145)
SP GR UR: 1.01 (ref 1–1.03)
SQUAMOUS UR QL AUTO: 5 /HPF (ref 0–4)
TRIGL SERPL-MCNC: 122 MG/DL (ref ?–150)
UROBILINOGEN UR QL STRIP: <2 MG/DL (ref ?–2)
WBC # BLD AUTO: 4.9 K/UL (ref 3.8–10.6)
WBC #/AREA URNS HPF: 31 /HPF (ref 0–5)

## 2020-03-11 RX ADMIN — PIOGLITAZONE SCH MG: 30 TABLET ORAL at 08:40

## 2020-03-11 RX ADMIN — INSULIN ASPART SCH UNIT: 100 INJECTION, SOLUTION INTRAVENOUS; SUBCUTANEOUS at 20:23

## 2020-03-11 RX ADMIN — SPIRONOLACTONE SCH MG: 25 TABLET, FILM COATED ORAL at 08:40

## 2020-03-11 RX ADMIN — GABAPENTIN SCH MG: 100 CAPSULE ORAL at 08:40

## 2020-03-11 RX ADMIN — CYANOCOBALAMIN TAB 500 MCG SCH MCG: 500 TAB at 08:40

## 2020-03-11 RX ADMIN — PANTOPRAZOLE SODIUM SCH MG: 40 INJECTION, POWDER, FOR SOLUTION INTRAVENOUS at 08:40

## 2020-03-11 RX ADMIN — LINAGLIPTIN SCH MG: 5 TABLET, FILM COATED ORAL at 08:40

## 2020-03-11 RX ADMIN — INSULIN ASPART SCH UNIT: 100 INJECTION, SOLUTION INTRAVENOUS; SUBCUTANEOUS at 13:23

## 2020-03-11 RX ADMIN — INSULIN ASPART SCH UNIT: 100 INJECTION, SOLUTION INTRAVENOUS; SUBCUTANEOUS at 08:40

## 2020-03-11 RX ADMIN — SERTRALINE HYDROCHLORIDE SCH MG: 50 TABLET, FILM COATED ORAL at 08:42

## 2020-03-11 RX ADMIN — METHYLENE BLUE SCH MLS/HR: 10 INJECTION INTRAVENOUS at 12:41

## 2020-03-11 RX ADMIN — Medication SCH UNIT: at 08:40

## 2020-03-11 RX ADMIN — METHYLENE BLUE SCH: 10 INJECTION INTRAVENOUS at 13:22

## 2020-03-11 RX ADMIN — Medication SCH MG: at 08:39

## 2020-03-11 RX ADMIN — INSULIN ASPART SCH UNIT: 100 INJECTION, SOLUTION INTRAVENOUS; SUBCUTANEOUS at 17:46

## 2020-03-11 RX ADMIN — FUROSEMIDE SCH MG: 40 TABLET ORAL at 08:40

## 2020-03-11 RX ADMIN — GABAPENTIN SCH MG: 100 CAPSULE ORAL at 20:54

## 2020-03-11 RX ADMIN — FUROSEMIDE SCH: 40 TABLET ORAL at 17:44

## 2020-03-11 RX ADMIN — GABAPENTIN SCH MG: 100 CAPSULE ORAL at 16:21

## 2020-03-11 NOTE — EST
EXERCISE STRESS



AGE:

79



SEX:

F



HT:

5'6"



WT:

183



PROTOCOL:

Persantine Cardiolite Stress Test



HEART RATE REST:

89



BLOOD PRESSURE REST:

135/57



MAXIMUM HEART RATE ACHIEVED:

89



MAXIMUM BLOOD PRESSURE:

135/57



85% MPHR:

122



100% MPHR:

143



INDICATIONS:

Chest pain.



CLINICAL INFORMATION:

Baseline EKG shows sinus rhythm, normal axis, normal intervals.  Patient was given

intravenous Persantine as per protocol without chest pain or diagnostic ST-segment

depression.



CONCLUSION:

1. Negative stress test by EKG criteria.

2. Cardiolite portion of the stress test will be reported separately.





MMODL / IJN: [id number] / Job#: [id number]

## 2020-03-11 NOTE — PN
PROGRESS NOTE



DATE OF SERVICE:

03/11/2020



This 79-year-old woman who was admitted with chest pain and abdominal pain, had

significant ascitic aspiration.  Cardiology following the patient closely.  
Cardiology

performed a Lexiscan stress test which showed  induced left myocardial ischemia.

Cardiology planning a stress test.  Otherwise, lab wise, the patient is being 
closely

monitored.  Hemoglobin is 12 and creatinine is 1.43.



PAST MEDICAL HISTORY:

Reviewed.



REVIEW OF SYSTEMS:

Cardiovascular: As mentioned earlier. Respiratory: As mentioned earlier.  GI as

mentioned earlier.  no dysuria. Nervous system: No numbness or weakness.



CURRENT MEDICATIONS:

Medications are reviewed and include:

1. Tylenol p.r.n.

2. Xanax 0.25 t.i.d.

3. Aminophylline 100 mg IV once.

4. Aspirin 320 mg once daily.

5. Aspirin 81 mg.

6. Caffeine.

7. Ceftin 1 g daily.

8. Vitamin B12 1000 mcg p.o.

9. Lasix 40 mg p.o. b.i.d.

10.Neurontin 100 mg p.o. t.i.d.

11.Dilaudid p.r.n.

12.Tradjenta.

13.Narcan.

14.Protonix.

15.Actos.

16.Zoloft.

17.Doses reviewed.



PHYSICAL EXAMINATION:

Alert and oriented x3.  Pulse is 79.  Blood pressure 91/52, respirations 20,

temperature normal, pulse ox 99% on room air.

HEENT: Conjunctivae normal.

NECK: No JVD.

CARDIOVASCULAR: S1, S2 muffled.

RESPIRATION: Breath sounds diminished in the bases.  Bilateral scattered rhonchi
and

crackles.

ABDOMEN:  Soft, obese, nontender.

LEGS are no edema. No swelling.

CENTRAL NERVOUS SYSTEM: No focal deficits.



LABS:

WBC 4.9, hemoglobin is 12, and platelets are 114.  Sodium 135.

UA noted.



ASSESSMENT:

1. Ascites exacerbation secondary to known alcoholic cirrhosis, status post 
abdominal

    paracentesis and distention.

2. Chest pain with troponin 0.050, possibly acute non-ST segment elevation 
myocardial

    infarction with abnormal stress test for cardiac cath tomorrow.

3. Relative hypotension multifactorial.

4. Acute urinary tract infection, present on admission.

5. Anemia, microcytic.

6. Mild coagulopathy secondary to chronic liver disease, present on admission.

7. Hyponatremia.

8. Diabetes type 2 uncontrolled with hyperglycemia.

9. Obesity.

10.History of hypertension.

11.History of chronic liver disease.

12.History of thrombocytopenia.

13.History of left breast cancer with lumpectomy.

14.History of peripheral neuropathy.

15.History of breast surgery.

16.History of cholecystectomy.

17.History of depression.

18.FULL CODE.



RECOMMENDATIONS AND DISCUSSION:

Continue current medications, management and symptomatic treatment.  Otherwise, 
I would

recommend fluid bolus of 250.9 and adjust medications.  Discussed with the 
patient.

See orders for details.  Otherwise, follow closely with Cardiology.  Possible 
cardiac

catheterization tomorrow.  Monitor creatinine closely.  Otherwise, prognosis 
guarded

because of multiple complex medical issues.  Further recommendations to follow. 
See

orders for details. Continue with antibiotics for UTI.  Cultures are pending.





MMODL / IJN: [id number] / Job#: [id number]

MTDD

## 2020-03-11 NOTE — P.PN
Subjective





HISTORY OF PRESENTING ILLNESS


This is a pleasant 79-year-old  female past medical history significant

for liver cirrhosis, hypertension and diabetes mellitus.  She denies prior 

history of coronary artery disease and does not follow in the office with a 

cardiologist. She came to the hospital with symptoms of chest pain and recurrent

ascites s/p thoracentesis. She had elevated troponins as well prompting us to 

order a stress test. Lexiscan stress test performed today revealed 

pharmocologically induced LV myocardial ischemia along the lateral wall 

extending to the anterior lateral and inferolateral apical regions. Blood 

pressure running on the low side at 88/47 heart rate 79 maintaining oxygen 

saturation on room air. Laboratory data reviewed, , HDL 43, creatinine 

1.43 with GFR 35, sodium 135, potassium 5.1. 





PHYSICAL EXAMINATION


CONSTITUTIONAL: No apparent distress. 


HEENT: Head is normocephalic. Pupils are equal, round. Sclerae anicteric. Mucous

membranes of the mouth are moist.  No JVD. No carotid bruit.


CHEST EXAMINATION: Lungs are clear to auscultation. No chest wall tenderness is 

noted on palpation or with deep breathing. 


HEART EXAMINATION: Regular rate and rhythm. S1, S2 heard.  Systolic ejection 

murmur at the base, no gallops or rub.


EXTREMITIES: 2+ peripheral pulses, no lower extremity edema and no calf 

tenderness.





ASSESSMENT


Ascites secondary to liver cirrhosis


Nonalcoholic liver cirrhosis


Chest pain, atypical.  Seems to be related to ascites.  Has improved since 

paracentesis.


Elevated troponin of unclear etiology, not indicative of an acute coronary 

event. Lexiscan stress test abnormal.


Hypertension


Diabetes mellitus


Thrombocytopenia


History of breast cancer status post lumpectomy





PLAN


Recommend proceeding with coronary angiography to assess for coronary artery 

disease. I have discussed the risks, benefits and alternative therapies for the 

above-mentioned procedure and for both sedation/analgesia as well as necessary 

blood product administration, if indicated, as they pertain to this patient.  

The patient has indicated understanding and acceptance of the risks and 

procedures discussed. Questions have been answered appropriately. We will repeat

the creatinine tomorrow morning. 





Nurse Practitioner note has been reviewed, I agree with a documented findings 

and plan of care.  Patient was seen and examined.





Objective





- Vital Signs


Vital signs: 


                                   Vital Signs











Temp  97.3 F L  03/11/20 12:20


 


Pulse  79   03/11/20 12:28


 


Resp  16   03/11/20 12:20


 


BP  88/47   03/11/20 12:28


 


Pulse Ox  99   03/11/20 12:28








                                 Intake & Output











 03/10/20 03/11/20 03/11/20





 18:59 06:59 18:59


 


Intake Total  590 


 


Balance  590 


 


Weight 83.325 kg  83.32 kg


 


Intake:   


 


  Oral  590 


 


Other:   


 


  # Voids  4 


 


  # Bowel Movements  0 














- Labs


CBC & Chem 7: 


                                 03/11/20 08:51





                                 03/11/20 08:51


Labs: 


                  Abnormal Lab Results - Last 24 Hours (Table)











  03/10/20 03/11/20 03/11/20 Range/Units





  20:47 03:59 07:17 


 


MCH     (25.0-35.0)  pg


 


MCHC     (31.0-37.0)  g/dL


 


RDW     (11.5-15.5)  %


 


Plt Count     (150-450)  k/uL


 


Sodium     (137-145)  mmol/L


 


BUN     (7-17)  mg/dL


 


Creatinine     (0.52-1.04)  mg/dL


 


Glucose     (74-99)  mg/dL


 


POC Glucose (mg/dL)  304 H   227 H  (75-99)  mg/dL


 


LDL Cholesterol, Calc     (0-99)  mg/dL


 


Urine Appearance   Cloudy H   (Clear)  


 


Urine Blood   Small H   (Negative)  


 


Ur Leukocyte Esterase   Large H   (Negative)  


 


Urine WBC   31 H   (0-5)  /hpf


 


Ur Squamous Epith Cells   5 H   (0-4)  /hpf


 


Urine Bacteria   Moderate H   (None)  /hpf


 


Hyaline Casts   265 H   (0-2)  /lpf


 


Urine Mucus   Rare H   (None)  /hpf














  03/11/20 03/11/20 03/11/20 Range/Units





  08:51 08:51 11:30 


 


MCH   24.6 L   (25.0-35.0)  pg


 


MCHC   30.0 L   (31.0-37.0)  g/dL


 


RDW   16.3 H   (11.5-15.5)  %


 


Plt Count   114 L   (150-450)  k/uL


 


Sodium  135 L    (137-145)  mmol/L


 


BUN  39 H    (7-17)  mg/dL


 


Creatinine  1.43 H    (0.52-1.04)  mg/dL


 


Glucose  214 H    (74-99)  mg/dL


 


POC Glucose (mg/dL)    184 H  (75-99)  mg/dL


 


LDL Cholesterol, Calc  105 H    (0-99)  mg/dL


 


Urine Appearance     (Clear)  


 


Urine Blood     (Negative)  


 


Ur Leukocyte Esterase     (Negative)  


 


Urine WBC     (0-5)  /hpf


 


Ur Squamous Epith Cells     (0-4)  /hpf


 


Urine Bacteria     (None)  /hpf


 


Hyaline Casts     (0-2)  /lpf


 


Urine Mucus     (None)  /hpf

## 2020-03-11 NOTE — NM
EXAMINATION TYPE: NM stress lexiscan cardiolite

 

DATE OF EXAM: 3/11/2020

 

COMPARISON: NONE

 

HISTORY: Elevated troponin, chest pain

 

TECHNIQUE:  After the intravenous administration of 9.8 mCi Tc 99m Sestamibi - Cardiolite resting SPE
CT images acquired 55 minutes post injection. 

 

The patient received 0.4mg Lexiscan, 26 mCi Tc 99m Sestamibi - Stress images obtained 40 minutes post
 injection 

 

FINDINGS:  

 

Review of stress and rest SPECT images demonstrates decreased reaffirms of uptake along the lateral w
all left ventricle extending to the anterior lateral and inferolateral and apical regions on stress a
s compared to rest images, lateral wall uptake somewhat reduced even on rest images.  Gated analysis 
shows normal wall motion with an estimated left ventricular ejection fraction of 69 %.

 

 

 

IMPRESSION:  

 

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
e present along the lateral wall of the left ventricle, associated carmencita-infarct ischemia. Consider ec
hocardiographic correlation for elevated ejection fraction.

## 2020-03-11 NOTE — ECHOF
Referral Reason:cp, elev trop



MEASUREMENTS

--------

HEIGHT: 165.1 cm

WEIGHT: 83.0 kg

BP: 113/67

RVIDd:   3.8 cm     (< 3.3)

IVSd:   1.2 cm     (0.6 - 1.1)

LVIDd:   4.4 cm     (3.9 - 5.3)

LVPWd:   1.3 cm     (0.6 - 1.1)

IVSs:   1.6 cm

LVIDs:   2.9 cm

LVPWs:   1.5 cm

LA Diam:   4.0 cm     (2.7 - 3.8)

LAESV Index (A-L):   29.30 ml/m

Ao Diam:   2.7 cm     (2.0 - 3.7)

AV Cusp:   1.7 cm     (1.5 - 2.6)

LA Diam:   4.1 cm     (2.7 - 3.8)

MV EXCURSION:   15.618 mm     (> 18.000)

MV EF SLOPE:   37 mm/s     (70 - 150)

EPSS:   0.6 cm

MV E Henry:   0.94 m/s

MV DecT:   234 ms

MV A Henry:   1.49 m/s

MV E/A Ratio:   0.63 

AV maxP.10 mmHg

AV meanP.61 mmHg

RAP:   5.00 mmHg

RVSP:   43.53 mmHg







FINDINGS

--------

Sinus rhythm.

This was a technically adequate study.

The left ventricular size is normal.   There is mild concentric left ventricular hypertrophy.   Overa
ll left ventricular systolic function is normal with, an EF between 55 - 60 %.   The diastolic fillin
g pattern is normal for the age of the patient 18.79.

The right ventricle is normal in size.

The left atrial size is normal.   LA is midly dilated 29-33ml/m2.

The right atrial size is normal.

There is mild aortic stenosis present.   Peak/mean gradient across the Aortic Valve is 24.10mmHg / 11
.61mmHg.

Mild mitral annular calcification present.   Mild mitral regurgitation is present.

Mild tricuspid regurgitation present.   There is mild pulmonary hypertension.   The right ventricular
 systolic pressure, as measured by Doppler, is 43.53mmHg.

There is no pulmonic regurgitation present.

The aortic root size is normal.

There is no pericardial effusion.



CONCLUSIONS

--------

1. Sinus rhythm.

2. This was a technically adequate study.

3. The left ventricular size is normal.

4. There is mild concentric left ventricular hypertrophy.

5. Overall left ventricular systolic function is normal with, an EF between 55 - 60 %.

6. The diastolic filling pattern is normal for the age of the patient 18.79

7. The right ventricle is normal in size.

8. The left atrial size is normal.

9. LA is midly dilated 29-33ml/m2.

10. The right atrial size is normal.

11. There is mild aortic stenosis present.

12. Peak/mean gradient across the Aortic Valve is 24.10mmHg / 11.61mmHg.

13. Mild mitral annular calcification present.

14. Mild mitral regurgitation is present.

15. Mild tricuspid regurgitation present.

16. There is mild pulmonary hypertension.

17. The right ventricular systolic pressure, as measured by Doppler, is 43.53mmHg.

18. There is no pulmonic regurgitation present.

19. The aortic root size is normal.

20. There is no pericardial effusion.





SONOGRAPHER: Megan Ortega RDCS

## 2020-03-12 LAB
ANION GAP SERPL CALC-SCNC: 6 MMOL/L
APTT BLD: 24.8 SEC (ref 22–30)
BASOPHILS # BLD AUTO: 0 K/UL (ref 0–0.2)
BASOPHILS NFR BLD AUTO: 0 %
BUN SERPL-SCNC: 44 MG/DL (ref 7–17)
CALCIUM SPEC-MCNC: 8 MG/DL (ref 8.4–10.2)
CHLORIDE SERPL-SCNC: 103 MMOL/L (ref 98–107)
CO2 SERPL-SCNC: 24 MMOL/L (ref 22–30)
EOSINOPHIL # BLD AUTO: 0.1 K/UL (ref 0–0.7)
EOSINOPHIL NFR BLD AUTO: 2 %
ERYTHROCYTE [DISTWIDTH] IN BLOOD BY AUTOMATED COUNT: 4.13 M/UL (ref 3.8–5.4)
ERYTHROCYTE [DISTWIDTH] IN BLOOD: 15.9 % (ref 11.5–15.5)
GLUCOSE BLD-MCNC: 185 MG/DL (ref 75–99)
GLUCOSE BLD-MCNC: 185 MG/DL (ref 75–99)
GLUCOSE BLD-MCNC: 201 MG/DL (ref 75–99)
GLUCOSE BLD-MCNC: 203 MG/DL (ref 75–99)
GLUCOSE BLD-MCNC: 205 MG/DL (ref 75–99)
GLUCOSE BLD-MCNC: 316 MG/DL (ref 75–99)
GLUCOSE SERPL-MCNC: 184 MG/DL (ref 74–99)
HCT VFR BLD AUTO: 33.3 % (ref 34–46)
HGB BLD-MCNC: 10.4 GM/DL (ref 11.4–16)
INR PPP: 1.3 (ref ?–1.2)
LYMPHOCYTES # SPEC AUTO: 0.8 K/UL (ref 1–4.8)
LYMPHOCYTES NFR SPEC AUTO: 18 %
MAGNESIUM SPEC-SCNC: 1.6 MG/DL (ref 1.6–2.3)
MCH RBC QN AUTO: 25.2 PG (ref 25–35)
MCHC RBC AUTO-ENTMCNC: 31.3 G/DL (ref 31–37)
MCV RBC AUTO: 80.6 FL (ref 80–100)
MONOCYTES # BLD AUTO: 0.3 K/UL (ref 0–1)
MONOCYTES NFR BLD AUTO: 7 %
NEUTROPHILS # BLD AUTO: 3 K/UL (ref 1.3–7.7)
NEUTROPHILS NFR BLD AUTO: 71 %
PH UR: 5 [PH] (ref 5–8)
PLATELET # BLD AUTO: 84 K/UL (ref 150–450)
POTASSIUM SERPL-SCNC: 4.7 MMOL/L (ref 3.5–5.1)
PT BLD: 12.7 SEC (ref 9–12)
SODIUM SERPL-SCNC: 133 MMOL/L (ref 137–145)
SP GR UR: 1.01 (ref 1–1.03)
UROBILINOGEN UR QL STRIP: <2 MG/DL (ref ?–2)
WBC # BLD AUTO: 4.2 K/UL (ref 3.8–10.6)

## 2020-03-12 RX ADMIN — Medication SCH UNIT: at 07:59

## 2020-03-12 RX ADMIN — METOPROLOL TARTRATE SCH: 25 TABLET, FILM COATED ORAL at 17:48

## 2020-03-12 RX ADMIN — GABAPENTIN SCH MG: 100 CAPSULE ORAL at 07:59

## 2020-03-12 RX ADMIN — MAGNESIUM SULFATE IN DEXTROSE SCH MLS/HR: 10 INJECTION, SOLUTION INTRAVENOUS at 14:59

## 2020-03-12 RX ADMIN — NOREPINEPHRINE BITARTRATE SCH MLS/HR: 1 INJECTION, SOLUTION, CONCENTRATE INTRAVENOUS at 23:45

## 2020-03-12 RX ADMIN — CEFAZOLIN SCH MLS/HR: 330 INJECTION, POWDER, FOR SOLUTION INTRAMUSCULAR; INTRAVENOUS at 12:25

## 2020-03-12 RX ADMIN — MAGNESIUM SULFATE IN DEXTROSE SCH MLS/HR: 10 INJECTION, SOLUTION INTRAVENOUS at 16:27

## 2020-03-12 RX ADMIN — Medication SCH MG: at 07:59

## 2020-03-12 RX ADMIN — CYANOCOBALAMIN TAB 500 MCG SCH MCG: 500 TAB at 07:58

## 2020-03-12 RX ADMIN — INSULIN ASPART SCH UNIT: 100 INJECTION, SOLUTION INTRAVENOUS; SUBCUTANEOUS at 12:24

## 2020-03-12 RX ADMIN — INSULIN ASPART SCH UNIT: 100 INJECTION, SOLUTION INTRAVENOUS; SUBCUTANEOUS at 21:42

## 2020-03-12 RX ADMIN — GABAPENTIN SCH MG: 100 CAPSULE ORAL at 15:01

## 2020-03-12 RX ADMIN — METOPROLOL TARTRATE SCH MG: 25 TABLET, FILM COATED ORAL at 21:43

## 2020-03-12 RX ADMIN — CEFAZOLIN SCH MLS/HR: 330 INJECTION, POWDER, FOR SOLUTION INTRAMUSCULAR; INTRAVENOUS at 23:46

## 2020-03-12 RX ADMIN — SPIRONOLACTONE SCH: 25 TABLET, FILM COATED ORAL at 07:59

## 2020-03-12 RX ADMIN — PIOGLITAZONE SCH: 30 TABLET ORAL at 00:05

## 2020-03-12 RX ADMIN — INSULIN ASPART SCH UNIT: 100 INJECTION, SOLUTION INTRAVENOUS; SUBCUTANEOUS at 17:52

## 2020-03-12 RX ADMIN — FUROSEMIDE SCH: 40 TABLET ORAL at 07:59

## 2020-03-12 RX ADMIN — HEPARIN SODIUM SCH MLS/HR: 10000 INJECTION, SOLUTION INTRAVENOUS at 13:00

## 2020-03-12 RX ADMIN — PANTOPRAZOLE SODIUM SCH MG: 40 INJECTION, POWDER, FOR SOLUTION INTRAVENOUS at 08:00

## 2020-03-12 RX ADMIN — INSULIN ASPART SCH: 100 INJECTION, SOLUTION INTRAVENOUS; SUBCUTANEOUS at 00:04

## 2020-03-12 RX ADMIN — LINAGLIPTIN SCH: 5 TABLET, FILM COATED ORAL at 00:04

## 2020-03-12 RX ADMIN — NOREPINEPHRINE BITARTRATE SCH MLS/HR: 1 INJECTION, SOLUTION, CONCENTRATE INTRAVENOUS at 13:02

## 2020-03-12 RX ADMIN — GABAPENTIN SCH MG: 100 CAPSULE ORAL at 21:43

## 2020-03-12 RX ADMIN — SERTRALINE HYDROCHLORIDE SCH MG: 50 TABLET, FILM COATED ORAL at 08:00

## 2020-03-12 NOTE — XR
EXAMINATION TYPE: XR chest 1V portable

 

DATE OF EXAM: 3/12/2020

 

COMPARISON: 3/10/2020

 

HISTORY: Shortness of breath

 

TECHNIQUE: Single frontal view of the chest is obtained.

 

FINDINGS:  There is an interstitial pattern with bilateral consolidation and small effusion. Postsurg
ical changes left axilla. Arthropathy of the shoulder. No sizable pneumothorax.

 

IMPRESSION:  

1. I lateral infiltrate and small effusion. Correlate for mild venous congestion versus pneumonia.

## 2020-03-12 NOTE — P.PN
Subjective





HISTORY OF PRESENTING ILLNESS


This is a pleasant 79-year-old  female past medical history significant

for liver cirrhosis, hypertension and diabetes mellitus.  She denies prior 

history of coronary artery disease and does not follow in the office with a 

cardiologist. She came to the hospital with symptoms of chest pain and recurrent

ascites s/p thoracentesis. This morning the nurse noted her heart rate and blood

pressure were abnormal, 72/44 heart rate 136. EKG requested revealed atrial 

fibrillation with rapid ventricular rate. She is seen and examined laying flat 

resting comfortably in bed. She states she can feel her heart racing with some 

associated discomfort in the chest. She denies shortness of breath, dizziness, 

nausea, vomiting or diaphoresis. Laboratory data reviewed, WBC 4.2, hgb 10.4, 

plt 84, creatinine 1.85. Currently maintained on aspirin 81 m gdaily, lasix 40 

mg daily and aldactone 50 mg daily. 





PHYSICAL EXAMINATION


CONSTITUTIONAL: No apparent distress. Generalized pallor.


HEENT: Head is normocephalic. Pupils are equal, round. Sclerae anicteric. Mucous

membranes of the mouth are moist.  No JVD. No carotid bruit.


CHEST EXAMINATION: Lungs are clear to auscultation. No chest wall tenderness is 

noted on palpation or with deep breathing. 


HEART EXAMINATION: Irregular rate and rhythm, tachycardic. S1, S2 heard.  

Systolic ejection murmur at the base, no gallops or rub.


EXTREMITIES: Faint peripheral pulses, no lower extremity edema and no calf 

tenderness.





ASSESSMENT


New onset paroxysmal atrial fibrillation with rapid ventricular response


Acute kidney injury


Ascites secondary to liver cirrhosis


Nonalcoholic liver cirrhosis


Chest pain, atypical.  Seems to be related to ascites.  Has improved since 

paracentesis.


Elevated troponin of unclear etiology, not indicative of an acute coronary 

event. Lexiscan stress test abnormal.


Hypertension


Diabetes mellitus


Thrombocytopenia


History of breast cancer status post lumpectomy





PLAN


Transfer the patient to higher level of care. 


Give lopressor 50 mg PO now for heart rate, will hold on IV infusions at this 

point for low blood pressures. 


Check PT/INR/PTT. 


Hold on heparin infusion currently until clotting studies come back and 

evaluation by GI for dropping platelets and history of liver disease. 


Cardiac catheterization will be cancelled at this time pending clinical course. 


Further recommendations to follow based on clinical course. 





Nurse Practitioner note has been reviewed, I agree with a documented findings 

and plan of care.  Patient was seen and examined.





Objective





- Vital Signs


Vital signs: 


                                   Vital Signs











Temp  97.7 F   03/12/20 05:00


 


Pulse  136 H  03/12/20 07:51


 


Resp  18   03/12/20 05:00


 


BP  72/44   03/12/20 07:51


 


Pulse Ox  98   03/12/20 05:00








                                 Intake & Output











 03/11/20 03/12/20 03/12/20





 18:59 06:59 18:59


 


Intake Total 50 666.56 


 


Balance 50 666.56 


 


Weight 83.32 kg  


 


Intake:   


 


  Intake, IV Titration 50 666.56 





  Amount   


 


    Sodium Chloride 0.9% 1,  666.56 





    000 ml In Empty Bag 1 bag   





    @ 1 ML/KG/HR 83.32 mls/   





    hr IV .Q12H1M ONE Rx#:   





    [id number]   


 


    cefTRIAXone 1 gm In 50  





    Sodium Chloride 0.9% 50   





    ml @ 100 mls/hr IVPB   





    Q24HR KHUSHI Rx#:[id number]   


 


Other:   


 


  # Voids  2 


 


  # Bowel Movements  1 














- Labs


CBC & Chem 7: 


                                 03/12/20 07:31





                                 03/12/20 07:31


Labs: 


                  Abnormal Lab Results - Last 24 Hours (Table)











  03/11/20 03/11/20 03/11/20 Range/Units





  08:51 08:51 11:30 


 


Hgb     (11.4-16.0)  gm/dL


 


Hct     (34.0-46.0)  %


 


MCH   24.6 L   (25.0-35.0)  pg


 


MCHC   30.0 L   (31.0-37.0)  g/dL


 


RDW   16.3 H   (11.5-15.5)  %


 


Plt Count   114 L   (150-450)  k/uL


 


Lymphocytes #     (1.0-4.8)  k/uL


 


Sodium  135 L    (137-145)  mmol/L


 


BUN  39 H    (7-17)  mg/dL


 


Creatinine  1.43 H    (0.52-1.04)  mg/dL


 


Glucose  214 H    (74-99)  mg/dL


 


POC Glucose (mg/dL)    184 H  (75-99)  mg/dL


 


LDL Cholesterol, Calc  105 H    (0-99)  mg/dL














  03/11/20 03/11/20 03/12/20 Range/Units





  17:08 19:57 07:21 


 


Hgb     (11.4-16.0)  gm/dL


 


Hct     (34.0-46.0)  %


 


MCH     (25.0-35.0)  pg


 


MCHC     (31.0-37.0)  g/dL


 


RDW     (11.5-15.5)  %


 


Plt Count     (150-450)  k/uL


 


Lymphocytes #     (1.0-4.8)  k/uL


 


Sodium     (137-145)  mmol/L


 


BUN     (7-17)  mg/dL


 


Creatinine     (0.52-1.04)  mg/dL


 


Glucose     (74-99)  mg/dL


 


POC Glucose (mg/dL)  228 H  308 H  205 H  (75-99)  mg/dL


 


LDL Cholesterol, Calc     (0-99)  mg/dL














  03/12/20 03/12/20 03/12/20 Range/Units





  07:31 07:31 08:35 


 


Hgb  10.4 L    (11.4-16.0)  gm/dL


 


Hct  33.3 L    (34.0-46.0)  %


 


MCH     (25.0-35.0)  pg


 


MCHC     (31.0-37.0)  g/dL


 


RDW  15.9 H    (11.5-15.5)  %


 


Plt Count  84 L    (150-450)  k/uL


 


Lymphocytes #  0.8 L    (1.0-4.8)  k/uL


 


Sodium     (137-145)  mmol/L


 


BUN     (7-17)  mg/dL


 


Creatinine   1.85 H   (0.52-1.04)  mg/dL


 


Glucose     (74-99)  mg/dL


 


POC Glucose (mg/dL)    185 H  (75-99)  mg/dL


 


LDL Cholesterol, Calc     (0-99)  mg/dL














  03/12/20 Range/Units





  09:01 


 


Hgb   (11.4-16.0)  gm/dL


 


Hct   (34.0-46.0)  %


 


MCH   (25.0-35.0)  pg


 


MCHC   (31.0-37.0)  g/dL


 


RDW   (11.5-15.5)  %


 


Plt Count   (150-450)  k/uL


 


Lymphocytes #   (1.0-4.8)  k/uL


 


Sodium   (137-145)  mmol/L


 


BUN   (7-17)  mg/dL


 


Creatinine   (0.52-1.04)  mg/dL


 


Glucose   (74-99)  mg/dL


 


POC Glucose (mg/dL)  203 H  (75-99)  mg/dL


 


LDL Cholesterol, Calc   (0-99)  mg/dL

## 2020-03-12 NOTE — PN
PROGRESS NOTE



DATE OF SERVICE:

03/12/2020



This 79-year-old woman who was admitted with significant ascites also had chest 
pain.

The patient had an abnormal stress test. The patient was slated to have cardiac

catheterization today, but the patient apparently developed palpitations and 
atrial

fibrillation with a fast ventricular rate, probably related to hypotension. The 
patient

was transferred to ICU after fluid boluses.  The patient was started on Levophed
drip

at this time.  The troponin level was found to be 0.050 and 0.029. Past medical 
history

reviewed.



REVIEW OF SYSTEMS:

CARDIOVASCULAR SYSTEM: As mentioned earlier.

RESPIRATORY SYSTEM: As mentioned earlier.

GI: As mentioned earlier.

: No dysuria or retention.

NERVOUS SYSTEM: No numbness, weakness.



CURRENT MEDICATIONS:

Reviewed. They include:

1. Tylenol p.r.n.

2. Xanax 0.25 t.i.d. p.r.n.

3. Aspirin 81 mg daily.

4. Rocephin 1 gram daily.

5. Vitamin D3.

6. Vitamin B12 1000 mcg p.o. daily.

7. Lasix 40 mg p.o. daily.

8. Neurontin 100 mg p.o. t.i.d.

9. Heparin b.i.d.

10.Dilaudid 0.5 mg q.6 p.r.n.

11.Narcan.

12.Niacin.

13.Nitrostat.

14.Levophed drip.

15.Zofran.

16.Protonix.

17.Actos.

18.Aldactone.

19.Restoril.

Doses are reviewed.



PHYSICAL EXAMINATION:

Patient is alert, oriented x3.  Pulse is 118, irregular, blood pressure 107/85,

respiration 12, temperature normal, pulse ox 97% on room air.

HEENT: Conjunctivae normal.

NECK: No jugular venous distention.

CARDIOVASCULAR SYSTEM:  S1, S2 irregular.

RESPIRATORY SYSTEM: Breath sounds diminished at the bases.  A few scattered 
rhonchi. No

crackles.

ABDOMEN: Soft, obese, non-tender.

LEGS: No edema. No swelling. O

NERVOUS SYSTEM: Diffusely weak.



LABS:

Labs at this time show WBC 4.2, hemoglobin 10.4. sodium 133.



Chest x-ray is not available.



ASSESSMENT:

1. Ascites exacerbation secondary to non-alcoholic cirrhosis, status post 
abdominal

    paracentesis and distention.

2. Chest pain with troponin 0.050, possibly acute non-ST-segment-elevation 
myocardial

    infarction, with abnormal stress test.

3. Relative hypotension, multifactorial. Hypotension is possibly cardiogenic 
shock.

4. Atrial fibrillation with a fast ventricular rate.

5. Acute urinary tract infection, present on admission.

6. Anemia, microcytic.

7. Chronic coagulopathy secondary to chronic liver disease, present on 
admission.

8. Hyponatremia.

9. Diabetes mellitus, type 2, uncontrolled, with hyperglycemia.

10.Obesity.

11.Hypertension.

12.Chronic liver disease.

13.Thrombocytopenia.

14.History of left breast cancer with lumpectomy.

15.History of peripheral neuropathy.

16.History of breast surgery.

17.History of cholecystectomy.

18.History of depression.

19.FULL CODE.



RECOMMENDATIONS AND DISCUSSION:

In this 79-year-old woman who presented with multiple medical issues we will 
continue

to monitor, continue with IV fluids, continue with empiric antibiotics, continue
with

heparin.  Closely follow with Cardiology.  Guarded prognosis.  Further 
recommendations

to follow. We will also consult Dr. Real for ICU management as well.  
Guarded

prognosis.  See orders for further details. Gastroenterology also has been 
consulted.





MMODL / IJN: [id number] / Job#: [id number]

[id number]

## 2020-03-13 LAB
ANION GAP SERPL CALC-SCNC: 8 MMOL/L
BASOPHILS # BLD AUTO: 0 K/UL (ref 0–0.2)
BASOPHILS NFR BLD AUTO: 0 %
BUN SERPL-SCNC: 48 MG/DL (ref 7–17)
CALCIUM SPEC-MCNC: 8 MG/DL (ref 8.4–10.2)
CHLORIDE SERPL-SCNC: 102 MMOL/L (ref 98–107)
CO2 SERPL-SCNC: 19 MMOL/L (ref 22–30)
EOSINOPHIL # BLD AUTO: 0.2 K/UL (ref 0–0.7)
EOSINOPHIL NFR BLD AUTO: 2 %
ERYTHROCYTE [DISTWIDTH] IN BLOOD BY AUTOMATED COUNT: 3.87 M/UL (ref 3.8–5.4)
ERYTHROCYTE [DISTWIDTH] IN BLOOD: 16.5 % (ref 11.5–15.5)
GLUCOSE BLD-MCNC: 213 MG/DL (ref 75–99)
GLUCOSE BLD-MCNC: 221 MG/DL (ref 75–99)
GLUCOSE BLD-MCNC: 237 MG/DL (ref 75–99)
GLUCOSE BLD-MCNC: 257 MG/DL (ref 75–99)
GLUCOSE SERPL-MCNC: 201 MG/DL (ref 74–99)
HCT VFR BLD AUTO: 31.7 % (ref 34–46)
HGB BLD-MCNC: 9.9 GM/DL (ref 11.4–16)
LYMPHOCYTES # SPEC AUTO: 1.5 K/UL (ref 1–4.8)
LYMPHOCYTES NFR SPEC AUTO: 15 %
MAGNESIUM SPEC-SCNC: 2 MG/DL (ref 1.6–2.3)
MCH RBC QN AUTO: 25.6 PG (ref 25–35)
MCHC RBC AUTO-ENTMCNC: 31.2 G/DL (ref 31–37)
MCV RBC AUTO: 82 FL (ref 80–100)
MONOCYTES # BLD AUTO: 1 K/UL (ref 0–1)
MONOCYTES NFR BLD AUTO: 10 %
NEUTROPHILS # BLD AUTO: 7.4 K/UL (ref 1.3–7.7)
NEUTROPHILS NFR BLD AUTO: 71 %
PLATELET # BLD AUTO: 181 K/UL (ref 150–450)
POTASSIUM SERPL-SCNC: 5.1 MMOL/L (ref 3.5–5.1)
SODIUM SERPL-SCNC: 129 MMOL/L (ref 137–145)
WBC # BLD AUTO: 10.4 K/UL (ref 3.8–10.6)

## 2020-03-13 PROCEDURE — 4A133J1 MONITORING OF ARTERIAL PULSE, PERIPHERAL, PERCUTANEOUS APPROACH: ICD-10-PCS

## 2020-03-13 PROCEDURE — 4A133B1 MONITORING OF ARTERIAL PRESSURE, PERIPHERAL, PERCUTANEOUS APPROACH: ICD-10-PCS

## 2020-03-13 PROCEDURE — 0W9G3ZZ DRAINAGE OF PERITONEAL CAVITY, PERCUTANEOUS APPROACH: ICD-10-PCS

## 2020-03-13 PROCEDURE — 03HY32Z INSERTION OF MONITORING DEVICE INTO UPPER ARTERY, PERCUTANEOUS APPROACH: ICD-10-PCS

## 2020-03-13 RX ADMIN — CEFAZOLIN SCH MLS/HR: 330 INJECTION, POWDER, FOR SOLUTION INTRAMUSCULAR; INTRAVENOUS at 12:31

## 2020-03-13 RX ADMIN — SERTRALINE HYDROCHLORIDE SCH MG: 50 TABLET, FILM COATED ORAL at 09:29

## 2020-03-13 RX ADMIN — Medication SCH MG: at 09:28

## 2020-03-13 RX ADMIN — GABAPENTIN SCH MG: 100 CAPSULE ORAL at 21:11

## 2020-03-13 RX ADMIN — METOPROLOL TARTRATE SCH MG: 25 TABLET, FILM COATED ORAL at 09:28

## 2020-03-13 RX ADMIN — INSULIN ASPART SCH UNIT: 100 INJECTION, SOLUTION INTRAVENOUS; SUBCUTANEOUS at 17:17

## 2020-03-13 RX ADMIN — SPIRONOLACTONE SCH MG: 25 TABLET, FILM COATED ORAL at 10:39

## 2020-03-13 RX ADMIN — CYANOCOBALAMIN TAB 500 MCG SCH MCG: 500 TAB at 09:27

## 2020-03-13 RX ADMIN — INSULIN ASPART SCH UNIT: 100 INJECTION, SOLUTION INTRAVENOUS; SUBCUTANEOUS at 07:28

## 2020-03-13 RX ADMIN — NOREPINEPHRINE BITARTRATE SCH MLS/HR: 1 INJECTION, SOLUTION, CONCENTRATE INTRAVENOUS at 09:15

## 2020-03-13 RX ADMIN — ASPIRIN 81 MG CHEWABLE TABLET SCH MG: 81 TABLET CHEWABLE at 09:27

## 2020-03-13 RX ADMIN — NOREPINEPHRINE BITARTRATE SCH MLS/HR: 1 INJECTION, SOLUTION, CONCENTRATE INTRAVENOUS at 05:00

## 2020-03-13 RX ADMIN — NOREPINEPHRINE BITARTRATE SCH MLS/HR: 1 INJECTION, SOLUTION, CONCENTRATE INTRAVENOUS at 19:04

## 2020-03-13 RX ADMIN — PANTOPRAZOLE SODIUM SCH MG: 40 INJECTION, POWDER, FOR SOLUTION INTRAVENOUS at 09:28

## 2020-03-13 RX ADMIN — HEPARIN SODIUM SCH MLS/HR: 10000 INJECTION, SOLUTION INTRAVENOUS at 21:06

## 2020-03-13 RX ADMIN — METOPROLOL TARTRATE SCH MG: 25 TABLET, FILM COATED ORAL at 21:12

## 2020-03-13 RX ADMIN — PIOGLITAZONE SCH MG: 30 TABLET ORAL at 10:39

## 2020-03-13 RX ADMIN — Medication SCH UNIT: at 09:27

## 2020-03-13 RX ADMIN — INSULIN ASPART SCH UNIT: 100 INJECTION, SOLUTION INTRAVENOUS; SUBCUTANEOUS at 21:09

## 2020-03-13 RX ADMIN — HEPARIN SODIUM SCH MLS/HR: 10000 INJECTION, SOLUTION INTRAVENOUS at 12:31

## 2020-03-13 RX ADMIN — GABAPENTIN SCH MG: 100 CAPSULE ORAL at 09:27

## 2020-03-13 RX ADMIN — INSULIN ASPART SCH UNIT: 100 INJECTION, SOLUTION INTRAVENOUS; SUBCUTANEOUS at 12:31

## 2020-03-13 RX ADMIN — GABAPENTIN SCH MG: 100 CAPSULE ORAL at 15:26

## 2020-03-13 RX ADMIN — INSULIN ASPART SCH UNIT: 100 INJECTION, SOLUTION INTRAVENOUS; SUBCUTANEOUS at 17:18

## 2020-03-13 NOTE — CONS
CONSULTATION



DATE OF DICTATION:

03/12/2020



REASON FOR CONSULTATION:

Anemia and history of liver cirrhosis.



HISTORY OF PRESENT ILLNESS:

The patient is a 79-year-old pleasant white female with history of nonalcoholic liver

cirrhosis diagnosed 6 months ago, longstanding history of diabetes mellitus and

hypertension was admitted to the hospital because of abdominal distention and ascites

as well as shortness of breath. She underwent large volume paracentesis 2 days ago and

approximately 3.2 L of fluid was removed. In the meantime while in the hospital, she

had slightly elevated troponin and hence she underwent a cardiac evaluation and had a

stress test that showed positive for reversible ischemia and hence she is going for a

cardiac catheterization tomorrow.  In the meantime, she developed A Fib with rapid RVR

and became hypotensive transferred to the intensive care unit.  We are consulted to

evaluate and recommendation regarding starting her on IV heparin.



In the meantime, the patient denies any GI symptoms.  She reports no abdominal pain.

She denies any nausea, vomiting.  No rectal bleeding.  No melena. No prior history of

EGD or colonoscopy in the past.  Currently, she remains hemodynamically stable.



PAST MEDICAL HISTORY:

Past medical history is significant for longstanding history of diabetes mellitus,

hypertension, liver cirrhosis diagnosed 6 months ago with ascites, status post large

volume paracentesis 3 days ago, neuropathy, history of breast cancer.



PAST SURGICAL HISTORY:

Breast surgery, cholecystectomy, and paracentesis.



MEDICATIONS:

Medications at home include vitamin D3, Actos, Zoloft, Januvia, vitamin B12, Lasix,

Zestril, Niacin, spironolactone.



ALLERGIES:

None.



SOCIAL HISTORY:

No smoking.  No alcohol use.



FAMILY HISTORY:

Unremarkable.



REVIEW OF SYSTEMS:

CARDIOPULMONARY: Complains of some shortness of breath, but no chest pain.

GENITOURINARY: No dysuria or hematuria.

MUSCULOSKELETAL: Unremarkable.

SKIN: Unremarkable.

ENDOCRINE: Unremarkable.

PSYCHIATRIC:  History of anxiety.

NEUROLOGY: Unremarkable.

ENT/VISION: Unremarkable.

CONSTITUTIONAL:  No recent weight loss.  No fever, chills, night sweats.



PHYSICAL EXAMINATION:

On physical examination, she appears comfortable in no apparent distress.

Vital signs are stable. Blood pressure is a 107/85, pulse rate 63 and afebrile.

HEENT: Examination unremarkable. Conjunctivae pink. Sclerae anicteric. Oral cavity, no

lesions.

NECK: No JVD or lymph node enlargement.

CHEST: Clear to auscultation.

HEART:  Regular rate and rhythm.

ABDOMEN:  Soft.  There was no free fluid noted.  The liver and spleen were not

palpable.

EXTREMITIES: No pedal edema.

SKIN: No rashes.

NEURO: She is awake, alert, oriented x3.  No focal deficits.



LABS:

Labs done today, WBC 4.2, hemoglobin 10.4, platelets are 84,000.  At the time of

admission to the hospital, hemoglobin was 11.2.  INR is 1.3.  AST and ALT 26 and 18

respectively. T-bilirubin and alkaline phosphatase are normal.  Troponin was 0.050.



IMPRESSION:

1. Non-alcoholic cirrhosis of the liver diagnosed a few months ago when she presented

    with new onset ascites. She is status post large volume paracentesis 3 days ago and

    doing much better. She has decompensated liver disease with preserved

    _____function.

2. Mild anemia, but clinically no evidence of active gastrointestinal bleed, most

    likely anemia is explained with _____ of chronic underlying liver disease.

3. Atrial fibrillation with rapid ventricular response and hypotension. Patient

    presently in the ICU being monitored closely.

4. Mild elevation of troponin with reversible cardiac ischemia on recent stress test.

    Cardiology is evaluating the patient and for possible cardiac catheterization

    tomorrow.

5. Diabetes mellitus.

6. Hypertension.



RECOMMENDATION:

1. Since there is no evidence of active bleeding, there is no contraindication to

    start her on anticoagulation with IV heparin.

2. Monitor CBC on a close basis.

3. If she has any evidence of active bleeding, will consider an endoscopy intervention

    at that time. For now, we will follow her closely and monitor her labs on a daily

    basis.

Thank you for this consultation.





MMODL / IJN: [id number] / Job#: [id number]

## 2020-03-13 NOTE — PCN
PROCEDURE NOTE



PREOPERATIVE DIAGNOSIS:

Hypovolemic, hypotension.



POSTOPERATIVE DIAGNOSIS:

Hypovolemic, hypotension.



LEFT RADIAL ARTERIAL LINE PLACEMENT:

Indications:  Hemodynamic monitoring.



A time-out was completed verifying correct patient, procedure, site, positioning, and

implant(s) or special equipment if applicable.



Chandra's test was performed to ensure adequate perfusion.  The patient's left wrist was

prepped and draped in sterile fashion.  1% Lidocaine was used to anesthetize the area.

An 18G Arrow arterial line was introduced into the left radial artery.  The catheter

was threaded over the guide wire and the needle was removed with appropriate pulsatile

blood return.  Blood loss was minimal.  The catheter was then sutured in place to the

skin and a sterile dressing applied.  Perfusion to the extremity distal to the point of

catheter insertion was checked and found to be adequate.



The patient tolerated the procedure well.  The patient was awake for the procedure.

The area was numbed with 1% lidocaine.  The radial line was placed without any

difficulty.  Good waveform was noted.  Line was sutured in place, flushed, sterile

dressing was applied.





MMODL / IJN: [id number] / Job#: [id number]

## 2020-03-13 NOTE — P.PN
Subjective


Progress Note Date: 03/13/20


Principal diagnosis: 





Hypotension, A. fib RVR





This is a 79-year-old female patient with known history of nonalcoholic liver 

cirrhosis.  The patient also has history of diabetes and hypertension.  She has 

had previous paracentesis on several occasions for abdominal distention and 

ascites.  The patient came into the hospital and the patient was noted to have 

increased abdominal distention associated with some shortness of breath.  She 

also had some limited chest pain.  No nausea or vomiting.  She underwent a 

paracentesis for a total of 3.2 L of ascitic fluid was removed and it was not 

sent for any cultures.  The patient subsequently improved and she was doing 

well.  Her troponins were positive at 0.5.  The patient was given a cardiac 

stress test that came back positive for reversible ischemia and based on that 

and based on the positive stress test, the patient was being planned for a 

cardiac catheterization.  Earlier this morning the patient went into atrial 

fibrillation with RVR.  Her blood pressure dropped and she got transferred to 

the intensive care unit.  Upon arrival her systolic blood pressure was in the 

60s.  The patient was started on IV fluids bolus and she was given 1.5 L.  

Subsequently she was given 2.5 mg of IV Lopressor.  This afternoon she converted

back to normal sinus rhythm.  She is currently in normal sinus rhythm with a 

rate of 61.  She is on IV heparin.  She is doing well without any chest pain.  

She is on norepinephrine infusion running at 0.09 g per KG per minute.  Her 

most recent blood pressure 95/44.  Note that her creatinine came up to 1.8 and 

the patient is producing urine output in the order of 30 mL an hour.  No 

abdominal distention.  Chest x-ray shows some cardiomegaly.  Echo of the heart 

showed EF of around 55-60%.  Resume cardiac structures were all within normal 

limits.  The patient had mild aortic stenosis.  The right ventricular systolic 

pressure was 43.





The patient is seen today 03/13/202 in the intensive care unit. She is currently

awake and alert in no acute distress.  She remains hypotensive however.  She  is

on norepinephrine at 0.16 mcg/kg/m.  0.9 normal saline at 75 ML's per hour.  

Heparin drip per weight base protocol. Currently in sinus rhythm.  Maintaining 

O2 saturations in the 90s on 2 L/m per nasal cannula. White count 10.4.  

Hemoglobin 9.9.  Platelet count 181,000.  Sodium 129.  Potassium 5.1.  Bicarb 

19.  Creatinine 2.03.  TSH 1.12.





Objective





- Vital Signs


Vital signs: 


                                   Vital Signs











Temp  99.1 F   03/13/20 08:00


 


Pulse  67   03/13/20 10:00


 


Resp  18   03/13/20 10:00


 


BP  79/44   03/13/20 09:45


 


Pulse Ox  92 L  03/13/20 10:00








                                 Intake & Output











 03/12/20 03/13/20 03/13/20





 18:59 06:59 18:59


 


Intake Total 2514.627 1662.773 601.896


 


Output Total 400 355 255


 


Balance 2114.627 1307.773 346.896


 


Weight  84.4 kg 


 


Intake:   


 


  IV 1745 900 340


 


    Magnesium Sulfate-D5w Pmx 200  





    1 gm In Dextrose/Water 1   





    100ml.bag @ 100 mls/hr   





    IVPB Q1H [facility] Rx#:   





    [id number]   


 


    Sodium Chloride 0.9% 1, 545 900 290





    000 ml @ 75 mls/hr IV .   





    [id number] [facility] Rx#:[id number]   


 


    Sodium Chloride 0.9% 1, 1000  





    000 ml @ 999 mls/hr IV .   





    Q1H1M ONE Rx#:[id number]   


 


    cefTRIAXone 1 gm In   50





    Sodium Chloride 0.9% 50   





    ml @ 100 mls/hr IVPB   





    Q24HR [facility] Rx#:[id number]   


 


  Intake, IV Titration 49.627 512.773 261.896





  Amount   


 


    Heparin Sod,Pork in 0.45%  67.986 





    NaCl 25,000 unit In 0.45   





    % NaCl 1 250ml.bag @ 12   





    UNITS/KG/HR 9.998 mls/hr   





    IV .Q24H [facility] Rx#:   





    [id number]   


 


    Norepinephrine 4 mg In 49.627 444.787 261.896





    Sodium Chloride 0.9% 250   





    ml @ 0.05 MCG/KG/MIN 15.   





    872 mls/hr IV .Q16H1M [facility]   





    Rx#:[id number]   


 


  Oral 720 250 


 


Output:   


 


  Urine 400 355 255


 


Other:   


 


  Voiding Method Indwelling Catheter Indwelling Catheter Indwelling Catheter








                       ABP, PAP, CO, CI - Last Documented











Arterial Blood Pressure        89/36

















- Exam





GENERAL EXAM: Alert, pleasant 79 yold female, on 2 L nasal cannula, comfortable 

in no apparent distress.


HEAD: Normocephalic.


EYES: Normal reaction of pupils, equal size.


NOSE: Clear with pink turbinates.


THROAT: No erythema or exudates.


NECK: No masses, no JVD.


CHEST: No chest wall deformity.


LUNGS: Equal air entry with crackles in the bases.


CVS: S1 and S2 normal with no audible murmur, regular rhythm.


ABDOMEN: Soft, normal bowel sounds, no guarding or rigidity.


SPINE: No scoliosis or deformity


SKIN: No rashes


CENTRAL NERVOUS SYSTEM: No focal deficits, tone is normal in all 4 extremities.


EXTREMITIES: There is no peripheral edema.  No clubbing, no cyanosis.  

Peripheral pulses are intact.





- Labs


CBC & Chem 7: 


                                 03/13/20 04:55





                                 03/13/20 04:55


Labs: 


                  Abnormal Lab Results - Last 24 Hours (Table)











  03/12/20 03/12/20 03/12/20 Range/Units





  10:20 10:20 12:22 


 


Hgb     (11.4-16.0)  gm/dL


 


Hct     (34.0-46.0)  %


 


RDW     (11.5-15.5)  %


 


PT  12.7 H    (9.0-12.0)  sec


 


INR  1.3 H    (<1.2)  


 


APTT     (22.0-30.0)  sec


 


Sodium   133 L   (137-145)  mmol/L


 


Carbon Dioxide     (22-30)  mmol/L


 


BUN   44 H   (7-17)  mg/dL


 


Creatinine   1.86 H   (0.52-1.04)  mg/dL


 


Glucose   184 H   (74-99)  mg/dL


 


POC Glucose (mg/dL)    185 H  (75-99)  mg/dL


 


Calcium   8.0 L   (8.4-10.2)  mg/dL














  03/12/20 03/12/20 03/12/20 Range/Units





  17:24 19:00 21:35 


 


Hgb     (11.4-16.0)  gm/dL


 


Hct     (34.0-46.0)  %


 


RDW     (11.5-15.5)  %


 


PT     (9.0-12.0)  sec


 


INR     (<1.2)  


 


APTT   164.2 H*   (22.0-30.0)  sec


 


Sodium     (137-145)  mmol/L


 


Carbon Dioxide     (22-30)  mmol/L


 


BUN     (7-17)  mg/dL


 


Creatinine     (0.52-1.04)  mg/dL


 


Glucose     (74-99)  mg/dL


 


POC Glucose (mg/dL)  316 H   201 H  (75-99)  mg/dL


 


Calcium     (8.4-10.2)  mg/dL














  03/13/20 03/13/20 03/13/20 Range/Units





  04:55 04:55 04:55 


 


Hgb  9.9 L    (11.4-16.0)  gm/dL


 


Hct  31.7 L    (34.0-46.0)  %


 


RDW  16.5 H    (11.5-15.5)  %


 


PT     (9.0-12.0)  sec


 


INR     (<1.2)  


 


APTT    59.4 H  (22.0-30.0)  sec


 


Sodium   129 L   (137-145)  mmol/L


 


Carbon Dioxide   19 L   (22-30)  mmol/L


 


BUN   48 H   (7-17)  mg/dL


 


Creatinine   2.03 H   (0.52-1.04)  mg/dL


 


Glucose   201 H   (74-99)  mg/dL


 


POC Glucose (mg/dL)     (75-99)  mg/dL


 


Calcium   8.0 L   (8.4-10.2)  mg/dL














  03/13/20 Range/Units





  07:00 


 


Hgb   (11.4-16.0)  gm/dL


 


Hct   (34.0-46.0)  %


 


RDW   (11.5-15.5)  %


 


PT   (9.0-12.0)  sec


 


INR   (<1.2)  


 


APTT   (22.0-30.0)  sec


 


Sodium   (137-145)  mmol/L


 


Carbon Dioxide   (22-30)  mmol/L


 


BUN   (7-17)  mg/dL


 


Creatinine   (0.52-1.04)  mg/dL


 


Glucose   (74-99)  mg/dL


 


POC Glucose (mg/dL)  221 H  (75-99)  mg/dL


 


Calcium   (8.4-10.2)  mg/dL














Assessment and Plan


Assessment: 





1 acute hypotension accompanied by A. fib RVR, currently converted back to 

normal sinus rhythm and the patient's blood pressure remains low and the 

patient's pressor dependent on norepinephrine infusion at 0.16 g per KG per 

minute.  She does have abnormal stress test with induced left ventricular 

myocardial ischemia involving the lateral wall.  Consider underlying coronary 

artery disease.


2 nonalcoholic liver cirrhosis and the patient is status post large volume 

paracentesis last paracentesis being done 03/10/2020


3 paroxysmal atrial fibrillation currently in normal sinus rhythm and the 

patient is on IV heparin 


4 positive troponins.  An underlying positive stress test about underlying 

coronary artery disease


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 

coagulopathy


6 diabetes mellitus type 2


7 peripheral neuropathy


8 history of breast cancer with a previous lumpectomy on the left


9 mild thrombocytopenia


10 acute kidney injury, probably related to hypotension/large volume 

paracentesis.  Consider prerenal azotemia.  The patient is nonoliguric at this 

point in time.





Plan





The patient was seen and evaluated by Dr. Real.


DC Lasix and aldactone


Give albumin x 1


Continue 0.9 saline at 75 mls/hr


Continue heparin drip for now


Continue to monitor blood pressure


Continue ICU





I, the cosigning physician, performed a history & physical examination of the 

patient. Lungs sounds with crackles in the bases.  Maintaining good O2 

saturations in the 90s on 2L/min per nasal canula.  I discussed the assessment 

and plan of care with my nurse practitioner, Michelle Marcial. I attest to the above 

note as dictated by her.

## 2020-03-13 NOTE — PN
PROGRESS NOTE



DATE OF SERVICE:

03/13/2020



This 79-year-old woman who was admitted with ascites, acute exacerbation, also had

abdominal paracentesis. Subsequently patient developed relative hypotension

multifactorial with cardiogenic shock.  The patient also had atrial fibrillation with

fast ventricular rate.  The patient is still on a small dose of Levophed.  The patient

being closely monitored by Cardiology at this time. Gastroenterology also following the

patient closely.  The patient was given IV Lopressor also.



Past medical history reviewed.



The current sodium is 129, hemoglobin is 9.9.



PAST MEDICAL HISTORY:

Reviewed.



REVIEW OF SYSTEMS:

Cardiovascular system is as mentioned earlier.

RESPIRATORY: As mentioned earlier.

GI: As mentioned earlier.

 no dysuria.

NERVOUS SYSTEM: No numbness or weakness.



CURRENT MEDICATIONS:

Reviewed and include:

1. Tylenol p.r.n.

2. Xanax 0.5 t.i.d.

3. Aspirin 81 mg.

4. Rocephin 1 g daily.

5. Folic acid.

6. Vitamin B1.

7. Neurontin.

8. Heparin.

9. Dilaudid 0.5 mg q.3 p.r.n.

10.NovoLog scale.

11.Levemir.

12.Lopressor 25 mg p.o. b.i.d.

13.Narcan.

14.Nitrostat.

15.Actos.

16.Aldactone.

17.Restoril.



PHYSICAL EXAMINATION:

On physical exam,  the patient alert and oriented times three.

Pulse 69 and regular, blood pressure 88/33, respiration 20.  Temperature normal.  Pulse

ox 94% on room air.

HEENT:  Conjunctivae normal.

NECK: No JVD.

CARDIOVASCULAR: S1, S2 muffled.

RESPIRATIONS: Breath sounds diminished in the bases.  A few scattered rhonchi and

crackles.

ABDOMEN:  Soft, minimal ascites.

LEGS no edema. No swelling.

CENTRAL NERVOUS SYSTEM: Higher functions as mentioned earlier. Moves all 4 limbs.  No

focal motor or sensory deficits.

LYMPHATICS: No lymph nodes palpable in the neck, axillae or groin.

SKIN: No ulcer, rashes or bleeding.

JOINTS: No active deforming arthropathy.



LAB:

Labs are at this time WBC 10.3,  hemoglobin 10.9, APTT noted.  Creatinine is 2.03.  The

baseline was normal.



ASSESSMENT:

1. Ascites, acute exacerbation with secondary to nonalcoholic cirrhosis, status post

    abdominal paracentesis and distention.

2. Chest pain with troponin 0.050 possible acute non-ST-segment-elevation myocardial

    infarction with abnormal stress test.

3. Relative hypotension multifactorial hypotension, possible cardiogenic shock.

4. Atrial fibrillation with fast ventricular rate.

5. Acute renal failure with acute tubular necrosis and prerenal factors.

6. Acute urinary tract infection present on admission.

7. Anemia, microcytic of undetermined etiology.

8. Chronic coagulopathy secondary to chronic liver disease, present on admission.

9. Hyponatremia, possibly SIADH.

10.Diabetes mellitus type 2, uncontrolled with hyperglycemia.

11.Obesity.

12.Hypertension.

13.Chronic liver disease.

14.Thrombocytopenia.

15.History of left breast cancer with lumpectomy.

16.History of peripheral neuropathy.

17.History of breast surgery.

18.History of cholecystectomy.

19.History of depression.

20.FULL CODE.



RECOMMENDATIONS AND DISCUSSION:

In this 79-year-old woman who presented with multiple complex medical issues, we will

monitor the patient closely, continue the current medications, management and

symptomatic treatment.  The patient has multiple medical issues as mentioned earlier.

The cardiac rhythm is normal sinus rhythm but still the patient is hypotensive and the

most recent chest x-rays not available. I will repeat a chest x-ray tomorrow morning

and follow with multiple consultants.  Avoid nephrotoxic medications.  Albumin has been

given.  The patient is on IV heparin as well.  2D echo with Doppler this admission was

reviewed which showed ejection fraction about 50-60 percent and multiple mild valvular

abnormalities also.  Once again, the prognosis guarded because of multiple complex

medical issues. Closely follow with Cardiology and multiple other consultants and

continue to monitor.  Further recommendations to follow.





MMODL / IJN: [id number] / Job#: [id number]

## 2020-03-13 NOTE — PN
PROGRESS NOTE



DATE OF DICTATION:

03/13/2020



This patient is a 79-year-old pleasant white female with _____ liver cirrhosis and

portal hypertension with ascites who underwent large-volume paracentesis 4 few days

ago. She was transferred to the intensive care unit because of hypotension and atrial

fibrillation with RVR.  She is on Levophed for continued hypotension.  She denies any

symptoms.  She reports no abdominal pain, reports no nausea, vomiting. Denies any

rectal bleeding or melena.



PHYSICAL EXAMINATION:

She appears comfortable.  No apparent distress.

VITAL SIGNS:  Stable. Blood pressure is 79/44, pulse rate 67, temperature 99.1.

HEENT examination unremarkable. Conjunctivae pink. Sclerae anicteric. Oral cavity no

lesions.

NECK: No JVD or lymph node enlargement.

CHEST: Clear to auscultation.

HEART:  Regular rate and rhythm.

ABDOMEN:  Soft.  There was no fluid noted. No shifting dullness seen.

EXTREMITIES: No pedal edema.

SKIN: No rashes.

NEUROLOGIC:  She is alert and oriented x3.  No focal deficits.



LABS:

Labs from today show WBC 10.4, hemoglobin 9.9, platelets 181,000. BUN 48, creatinine

2.03. Sodium 129, potassium 5.1, CO2 102 and chloride 19.



IMPRESSION:

1. Cirrhosis of the liver secondary to nonalcoholic liver disease with portal

    hypertension and gradual decompensation.

2. Ascites, status post paracentesis 3 weeks ago.

3. Hypertension secondary to atrial fibrillation and rapid ventricular response.

    Remains on Levophed.

4. History of diabetes mellitus.

5. Elevated BUN and creatinine secondary to acute kidney injury.



RECOMMENDATIONS:

1. Continue management as per ICU.

2. Agree with holding off on diuretics for now.

3. Monitor labs on a daily basis.

4. Will follow with you closely.

Thank you for this consultation.





MMODL / IJN: [id number] / Job#: [id number]

## 2020-03-13 NOTE — PN
PROGRESS NOTE



Mrs. Samaniego is a 79-year-old female who has recurrent ascites, has been undergoing

repeat paracentesis, was admitted after the paracentesis because of symptoms of chest

discomfort and had mild troponin elevation.  She had a stress test that was abnormal

and was scheduled to undergo a cardiac catheterization when she had evidence of atrial

fibrillation with rapid ventricular response and hypotension.  The patient has a

history of non-alcoholic liver cirrhosis.  She is back in sinus mechanism at this time.

She denies any symptoms of chest discomfort.  She denies any dizziness or palpitations.

She denies any nausea.



She continues to be at this time on aspirin 81 mg daily.  She is on IV heparin,

Tradjenta, metoprolol tartrate 25 mg twice a day, Actos, sertraline, spironolactone.



PHYSICAL EXAMINATION:

Blood pressure running in the 100s with heart rate in the 60s.

LUNGS:  Clear.

HEART:  Regular rate and rhythm. S1, S2.  No S3 with systolic murmur and diastolic

murmur.

ABDOMEN:  Soft, mild tenderness.  No organomegaly.

EXTREMITIES:  No edema.



LAB DATA:

Revealed BUN and creatinine 48 and 2.03, worse than yesterday, hemoglobin is 9.9.



IMPRESSION:

1. Paroxysmal atrial fibrillation, back in sinus mechanism.

2. Mild troponin elevation with abnormal myocardial perfusion imaging.

3. Recurrent ascites.

4. Renal failure, worsening.

5. Non-alcoholic liver cirrhosis.

6. Diabetes mellitus.

7. Thrombocytopenia.



RECOMMENDATIONS:

From the cardiac standpoint, will continue on the present therapy.  The decision will

need to be made regarding prior anticoagulation and that depends on her progress and

the need for further intervention.  At this time, no indication for cardiac

catheterization in view of the worsening renal function.





MMODL / IJN: [id number] / Job#: [id number]

## 2020-03-14 LAB
ANION GAP SERPL CALC-SCNC: 9 MMOL/L
BASOPHILS # BLD AUTO: 0 K/UL (ref 0–0.2)
BASOPHILS NFR BLD AUTO: 0 %
BUN SERPL-SCNC: 48 MG/DL (ref 7–17)
CALCIUM SPEC-MCNC: 8.1 MG/DL (ref 8.4–10.2)
CHLORIDE SERPL-SCNC: 103 MMOL/L (ref 98–107)
CO2 SERPL-SCNC: 18 MMOL/L (ref 22–30)
EOSINOPHIL # BLD AUTO: 0.2 K/UL (ref 0–0.7)
EOSINOPHIL NFR BLD AUTO: 2 %
ERYTHROCYTE [DISTWIDTH] IN BLOOD BY AUTOMATED COUNT: 4.01 M/UL (ref 3.8–5.4)
ERYTHROCYTE [DISTWIDTH] IN BLOOD: 16.7 % (ref 11.5–15.5)
GLUCOSE BLD-MCNC: 187 MG/DL (ref 75–99)
GLUCOSE BLD-MCNC: 191 MG/DL (ref 75–99)
GLUCOSE BLD-MCNC: 224 MG/DL (ref 75–99)
GLUCOSE BLD-MCNC: 229 MG/DL (ref 75–99)
GLUCOSE BLD-MCNC: 266 MG/DL (ref 75–99)
GLUCOSE BLD-MCNC: 272 MG/DL (ref 75–99)
GLUCOSE BLD-MCNC: 279 MG/DL (ref 75–99)
GLUCOSE BLD-MCNC: 290 MG/DL (ref 75–99)
GLUCOSE BLD-MCNC: 310 MG/DL (ref 75–99)
GLUCOSE BLD-MCNC: 318 MG/DL (ref 75–99)
GLUCOSE BLD-MCNC: 336 MG/DL (ref 75–99)
GLUCOSE SERPL-MCNC: 251 MG/DL (ref 74–99)
HCT VFR BLD AUTO: 33.4 % (ref 34–46)
HGB BLD-MCNC: 10.2 GM/DL (ref 11.4–16)
LYMPHOCYTES # SPEC AUTO: 1.5 K/UL (ref 1–4.8)
LYMPHOCYTES NFR SPEC AUTO: 14 %
MCH RBC QN AUTO: 25.3 PG (ref 25–35)
MCHC RBC AUTO-ENTMCNC: 30.4 G/DL (ref 31–37)
MCV RBC AUTO: 83.1 FL (ref 80–100)
MONOCYTES # BLD AUTO: 0.9 K/UL (ref 0–1)
MONOCYTES NFR BLD AUTO: 8 %
NEUTROPHILS # BLD AUTO: 8 K/UL (ref 1.3–7.7)
NEUTROPHILS NFR BLD AUTO: 73 %
PLATELET # BLD AUTO: 178 K/UL (ref 150–450)
POTASSIUM SERPL-SCNC: 5.2 MMOL/L (ref 3.5–5.1)
SODIUM SERPL-SCNC: 130 MMOL/L (ref 137–145)
WBC # BLD AUTO: 10.9 K/UL (ref 3.8–10.6)

## 2020-03-14 RX ADMIN — SPIRONOLACTONE SCH MG: 25 TABLET, FILM COATED ORAL at 08:33

## 2020-03-14 RX ADMIN — GABAPENTIN SCH MG: 100 CAPSULE ORAL at 08:30

## 2020-03-14 RX ADMIN — CEFAZOLIN SCH MLS/HR: 330 INJECTION, POWDER, FOR SOLUTION INTRAMUSCULAR; INTRAVENOUS at 12:20

## 2020-03-14 RX ADMIN — INSULIN ASPART SCH UNIT: 100 INJECTION, SOLUTION INTRAVENOUS; SUBCUTANEOUS at 12:09

## 2020-03-14 RX ADMIN — INSULIN HUMAN SCH MLS/HR: 100 INJECTION, SOLUTION PARENTERAL at 17:47

## 2020-03-14 RX ADMIN — CYANOCOBALAMIN TAB 500 MCG SCH MCG: 500 TAB at 08:29

## 2020-03-14 RX ADMIN — NOREPINEPHRINE BITARTRATE SCH MLS/HR: 1 INJECTION, SOLUTION, CONCENTRATE INTRAVENOUS at 19:10

## 2020-03-14 RX ADMIN — INSULIN ASPART SCH UNIT: 100 INJECTION, SOLUTION INTRAVENOUS; SUBCUTANEOUS at 07:09

## 2020-03-14 RX ADMIN — PANTOPRAZOLE SODIUM SCH MG: 40 INJECTION, POWDER, FOR SOLUTION INTRAVENOUS at 08:35

## 2020-03-14 RX ADMIN — NOREPINEPHRINE BITARTRATE SCH MLS/HR: 1 INJECTION, SOLUTION, CONCENTRATE INTRAVENOUS at 10:43

## 2020-03-14 RX ADMIN — NOREPINEPHRINE BITARTRATE SCH MLS/HR: 1 INJECTION, SOLUTION, CONCENTRATE INTRAVENOUS at 02:04

## 2020-03-14 RX ADMIN — GABAPENTIN SCH MG: 100 CAPSULE ORAL at 17:02

## 2020-03-14 RX ADMIN — SERTRALINE HYDROCHLORIDE SCH MG: 50 TABLET, FILM COATED ORAL at 08:34

## 2020-03-14 RX ADMIN — NOREPINEPHRINE BITARTRATE SCH MLS/HR: 1 INJECTION, SOLUTION, CONCENTRATE INTRAVENOUS at 06:32

## 2020-03-14 RX ADMIN — GABAPENTIN SCH MG: 100 CAPSULE ORAL at 21:20

## 2020-03-14 RX ADMIN — Medication SCH UNIT: at 08:28

## 2020-03-14 RX ADMIN — METOPROLOL TARTRATE SCH MG: 25 TABLET, FILM COATED ORAL at 21:20

## 2020-03-14 RX ADMIN — METOPROLOL TARTRATE SCH MG: 25 TABLET, FILM COATED ORAL at 08:35

## 2020-03-14 RX ADMIN — NOREPINEPHRINE BITARTRATE SCH MLS/HR: 1 INJECTION, SOLUTION, CONCENTRATE INTRAVENOUS at 15:11

## 2020-03-14 RX ADMIN — CEFAZOLIN SCH: 330 INJECTION, POWDER, FOR SOLUTION INTRAMUSCULAR; INTRAVENOUS at 03:46

## 2020-03-14 RX ADMIN — ASPIRIN 81 MG CHEWABLE TABLET SCH MG: 81 TABLET CHEWABLE at 08:28

## 2020-03-14 RX ADMIN — Medication SCH MG: at 08:34

## 2020-03-14 RX ADMIN — FLUDROCORTISONE ACETATE SCH MG: 0.1 TABLET ORAL at 21:21

## 2020-03-14 RX ADMIN — PIOGLITAZONE SCH MG: 30 TABLET ORAL at 08:34

## 2020-03-14 NOTE — PN
PROGRESS NOTE



DATE OF SERVICE:

March 14, 2020



Patient is a 79-year-old pleasant white female with history of non alcoholic cirrhosis

of the liver and ascites who was admitted to the hospital and underwent paracentesis.

While in the hospital, developed atrial fibrillation and hypertension and has been in

the intensive care unit since.  She is doing much better and remains on Levophed.

Overall feeling good.  She denies any symptoms.  She reports no abdominal pain.  No

abdominal distention.  On a regular diet tolerating well.  She remains on IV heparin.



PHYSICAL EXAMINATION:

She appears comfortable.  No apparent distress.

VITAL SIGNS:  Stable.  Blood pressure is 93/34, pulse rate 73, and afebrile.

HEENT:  Examination unremarkable, conjunctivae are pink, sclerae anicteric.  Oral

cavity no lesions.

NECK: No JVD or lymph node enlargement.

CHEST: Clear to auscultation.

HEART:  Regular rate and rhythm.

ABDOMEN:  Soft.  Bowel sounds are positive.  No organomegaly.

EXTREMITIES:  No pedal edema.

SKIN no rashes.

NEURO:  She is alert and oriented x3.  No focal deficits.



LABS:

Labs from today:  WBC 10.9, hemoglobin 10.2, platelets normal.  Basic metabolic panel

showed BUN of 48, creatinine 1.93.



IMPRESSION:

1. Nonalcoholic cirrhosis of the liver with gradual decompensation.

2. Ascites requiring paracentesis every 3-4 weeks, last one was done 5 days ago and

    3.1 L removed.  Presently on low dose of diuretics.  BUN and creatinine are 48 and

    1.93 respectively.

3. Chronic kidney disease.

4. Atrial fibrillation with RVR, which is controlled.

5. Hypertension, on Levophed.



RECOMMENDATION:

1. Continue with ICU management.

2. Continue with low-dose diuretics.

3. Monitor labs on a daily basis.

4. We will follow with you.

Thank you for this consultation.





MMODL / IJN: [id number] / Job#: [id number]

## 2020-03-14 NOTE — PN
PROGRESS NOTE



DATE OF SERVICE:

03/14/2020



This 79-year-old woman is admitted with ascites also had multiple other medical

problems including chest pain, atrial fibrillation, hypotension.  The patient is

needing Levophed small dose at this time.  The random cortisol level is about 13
around

which is done on the mid noon and mid day.  The patient is also having 
fluctuating

blood sugars more than 300 which has not been well controlled with insulin 
periodic

injections.



PAST MEDICAL HISTORY:

Reviewed.



REVIEW OF SYSTEMS:

Cardiovascular system: No angina or palpitations.

Respiratory: As mentioned earlier.

GI:  As mentioned earlier.

: No dysuria.

CENTRAL NERVOUS SYSTEM: No numbness or weakness.



CURRENT MEDICATIONS:

Reviewed and include:

1. Tylenol p.r.n.

2. Xanax 0.5 t.i.d.

3. Aspirin 81 mg.

4. Rocephin 1 g.

5. Vitamin B12.

6. Neurontin 100 mg q.h.s.

7. Heparin 5000 subcu b.i.d.

8. Heparin drip.

9. Humulin-N.

10.Lopressor.

11.Narcan.

12.Nitrostat.

13.Levophed.

14.Zofran.

15.Protonix.

16.Actos.

17.Zoloft.

18.Aldactone.

19.Restoril.



PHYSICAL EXAM:

Patient is alert, oriented times three.  Pulse 62. Blood pressure 112/30, 
respirations

16, temp 98 degrees, pulse ox 97% on 2 L.

HEENT:  Conjunctivae normal.

NECK: No JVD.

CARDIOVASCULAR: S1, S2 muffled.

RESPIRATION: Breath sounds diminished in the bases.  A few scattered rhonchi.  
No

crackles.

ABDOMEN:  Soft, nontender.

LEGS no edema. No swelling.

CENTRAL NERVOUS SYSTEM: No focal deficits.



LABS:

WBC 10.9, hemoglobin 10.2, and platelets are 178.  Sodium 130, potassium 5.2,

creatinine is 1.9, glucose noted, 251, 263, 336 and 318.  Cortisol is 13.



ASSESSMENT:

1. Ascites, acute exacerbation secondary to nonalcoholic cirrhosis of the liver,

    status post abdominal paracentesis on presentation.

2. Chest pain with troponin 0.050, possible acute non-ST-segment-elevation 
myocardial

    infarction with abnormal stress test.

3. Relative hypotension multifactorial possibly cardiogenic shock as well.

4. Atrial fibrillation with fast ventricular rate.

5. Persistent hypotension on Levophed.

6. Acute renal failure, acute tubular necrosis and prerenal factors.

7. Acute urinary tract infection present on admission.

8. Anemia, microcytic anemia undetermined etiology.

9. Diabetes mellitus type 2, uncontrolled with hyperglycemia.

10.Chronic mild coagulopathy secondary to chronic liver disease possibly.

11.Severe hyponatremia, possibly Syndrome of inappropriate antidiuretic hormone.

12.Diabetes mellitus type 2, uncontrolled with hyperglycemia.

13.Obesity.

14.Hypertension.

15.Chronic liver disease.

16.Thrombocytopenia.

17.History of left breast cancer with lumpectomy.

18.History of peripheral neuropathy.

19.History of breast surgery.

20.History of cholecystectomy.

21.History of depression.

22.FULL CODE.



RECOMMENDATIONS AND DISCUSSION:

Recommend to continue current medications, continue to monitor.  Symptomatic 
treatment.

Otherwise, at this time, I recommend continue with current medications.  I would

recommend to use insulin drip to control the blood sugars.  I would also 
recommend

 continue the Levophed and I would add Florinef to the current regimen  for the

persistent hypotension.  The prognosis guarded because of multiple complex 
medical

issues and further recommendations to follow.  We will continue to monitor.  The
heart

rate is better controlled and currently remains in sinus rhythm at this time.





MMODL / IJN: [id number] / Job#: [id number]

KRANTHI

## 2020-03-14 NOTE — XR
EXAMINATION TYPE: XR chest 1V portable

 

DATE OF EXAM: 3/14/2020

 

COMPARISON: 3/12/2020

 

HISTORY: Congestive heart failure and shortness of breath

 

TECHNIQUE: Single frontal view of the chest is obtained.

 

FINDINGS:  Cardia mediastinal silhouette is enlarged. There is worsening central pulmonary vascular c
ongestion and small bilateral layering pleural effusions with associated bibasilar airspace disease. 
No acute osseous pathology seen. Diffuse osseous demineralization. Surgical clips in the left breast/
axilla.

 

IMPRESSION:  Worsening fluid overload likely on the basis of decompensated congestive heart failure w
ith new small pleural effusions.

## 2020-03-14 NOTE — PN
PROGRESS NOTE



This patient has a history of nonalcoholic cirrhosis of the liver. Patient developed

atrial fibrillation with a rapid ventricular rate as well as progressive renal failure.

Patient had some abnormal troponin and initial plan was to undergo cardiac

catheterization, but because of the patient's worsening renal function at present,

medical treatment is recommended.  The patient is lying comfortably in bed.  No

respiratory distress is noted.  Her respiratory rate is 28, blood pressure is 113/48

mmHg.  First and second heart sounds are normal. Lungs are clear to auscultation and

percussion. Patient's creatinine is 1.93.  We will continue the current medications,

and if it is okay with the GI service, we will start the patient on the Coumadin.





MMODL / IJN: [id number] / Job#: [id number]

## 2020-03-14 NOTE — P.PN
Subjective


Progress Note Date: 03/14/20








This is a 79-year-old female patient with known history of nonalcoholic liver 

cirrhosis.  The patient also has history of diabetes and hypertension.  She has 

had previous paracentesis on several occasions for abdominal distention and 

ascites.  The patient came into the hospital and the patient was noted to have 

increased abdominal distention associated with some shortness of breath.  She 

also had some limited chest pain.  No nausea or vomiting.  She underwent a 

paracentesis for a total of 3.2 L of ascitic fluid was removed and it was not 

sent for any cultures.  The patient subsequently improved and she was doing 

well.  Her troponins were positive at 0.5.  The patient was given a cardiac 

stress test that came back positive for reversible ischemia and based on that 

and based on the positive stress test, the patient was being planned for a 

cardiac catheterization.  Earlier this morning the patient went into atrial 

fibrillation with RVR.  Her blood pressure dropped and she got transferred to [facility] intensive care unit.  Upon arrival her systolic blood pressure was in the 60s.

 The patient was started on IV fluids bolus and she was given 1.5 L.  

Subsequently she was given 2.5 mg of IV Lopressor.  This afternoon she converted

back to normal sinus rhythm.  She is currently in normal sinus rhythm with a 

rate of 61.  She is on IV heparin.  She is doing well without any chest pain.  

She is on norepinephrine infusion running at 0.09 g per KG per minute.  Her 

most recent blood pressure 95/44.  Note that her creatinine came up to 1.8 and 

the patient is producing urine output in the order of 30 mL an hour.  No 

abdominal distention.  Chest x-ray shows some cardiomegaly.  Echo of the heart 

showed EF of around 55-60%.  Resume cardiac structures were all within normal 

limits.  The patient had mild aortic stenosis.  The right ventricular systolic 

pressure was 43.





The patient is seen today 03/13/202 in the intensive care unit. She is currently

awake and alert in no acute distress.  She remains hypotensive however.  She  is

on norepinephrine at 0.16 mcg/kg/m.  0.9 normal saline at 75 ML's per hour.  

Heparin drip per weight base protocol. Currently in sinus rhythm.  Maintaining 

O2 saturations in the 90s on 2 L/m per nasal cannula. White count 10.4.  

Hemoglobin 9.9.  Platelet count 181,000.  Sodium 129.  Potassium 5.1.  Bicarb 

19.  Creatinine 2.03.  TSH 1.12.





On 03/14/2020 on seeing the patient for a follow-up.  This is a follow-up this 

being done in the intensive care unit.  The patient is looking quite 

comfortable.  No altered mentation.  His resting comfortably in bed.  No tachy

cardia.  Her cardiac rhythm remains sinus.  Nevertheless, the patient remains 

profoundly hypotensive.  She is requiring pressors and currently norepinephrine 

infusion is running at 0.2 mcg/kg per minute.  The exact cause is not clear.  

She is afebrile.  No significant leukocytosis.  No nausea vomiting.  No 

abdominal distention.  No diarrhea.  No respiratory distress.  She has an art 

line catheter and the blood pressure is being monitored for an outlying catheter

for now.  She is on empiric antibiotic coverage with IV Rocephin.  Her white 

cell count is at 10.9.  He has a 48 with a creatinine of 1.9.  She received a 

total of 3 5% albumin for hemodynamic support.  TPN is a 48 with a creatinine of

1.9.  The serum cortisols at 13.  No other significant issues for now.  She is 

resting comfortably in bed.  No signs of any encephalopathy.  As stated cardiac 

rhythm is sinus and her LV ejection fraction is 55-60%.





Objective





- Vital Signs


Vital signs: 


                                   Vital Signs











Temp  98.2 F   03/14/20 12:00


 


Pulse  60   03/14/20 15:00


 


Resp  20   03/14/20 15:00


 


BP  113/48   03/14/20 12:00


 


Pulse Ox  98   03/14/20 15:00








                                 Intake & Output











 03/13/20 03/14/20 03/14/20





 18:59 06:59 18:59


 


Intake Total 2447.052 2256.273 4448.000


 


Output Total 505 440 595


 


Balance 1975.008 8826.398 1113.000


 


Weight  90.2 kg 


 


Intake:   


 


  IV 1190 900 800


 


    Albumin Human 5% 250 ml 250  





    In Empty Bag 1 bag @ 250   





    mls/hr IVPB ONCE ONE Rx#:   





    [id number]   


 


    Sodium Chloride 0.9% 1, 890 900 750





    000 ml @ 75 mls/hr IV .   





    [id number] [facility] Rx#:[id number]   


 


    cefTRIAXone 1 gm In 50  50





    Sodium Chloride 0.9% 50   





    ml @ 100 mls/hr IVPB   





    Q24HR [facility] Rx#:[id number]   


 


  Intake, IV Titration 537.052 641.398 508.000





  Amount   


 


    Heparin Sod,Pork in 0.45% 109.235 64.366 





    NaCl 25,000 unit In 0.45   





    % NaCl 1 250ml.bag @ 12   





    UNITS/KG/HR 9.998 mls/hr   





    IV .Q24H K[facility]USHI Rx#:   





    [id number]   


 


    Norepinephrine 4 mg In 427.817 577.032 508.000





    Sodium Chloride 0.9% 250   





    ml @ 0.05 MCG/KG/MIN 15.   





    872 mls/hr IV .Q16H1M KHUSHI   





    Rx#:[id number]   


 


  Oral 720  400


 


Output:   


 


  Urine 505 440 595


 


Other:   


 


  Voiding Method Indwelling Catheter Indwelling Catheter Indwelling Catheter


 


  # Voids   2








                       ABP, PAP, CO, CI - Last Documented











Arterial Blood Pressure        99/34

















- Exam








GENERAL EXAM: Alert, pleasant 79 yold female, on 2 L nasal cannula, comfortable 

in no apparent distress.


HEAD: Normocephalic.


EYES: Normal reaction of pupils, equal size.


NOSE: Clear with pink turbinates.


THROAT: No erythema or exudates.


NECK: No masses, no JVD.


CHEST: No chest wall deformity.


LUNGS: Equal air entry with crackles in the bases.


CVS: S1 and S2 normal with no audible murmur, regular rhythm.


ABDOMEN: Soft, normal bowel sounds, no guarding or rigidity.


SPINE: No scoliosis or deformity


SKIN: No rashes


CENTRAL NERVOUS SYSTEM: No focal deficits, tone is normal in all 4 extremities.


EXTREMITIES: There is no peripheral edema. 





- Labs


CBC & Chem 7: 


                                 03/14/20 05:00





                                 03/14/20 05:00


Labs: 


                  Abnormal Lab Results - Last 24 Hours (Table)











  03/13/20 03/13/20 03/14/20 Range/Units





  16:59 21:00 05:00 


 


WBC    10.9 H  (3.8-10.6)  k/uL


 


Hgb    10.2 L  (11.4-16.0)  gm/dL


 


Hct    33.4 L  (34.0-46.0)  %


 


MCHC    30.4 L  (31.0-37.0)  g/dL


 


RDW    16.7 H  (11.5-15.5)  %


 


Neutrophils #    8.0 H  (1.3-7.7)  k/uL


 


APTT     (22.0-30.0)  sec


 


Sodium     (137-145)  mmol/L


 


Potassium     (3.5-5.1)  mmol/L


 


Carbon Dioxide     (22-30)  mmol/L


 


BUN     (7-17)  mg/dL


 


Creatinine     (0.52-1.04)  mg/dL


 


Glucose     (74-99)  mg/dL


 


POC Glucose (mg/dL)  237 H  257 H   (75-99)  mg/dL


 


Calcium     (8.4-10.2)  mg/dL














  03/14/20 03/14/20 03/14/20 Range/Units





  05:00 05:00 06:47 


 


WBC     (3.8-10.6)  k/uL


 


Hgb     (11.4-16.0)  gm/dL


 


Hct     (34.0-46.0)  %


 


MCHC     (31.0-37.0)  g/dL


 


RDW     (11.5-15.5)  %


 


Neutrophils #     (1.3-7.7)  k/uL


 


APTT   67.9 H   (22.0-30.0)  sec


 


Sodium  130 L    (137-145)  mmol/L


 


Potassium  5.2 H    (3.5-5.1)  mmol/L


 


Carbon Dioxide  18 L    (22-30)  mmol/L


 


BUN  48 H    (7-17)  mg/dL


 


Creatinine  1.93 H    (0.52-1.04)  mg/dL


 


Glucose  251 H    (74-99)  mg/dL


 


POC Glucose (mg/dL)    266 H  (75-99)  mg/dL


 


Calcium  8.1 L    (8.4-10.2)  mg/dL














  03/14/20 Range/Units





  11:54 


 


WBC   (3.8-10.6)  k/uL


 


Hgb   (11.4-16.0)  gm/dL


 


Hct   (34.0-46.0)  %


 


MCHC   (31.0-37.0)  g/dL


 


RDW   (11.5-15.5)  %


 


Neutrophils #   (1.3-7.7)  k/uL


 


APTT   (22.0-30.0)  sec


 


Sodium   (137-145)  mmol/L


 


Potassium   (3.5-5.1)  mmol/L


 


Carbon Dioxide   (22-30)  mmol/L


 


BUN   (7-17)  mg/dL


 


Creatinine   (0.52-1.04)  mg/dL


 


Glucose   (74-99)  mg/dL


 


POC Glucose (mg/dL)  336 H  (75-99)  mg/dL


 


Calcium   (8.4-10.2)  mg/dL














Assessment and Plan


Plan: 











1 acute hypotension accompanied by CHELO castillo RVR, currently converted back to 

normal sinus rhythm and the patient's blood pressure remains low and the 

patient's pressor dependent on norepinephrine infusion at 0.20 g per KG per mi

nute.  She does have abnormal stress test with induced left ventricular 

myocardial ischemia involving the lateral wall.  Consider underlying coronary 

artery disease.  The patient continues to be pressor dependent.  On today's 

evaluation she is still requiring high doses of pressors.  She was given IV 

fluids and albumin and had improvement in the urine output.  Nevertheless, there

has been slowly recovering her blood pressure response.  No clear signs of 

septicemia.  Serum cortisol that 13.  As such, consider low vascular tone/sepsis

underlying to hypotension.  Not sure if this is a cardiogenic source despite her

having some abnormal stress test.  LV ejection fraction is preserved.





2 nonalcoholic liver cirrhosis and the patient is status post large volume 

paracentesis last paracentesis being done 03/10/2020


3 paroxysmal atrial fibrillation currently in normal sinus rhythm and the 

patient is on IV heparin 


4 positive troponins.  An underlying positive stress test about underlying 

coronary artery disease


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 

coagulopathy


6 diabetes mellitus type 2


7 peripheral neuropathy


8 history of breast cancer with a previous lumpectomy on the left


9 mild thrombocytopenia


10 acute kidney injury, probably related to hypotension/large volume 

paracentesis.  Consider prerenal azotemia.  The patient is nonoliguric at this 

point in time.





Plan





We'll try to achieve a lower norepinephrine infusion rate while maintaining a me

an arterial pressure above 65


Continue pressors


Continue empiric antibiotic coverage


May need to repeat an echocardiogram the next 24-48 hours if she patient remains

hypotensive


Continue IV heparin


Incidental triple-lumen catheter for IV access and pressors and CVP monitoring


Monitor the renal function.


We'll continue to follow.  The patient be kept in ICU as long as she is pressor 

dependent.

## 2020-03-15 LAB
ALT SERPL-CCNC: 13 U/L (ref 4–34)
AMMONIA PLAS-SCNC: 158 UMOL/L (ref ?–30)
ANION GAP SERPL CALC-SCNC: 8 MMOL/L
AST SERPL-CCNC: 23 U/L (ref 14–36)
BASOPHILS # BLD AUTO: 0 K/UL (ref 0–0.2)
BASOPHILS NFR BLD AUTO: 0 %
BUN SERPL-SCNC: 47 MG/DL (ref 7–17)
CALCIUM SPEC-MCNC: 8.1 MG/DL (ref 8.4–10.2)
CHLORIDE SERPL-SCNC: 111 MMOL/L (ref 98–107)
CO2 SERPL-SCNC: 15 MMOL/L (ref 22–30)
EOSINOPHIL # BLD AUTO: 0.1 K/UL (ref 0–0.7)
EOSINOPHIL NFR BLD AUTO: 2 %
ERYTHROCYTE [DISTWIDTH] IN BLOOD BY AUTOMATED COUNT: 3.6 M/UL (ref 3.8–5.4)
ERYTHROCYTE [DISTWIDTH] IN BLOOD: 16.7 % (ref 11.5–15.5)
GLUCOSE BLD-MCNC: 137 MG/DL (ref 75–99)
GLUCOSE BLD-MCNC: 162 MG/DL (ref 75–99)
GLUCOSE BLD-MCNC: 162 MG/DL (ref 75–99)
GLUCOSE BLD-MCNC: 166 MG/DL (ref 75–99)
GLUCOSE BLD-MCNC: 168 MG/DL (ref 75–99)
GLUCOSE BLD-MCNC: 168 MG/DL (ref 75–99)
GLUCOSE BLD-MCNC: 169 MG/DL (ref 75–99)
GLUCOSE BLD-MCNC: 171 MG/DL (ref 75–99)
GLUCOSE BLD-MCNC: 176 MG/DL (ref 75–99)
GLUCOSE BLD-MCNC: 178 MG/DL (ref 75–99)
GLUCOSE BLD-MCNC: 179 MG/DL (ref 75–99)
GLUCOSE BLD-MCNC: 181 MG/DL (ref 75–99)
GLUCOSE BLD-MCNC: 182 MG/DL (ref 75–99)
GLUCOSE BLD-MCNC: 186 MG/DL (ref 75–99)
GLUCOSE BLD-MCNC: 193 MG/DL (ref 75–99)
GLUCOSE BLD-MCNC: 193 MG/DL (ref 75–99)
GLUCOSE BLD-MCNC: 197 MG/DL (ref 75–99)
GLUCOSE BLD-MCNC: 215 MG/DL (ref 75–99)
GLUCOSE BLD-MCNC: 225 MG/DL (ref 75–99)
GLUCOSE SERPL-MCNC: 174 MG/DL (ref 74–99)
HCT VFR BLD AUTO: 29.9 % (ref 34–46)
HGB BLD-MCNC: 9.1 GM/DL (ref 11.4–16)
HYALINE CASTS UR QL AUTO: 33 /LPF (ref 0–2)
LACTATE BLDV-SCNC: 1.7 MMOL/L (ref 0.7–2)
LYMPHOCYTES # SPEC AUTO: 0.9 K/UL (ref 1–4.8)
LYMPHOCYTES NFR SPEC AUTO: 13 %
MCH RBC QN AUTO: 25.1 PG (ref 25–35)
MCHC RBC AUTO-ENTMCNC: 30.3 G/DL (ref 31–37)
MCV RBC AUTO: 82.9 FL (ref 80–100)
MONOCYTES # BLD AUTO: 0.5 K/UL (ref 0–1)
MONOCYTES NFR BLD AUTO: 7 %
NEUTROPHILS # BLD AUTO: 5.3 K/UL (ref 1.3–7.7)
NEUTROPHILS NFR BLD AUTO: 76 %
PH UR: 5.5 [PH] (ref 5–8)
PLATELET # BLD AUTO: 102 K/UL (ref 150–450)
POTASSIUM SERPL-SCNC: 4.8 MMOL/L (ref 3.5–5.1)
PROT UR QL: (no result)
RBC UR QL: 1 /HPF (ref 0–5)
SODIUM SERPL-SCNC: 134 MMOL/L (ref 137–145)
SP GR UR: 1.02 (ref 1–1.03)
UROBILINOGEN UR QL STRIP: <2 MG/DL (ref ?–2)
WBC # BLD AUTO: 6.9 K/UL (ref 3.8–10.6)
WBC #/AREA URNS HPF: 3 /HPF (ref 0–5)

## 2020-03-15 RX ADMIN — Medication SCH MG: at 11:59

## 2020-03-15 RX ADMIN — NOREPINEPHRINE BITARTRATE SCH MLS/HR: 1 INJECTION, SOLUTION, CONCENTRATE INTRAVENOUS at 05:25

## 2020-03-15 RX ADMIN — FLUDROCORTISONE ACETATE SCH MG: 0.1 TABLET ORAL at 08:59

## 2020-03-15 RX ADMIN — METOPROLOL TARTRATE SCH MG: 25 TABLET, FILM COATED ORAL at 21:30

## 2020-03-15 RX ADMIN — NOREPINEPHRINE BITARTRATE SCH MLS/HR: 1 INJECTION, SOLUTION, CONCENTRATE INTRAVENOUS at 09:00

## 2020-03-15 RX ADMIN — METOPROLOL TARTRATE SCH MG: 25 TABLET, FILM COATED ORAL at 08:59

## 2020-03-15 RX ADMIN — Medication SCH MG: at 08:58

## 2020-03-15 RX ADMIN — GABAPENTIN SCH MG: 100 CAPSULE ORAL at 21:30

## 2020-03-15 RX ADMIN — POTASSIUM CHLORIDE SCH MLS/HR: 14.9 INJECTION, SOLUTION INTRAVENOUS at 10:42

## 2020-03-15 RX ADMIN — Medication SCH MG: at 18:07

## 2020-03-15 RX ADMIN — PANTOPRAZOLE SODIUM SCH MG: 40 INJECTION, POWDER, FOR SOLUTION INTRAVENOUS at 08:57

## 2020-03-15 RX ADMIN — PIOGLITAZONE SCH MG: 30 TABLET ORAL at 08:58

## 2020-03-15 RX ADMIN — Medication SCH MG: at 21:31

## 2020-03-15 RX ADMIN — NOREPINEPHRINE BITARTRATE SCH MLS/HR: 1 INJECTION, SOLUTION, CONCENTRATE INTRAVENOUS at 00:10

## 2020-03-15 RX ADMIN — Medication SCH: at 09:57

## 2020-03-15 RX ADMIN — LACTULOSE SCH GM: 20 SOLUTION ORAL at 11:57

## 2020-03-15 RX ADMIN — ASPIRIN 81 MG CHEWABLE TABLET SCH MG: 81 TABLET CHEWABLE at 08:59

## 2020-03-15 RX ADMIN — NOREPINEPHRINE BITARTRATE SCH MLS/HR: 1 INJECTION, SOLUTION, CONCENTRATE INTRAVENOUS at 20:10

## 2020-03-15 RX ADMIN — SERTRALINE HYDROCHLORIDE SCH MG: 50 TABLET, FILM COATED ORAL at 08:59

## 2020-03-15 RX ADMIN — INSULIN HUMAN SCH MLS/HR: 100 INJECTION, SOLUTION PARENTERAL at 05:26

## 2020-03-15 RX ADMIN — FLUDROCORTISONE ACETATE SCH MG: 0.1 TABLET ORAL at 21:30

## 2020-03-15 RX ADMIN — NOREPINEPHRINE BITARTRATE SCH MLS/HR: 1 INJECTION, SOLUTION, CONCENTRATE INTRAVENOUS at 15:33

## 2020-03-15 RX ADMIN — GABAPENTIN SCH MG: 100 CAPSULE ORAL at 18:07

## 2020-03-15 RX ADMIN — INSULIN HUMAN SCH MLS/HR: 100 INJECTION, SOLUTION PARENTERAL at 22:54

## 2020-03-15 RX ADMIN — CYANOCOBALAMIN TAB 500 MCG SCH MCG: 500 TAB at 08:59

## 2020-03-15 RX ADMIN — Medication SCH UNIT: at 08:59

## 2020-03-15 RX ADMIN — SPIRONOLACTONE SCH MG: 25 TABLET, FILM COATED ORAL at 08:58

## 2020-03-15 RX ADMIN — GABAPENTIN SCH MG: 100 CAPSULE ORAL at 08:59

## 2020-03-15 RX ADMIN — HEPARIN SODIUM SCH MLS/HR: 10000 INJECTION, SOLUTION INTRAVENOUS at 06:27

## 2020-03-15 NOTE — PN
PROGRESS NOTE



DATE OF SERVICE:

03/15/2020



The patient is a 79 -year-old white female admitted to the hospital with history of non-

alcoholic cirrhosis of the liver, with portal alcohol portal hypertension and ascites

who was admitted to the hospital with abdominal distention and subsequently developed

atrial fibrillation with RVR and hypertension and since then has been in the intensive

care unit.  She is doing much better.  She denies any abdominal pain.  No abdominal

distention.  No nausea, vomiting.



PHYSICAL EXAMINATION:

Appears comfortable.  No apparent distress.

VITAL SIGNS:  Stable.  Blood pressure 171/46, pulse 81, temperature 98.2.

HEENT examination unremarkable. Conjunctivae pink. Sclerae anicteric.  Oral cavity no

lesions.

NECK: No JVD or lymph node enlargement.

CHEST: Clear to auscultation.

HEART:  Regular rate and rhythm.

ABDOMEN:  Obese, but it was very soft, nontender, nondistended. Liver and spleen were

not palpable.  Bowel sounds are positive.  No organomegaly.

EXTREMITIES: No pedal edema.

SKIN:  No rashes.

NEURO:  She is alert and oriented x3.  No focal deficits.



LABS:

Today WBC 6.9, hemoglobin 9.1, platelets 102.  Basic metabolic panel showed a BUN of 47

and creatinine 1.98. _____ 60.1.



IMPRESSION:

1. Non-alcoholic cirrhosis of the liver with portal hypertension and ascites status

    post large volume paracentesis five days ago.  The patient remains stable.

2. Atrial fibrillation with RVR and hypotension still remains in the intensive care

    unit and being monitored closely.  Remains on IV heparin drip.

3. Chronic kidney disease with stable BUN and creatinine.



RECOMMENDATION:

1. Continue management as per ICU.

2. Continue low-dose diuretics.

3. We will monitor her closely.

Thank you for this consultation.





MMODL / IJN: [id number] / Job#: [id number]

## 2020-03-15 NOTE — PN
PROGRESS NOTE



DATE OF SERVICE:

03/15/2020



This 78-year-old woman admitted with multiple medical problems including chest 
pain,

also had ascites on admission.  The patient also had  hypertension, atrial

fibrillation.  Patient is on Levophed support.  The patient being closely 
monitored at

this time.  Multiple consultants are following the patient closely.  The patient
is on

IV heparin.  The 2D echo showed an ejection fraction 55-60 percent with multiple

valvular abnormalities.  Patient being closely monitored.



PAST MEDICAL HISTORY:

Reviewed.



REVIEW OF SYMPTOMS:

Cardiovascular:  No angina or palpitations.

Respiration as mentioned earlier.

GI: As mentioned earlier.

: No dysuria or retention.

CENTRAL NERVOUS SYSTEM: No numbness or weakness.



CURRENT MEDICATIONS:

Reviewed and include:

1. Tylenol p.r.n.

2. Xanax 0.5 t.i.d.

3. Aspirin 81 mg daily.

4. Rocephin 1 g daily.

5. Vitamin B12.

6. Florinef 0.2 b.i.d.

7. Neurontin 100 mg b.i.d.

8. Heparin.

9. Dilaudid.

10.Cephulac.

11.Lopressor 25 b.i.d.

12.Niacin.

13.Nitrostat.

14.Zofran.

15.Protonix.

16.Actos.



PHYSICAL EXAMINATION:

Alert and oriented times three.  Pulse is 112.  Blood pressure 139/40, 
respirations 17,

temperature is normal.  Pulse ox 97% on 2 L.

HEENT: Conjunctivae normal.

NECK: No JVD.

CARDIOVASCULAR: S1, S2 muffled.

RESPIRATORY: Breath sounds diminished in the bases.  Bilateral scattered rhonchi
and

crackles.

ABDOMEN is soft, nontender.

LEGS are no edema, no swelling.

CENTRAL NERVOUS SYSTEM: No focal deficits.



LAB STUDIES:

APTT noted. WBC 10.9, sodium 130, potassium 5.2.  Creatinine 1.93.



ASSESSMENT:

1. Ascites, acute exacerbation secondary to nonalcoholic cirrhosis of the liver 
status

    post abdominal paracentesis, present on admission.

2. Chest pain with troponin 0.050 possible acute non-ST-segment-elevation 
myocardial

    infarction with abnormal stress test.

3. Relative hypotension, multifactorial possibly cardiogenic shock as well.

4. Atrial fibrillation fast ventricular rate, paroxysmal.

5. Persistent hypotension on Levophed.

6. Acute renal failure, acute tubular necrosis and prerenal factors.

7. Acute urinary tract infection present on admission.

8. Anemia, microcytic anemia of undetermined etiology.

9. Diabetes mellitus type 2, uncontrolled with hyperglycemia.

10.Chronic mild coagulopathy secondary to chronic liver disease, possibly.

11.Severe hyponatremia, possibly Syndrome of inappropriate antidiuretic hormone.

12.Diabetes mellitus type 2, uncontrolled with hyperglycemia.

13.Mild metabolic acidosis.

14.Obesity.

15.Hypertension.

16.Chronic liver disease.

17.Thrombocytopenia.

18.History of left breast cancer with lumpectomy.

19.History of peripheral neuropathy.

20.History of breast surgery.

21.History of cholecystectomy.

22.History of depression.

23.FULL CODE.



RECOMMENDATIONS AND DISCUSSION:

Recommend to continue current medications, management, and symptomatic 
treatment.

Otherwise, at this time, I recommend continue with current medications, continue
with

Levophed support.  Closely follow with Cardiology.  The patient is on empiric

antibiotics as well.  The most recent cultures are negative so far.  Otherwise, 
we will

continue to monitor.  The patient is also on beta blockers.  Monitor blood 
sugars

closely.  The patient also has some bicarb drip also.  Otherwise the prognosis 
guarded

because of multiple complex medical issues.  Further recommendations to follow. 
See

orders for details.  Discussed with the patient who understands and agrees.





MMODL / IJN: [id number] / Job#: [id number]

KRANTHI

## 2020-03-15 NOTE — P.PN
Subjective


Progress Note Date: 03/15/20








This is a 79-year-old female patient with known history of nonalcoholic liver 

cirrhosis.  The patient also has history of diabetes and hypertension.  She has 

had previous paracentesis on several occasions for abdominal distention and 

ascites.  The patient came into the hospital and the patient was noted to have 

increased abdominal distention associated with some shortness of breath.  She 

also had some limited chest pain.  No nausea or vomiting.  She underwent a 

paracentesis for a total of 3.2 L of ascitic fluid was removed and it was not 

sent for any cultures.  The patient subsequently improved and she was doing 

well.  Her troponins were positive at 0.5.  The patient was given a cardiac 

stress test that came back positive for reversible ischemia and based on that 

and based on the positive stress test, the patient was being planned for a 

cardiac catheterization.  Earlier this morning the patient went into atrial 

fibrillation with RVR.  Her blood pressure dropped and she got transferred to [facility] intensive care unit.  Upon arrival her systolic blood pressure was in the 60s.

 The patient was started on IV fluids bolus and she was given 1.5 L.  

Subsequently she was given 2.5 mg of IV Lopressor.  This afternoon she converted

back to normal sinus rhythm.  She is currently in normal sinus rhythm with a 

rate of 61.  She is on IV heparin.  She is doing well without any chest pain.  

She is on norepinephrine infusion running at 0.09 g per KG per minute.  Her 

most recent blood pressure 95/44.  Note that her creatinine came up to 1.8 and 

the patient is producing urine output in the order of 30 mL an hour.  No 

abdominal distention.  Chest x-ray shows some cardiomegaly.  Echo of the heart 

showed EF of around 55-60%.  Resume cardiac structures were all within normal 

limits.  The patient had mild aortic stenosis.  The right ventricular systolic 

pressure was 43.





The patient is seen today 03/13/202 in the intensive care unit. She is currently

awake and alert in no acute distress.  She remains hypotensive however.  She  is

on norepinephrine at 0.16 mcg/kg/m.  0.9 normal saline at 75 ML's per hour.  

Heparin drip per weight base protocol. Currently in sinus rhythm.  Maintaining 

O2 saturations in the 90s on 2 L/m per nasal cannula. White count 10.4.  

Hemoglobin 9.9.  Platelet count 181,000.  Sodium 129.  Potassium 5.1.  Bicarb 

19.  Creatinine 2.03.  TSH 1.12.





On 03/14/2020 on seeing the patient for a follow-up.  This is a follow-up this 

being done in the intensive care unit.  The patient is looking quite 

comfortable.  No altered mentation.  His resting comfortably in bed.  No tachy

cardia.  Her cardiac rhythm remains sinus.  Nevertheless, the patient remains 

profoundly hypotensive.  She is requiring pressors and currently norepinephrine 

infusion is running at 0.2 mcg/kg per minute.  The exact cause is not clear.  

She is afebrile.  No significant leukocytosis.  No nausea vomiting.  No 

abdominal distention.  No diarrhea.  No respiratory distress.  She has an art 

line catheter and the blood pressure is being monitored for an outlying catheter

for now.  She is on empiric antibiotic coverage with IV Rocephin.  Her white 

cell count is at 10.9.  He has a 48 with a creatinine of 1.9.  She received a 

total of 3 5% albumin for hemodynamic support.  TPN is a 48 with a creatinine of

1.9.  The serum cortisols at 13.  No other significant issues for now.  She is 

resting comfortably in bed.  No signs of any encephalopathy.  As stated cardiac 

rhythm is sinus and her LV ejection fraction is 55-60%.





This patient is being seen in follow-up on 03/15/2020.  The patient is laying 

down comfortably in bed.  No significant complaints.  The ongoing hypotension is

still a concern.  The patient has been requiring pressors at the higher dose of 

norepinephrine infusion at 20 g per KG per minute.  The patient is also having 

a lower urine output.  She has been in a positive fluid balance of at least 7-8 

L over the past 48 hours.  She was given IV albumin on multiple occasions in the

urine output remains low.  Her creatinine is up to 1.9.  She also has developed 

a non-anion gap metabolic acidosis.  Based on that, I requested a nephrology 

consultation.  She was on empiric antibiotic coverage with IV Rocephin.  There 

is being continued for now.  Based on the ongoing changes, I started the patient

on bicarb drip.  I give the patient himself sodium bicarb when I requested the 

patient to be seen by nephrology.  A triple lumen catheter be established today.

 She remains normal sinus rhythm.  No fever.  No chills.  No abdominal 

distention.  No nausea.  No vomiting.  No diarrhea.  No abdominal pain.  No 

other significant events overnight.  Note that the patient has a lactic acid 

level from today of 1.7.  ProBNP level is 5250.  AST and ALT are both within 

normal limits and ammonia level is at 158.  Serum cortisols at 18.





Objective





- Vital Signs


Vital signs: 


                                   Vital Signs











Temp  98.6 F   03/15/20 08:00


 


Pulse  123 H  03/15/20 10:30


 


Resp  18   03/15/20 10:30


 


BP  113/48   03/15/20 10:30


 


Pulse Ox  96   03/15/20 10:30








                                 Intake & Output











 03/14/20 03/15/20 03/15/20





 18:59 06:59 18:59


 


Intake Total 2684.652 1888.174 780.694


 


Output Total 720 640 75


 


Balance 0131.216 7880.174 705.694


 


Weight  93.6 kg 


 


Intake:   


 


   900 275


 


    Sodium Chloride 0.9% 1, 900 900 225





    000 ml @ 75 mls/hr IV .   





    [id number] KHUSHI Rx#:[id number]   


 


    cefTRIAXone 1 gm In 50  50





    Sodium Chloride 0.9% 50   





    ml @ 100 mls/hr IVPB   





    Q24HR KHUSHI Rx#:[id number]   


 


  Intake, IV Titration 1094.652 988.174 385.694





  Amount   


 


    Albumin Human 5% 250 ml  250 





    In Empty Bag 1 bag @ 250   





    mls/hr IVPB ONCE ONE Rx#:   





    [id number]   


 


    Albumin Human 5% 250 ml 250  





    In Empty Bag 1 bag @ 250   





    mls/hr IVPB ONCE ONE Rx#:   





    [id number]   


 


    Heparin Sod,Pork in 0.45% 152.105 90.363 





    NaCl 25,000 unit In 0.45   





    % NaCl 1 250ml.bag @ 12   





    UNITS/KG/HR 9.998 mls/hr   





    IV .Q24H KHUSHI Rx#:   





    [id number]   


 


    Insulin Regular 100 unit 5.717 73.252 26.765





    In Sodium Chloride 0.9%   





    100 ml @ Per Protocol IV   





    .Q0M KHUSHI Rx#:[id number]   


 


    Norepinephrine 4 mg In 686.830 574.559 358.929





    Sodium Chloride 0.9% 250   





    ml @ 0.05 MCG/KG/MIN 15.   





    872 mls/hr IV .Q16H1M KHUSHI   





    Rx#:[id number]   


 


  Oral 640  120


 


Output:   


 


  Urine 720 640 75


 


Other:   


 


  Voiding Method Indwelling Catheter Indwelling Catheter Indwelling Catheter


 


  # Voids 2  








                       ABP, PAP, CO, CI - Last Documented











Arterial Blood Pressure        114/53

















- Exam








GENERAL EXAM: Alert, pleasant 79 yold female, on 2 L nasal cannula, comfortable 

in no apparent distress.


HEAD: Normocephalic.


EYES: Normal reaction of pupils, equal size.


NOSE: Clear with pink turbinates.


THROAT: No erythema or exudates.


NECK: No masses, no JVD.


CHEST: No chest wall deformity.


LUNGS: Equal air entry with crackles in the bases.


CVS: S1 and S2 normal with no audible murmur, regular rhythm.


ABDOMEN: Soft, normal bowel sounds, no guarding or rigidity.


SPINE: No scoliosis or deformity


SKIN: No rashes


CENTRAL NERVOUS SYSTEM: No focal deficits, tone is normal in all 4 extremities.


EXTREMITIES: There is no peripheral edema. 





- Labs


CBC & Chem 7: 


                                 03/15/20 04:35





                                 03/15/20 04:35


Labs: 


                  Abnormal Lab Results - Last 24 Hours (Table)











  03/14/20 03/14/20 03/14/20 Range/Units





  11:54 16:20 16:38 


 


RBC     (3.80-5.40)  m/uL


 


Hgb     (11.4-16.0)  gm/dL


 


Hct     (34.0-46.0)  %


 


MCHC     (31.0-37.0)  g/dL


 


RDW     (11.5-15.5)  %


 


Plt Count     (150-450)  k/uL


 


Lymphocytes #     (1.0-4.8)  k/uL


 


APTT   180.1 H*   (22.0-30.0)  sec


 


Sodium     (137-145)  mmol/L


 


Chloride     ()  mmol/L


 


Carbon Dioxide     (22-30)  mmol/L


 


BUN     (7-17)  mg/dL


 


Creatinine     (0.52-1.04)  mg/dL


 


Glucose     (74-99)  mg/dL


 


POC Glucose (mg/dL)  336 H   318 H  (75-99)  mg/dL


 


Calcium     (8.4-10.2)  mg/dL


 


Ammonia     (<30)  umol/L














  03/14/20 03/14/20 03/14/20 Range/Units





  17:20 17:25 18:36 


 


RBC     (3.80-5.40)  m/uL


 


Hgb     (11.4-16.0)  gm/dL


 


Hct     (34.0-46.0)  %


 


MCHC     (31.0-37.0)  g/dL


 


RDW     (11.5-15.5)  %


 


Plt Count     (150-450)  k/uL


 


Lymphocytes #     (1.0-4.8)  k/uL


 


APTT   62.4 H   (22.0-30.0)  sec


 


Sodium     (137-145)  mmol/L


 


Chloride     ()  mmol/L


 


Carbon Dioxide     (22-30)  mmol/L


 


BUN     (7-17)  mg/dL


 


Creatinine     (0.52-1.04)  mg/dL


 


Glucose     (74-99)  mg/dL


 


POC Glucose (mg/dL)  310 H   290 H  (75-99)  mg/dL


 


Calcium     (8.4-10.2)  mg/dL


 


Ammonia     (<30)  umol/L














  03/14/20 03/14/20 03/14/20 Range/Units





  19:04 20:02 21:04 


 


RBC     (3.80-5.40)  m/uL


 


Hgb     (11.4-16.0)  gm/dL


 


Hct     (34.0-46.0)  %


 


MCHC     (31.0-37.0)  g/dL


 


RDW     (11.5-15.5)  %


 


Plt Count     (150-450)  k/uL


 


Lymphocytes #     (1.0-4.8)  k/uL


 


APTT     (22.0-30.0)  sec


 


Sodium     (137-145)  mmol/L


 


Chloride     ()  mmol/L


 


Carbon Dioxide     (22-30)  mmol/L


 


BUN     (7-17)  mg/dL


 


Creatinine     (0.52-1.04)  mg/dL


 


Glucose     (74-99)  mg/dL


 


POC Glucose (mg/dL)  279 H  272 H  229 H  (75-99)  mg/dL


 


Calcium     (8.4-10.2)  mg/dL


 


Ammonia     (<30)  umol/L














  03/14/20 03/14/20 03/14/20 Range/Units





  22:03 22:56 23:54 


 


RBC     (3.80-5.40)  m/uL


 


Hgb     (11.4-16.0)  gm/dL


 


Hct     (34.0-46.0)  %


 


MCHC     (31.0-37.0)  g/dL


 


RDW     (11.5-15.5)  %


 


Plt Count     (150-450)  k/uL


 


Lymphocytes #     (1.0-4.8)  k/uL


 


APTT     (22.0-30.0)  sec


 


Sodium     (137-145)  mmol/L


 


Chloride     ()  mmol/L


 


Carbon Dioxide     (22-30)  mmol/L


 


BUN     (7-17)  mg/dL


 


Creatinine     (0.52-1.04)  mg/dL


 


Glucose     (74-99)  mg/dL


 


POC Glucose (mg/dL)  187 H  224 H  191 H  (75-99)  mg/dL


 


Calcium     (8.4-10.2)  mg/dL


 


Ammonia     (<30)  umol/L














  03/15/20 03/15/20 03/15/20 Range/Units





  01:10 01:57 03:01 


 


RBC     (3.80-5.40)  m/uL


 


Hgb     (11.4-16.0)  gm/dL


 


Hct     (34.0-46.0)  %


 


MCHC     (31.0-37.0)  g/dL


 


RDW     (11.5-15.5)  %


 


Plt Count     (150-450)  k/uL


 


Lymphocytes #     (1.0-4.8)  k/uL


 


APTT     (22.0-30.0)  sec


 


Sodium     (137-145)  mmol/L


 


Chloride     ()  mmol/L


 


Carbon Dioxide     (22-30)  mmol/L


 


BUN     (7-17)  mg/dL


 


Creatinine     (0.52-1.04)  mg/dL


 


Glucose     (74-99)  mg/dL


 


POC Glucose (mg/dL)  168 H  171 H  181 H  (75-99)  mg/dL


 


Calcium     (8.4-10.2)  mg/dL


 


Ammonia     (<30)  umol/L














  03/15/20 03/15/20 03/15/20 Range/Units





  03:50 04:35 04:35 


 


RBC   3.60 L   (3.80-5.40)  m/uL


 


Hgb   9.1 L   (11.4-16.0)  gm/dL


 


Hct   29.9 L   (34.0-46.0)  %


 


MCHC   30.3 L   (31.0-37.0)  g/dL


 


RDW   16.7 H   (11.5-15.5)  %


 


Plt Count   102 L   (150-450)  k/uL


 


Lymphocytes #   0.9 L   (1.0-4.8)  k/uL


 


APTT     (22.0-30.0)  sec


 


Sodium    134 L  (137-145)  mmol/L


 


Chloride    111 H  ()  mmol/L


 


Carbon Dioxide    15 L  (22-30)  mmol/L


 


BUN    47 H  (7-17)  mg/dL


 


Creatinine    1.98 H  (0.52-1.04)  mg/dL


 


Glucose    174 H  (74-99)  mg/dL


 


POC Glucose (mg/dL)  168 H    (75-99)  mg/dL


 


Calcium    8.1 L  (8.4-10.2)  mg/dL


 


Ammonia     (<30)  umol/L














  03/15/20 03/15/20 03/15/20 Range/Units





  04:35 04:53 06:08 


 


RBC     (3.80-5.40)  m/uL


 


Hgb     (11.4-16.0)  gm/dL


 


Hct     (34.0-46.0)  %


 


MCHC     (31.0-37.0)  g/dL


 


RDW     (11.5-15.5)  %


 


Plt Count     (150-450)  k/uL


 


Lymphocytes #     (1.0-4.8)  k/uL


 


APTT  60.1 H    (22.0-30.0)  sec


 


Sodium     (137-145)  mmol/L


 


Chloride     ()  mmol/L


 


Carbon Dioxide     (22-30)  mmol/L


 


BUN     (7-17)  mg/dL


 


Creatinine     (0.52-1.04)  mg/dL


 


Glucose     (74-99)  mg/dL


 


POC Glucose (mg/dL)   166 H  186 H  (75-99)  mg/dL


 


Calcium     (8.4-10.2)  mg/dL


 


Ammonia     (<30)  umol/L














  03/15/20 03/15/20 03/15/20 Range/Units





  07:10 07:59 08:45 


 


RBC     (3.80-5.40)  m/uL


 


Hgb     (11.4-16.0)  gm/dL


 


Hct     (34.0-46.0)  %


 


MCHC     (31.0-37.0)  g/dL


 


RDW     (11.5-15.5)  %


 


Plt Count     (150-450)  k/uL


 


Lymphocytes #     (1.0-4.8)  k/uL


 


APTT     (22.0-30.0)  sec


 


Sodium     (137-145)  mmol/L


 


Chloride     ()  mmol/L


 


Carbon Dioxide     (22-30)  mmol/L


 


BUN     (7-17)  mg/dL


 


Creatinine     (0.52-1.04)  mg/dL


 


Glucose     (74-99)  mg/dL


 


POC Glucose (mg/dL)  182 H  178 H   (75-99)  mg/dL


 


Calcium     (8.4-10.2)  mg/dL


 


Ammonia    158 H  (<30)  umol/L














  03/15/20 03/15/20 Range/Units





  08:47 10:54 


 


RBC    (3.80-5.40)  m/uL


 


Hgb    (11.4-16.0)  gm/dL


 


Hct    (34.0-46.0)  %


 


MCHC    (31.0-37.0)  g/dL


 


RDW    (11.5-15.5)  %


 


Plt Count    (150-450)  k/uL


 


Lymphocytes #    (1.0-4.8)  k/uL


 


APTT    (22.0-30.0)  sec


 


Sodium    (137-145)  mmol/L


 


Chloride    ()  mmol/L


 


Carbon Dioxide    (22-30)  mmol/L


 


BUN    (7-17)  mg/dL


 


Creatinine    (0.52-1.04)  mg/dL


 


Glucose    (74-99)  mg/dL


 


POC Glucose (mg/dL)  179 H  137 H  (75-99)  mg/dL


 


Calcium    (8.4-10.2)  mg/dL


 


Ammonia    (<30)  umol/L














Assessment and Plan


Plan: 











1 acute hypotension  in a patient remains pressor dependent.  Lactic acid level 

is nonelevated.  White cell count is at 6.9.  Cortisol level is within normal 

limits.  Cultures of been all negative.  Echocardiogram at shown a preserved LV 

function.  As such, the cause of her hypotension is not clear to me at this 

point in time.  No evidence of any pericardial effusion.  No significant 

ascites.  No significant pulmonary hypertension.


2 nonalcoholic liver cirrhosis and the patient is status post large volume 

paracentesis last paracentesis being done 03/10/2020


3 paroxysmal atrial fibrillation currently in normal sinus rhythm and the 

patient is on IV heparin 


4 positive troponins.  An underlying positive stress test about underlying 

coronary artery disease


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 

coagulopathy


6 diabetes mellitus type 2


7 peripheral neuropathy


8 history of breast cancer with a previous lumpectomy on the left


9 mild thrombocytopenia


10 acute kidney injury, probably related to hypotension/large volume paracent

esis.  Consider prerenal azotemia.  Rule out underlying hepatorenal syndrome.  

The patient continues to be hypotensive which is probably not helping with her 

acute kidney injury.  The patient is pressor dependent.  I asked nephrology to 

evaluate the patient and the patient was getting into a volume overload 

situation.  We have been giving her IV albumin.  Nephrology saw the patient and 

she'll be given boluses of half a liter saline over 2 hours and she'll be 

monitored.  Urine sodium and creatinine is still pending for now.  Urine 

cultures are also pending for now.  Lactic acid level was nonelevated.


11 fluids retention with a component of non-anion gap metabolic acidosis and the

patient will be started on sodium bicarb infusion





Plan





We'll try to achieve a lower norepinephrine infusion rate while maintaining a 

mean arterial pressure above 65


Continue pressors


We'll insert a triple lumen catheter and will monitor the CVP.


Continue IV heparin 


Monitor the renal function.  Check the urine sodium and creatinine


Put the patient on lactulose 10 mL on a daily basis orally


Continue the Florinef


Put the patient a bicarb drip


We'll continue to follow.  The patient be kept in ICU as long as she is pressor 

dependent.

## 2020-03-15 NOTE — P.NPCON
History of Present Illness





- Reason for Consult


Consult date: 03/15/20


acute renal failure





- Chief Complaint


Cirrhosis with acute kidney injury





- History of Present Illness





This is 79-year-old female seen in consultation because of acute kidney injury 

and chronic kidney disease.


Her creatinine on admission on 03/09/2020 was 1.04.  She had a ascites tap done 

on 03/10/2020, creatinine the next day was 1.43, dated 03/11/2020.  Urinalysis 

showed on 03/11/2020 showed a negative protein small blood and large leukocyte 

esterase with 31 WBCs and moderate bacteria although she was asymptomatic.  She 

does have Lobo catheter





 Her blood pressure post-tap was stable for a few hours but the day after the 

procedure on 03/11/2020 been down to the 70s and 80s for a few hours.  She 

maintains her urine output but the last few hours has been little less than 

before he did have urine output is in the range of 700 mL to 1 L per day


She known with nonalcoholic cirrhosis, presented with the chest discomfort 

abdominal distention and shortness of breath.  Cardiology has seen her and her 

troponin was slightly high at 0.5 a stress test was performed and no diagnosis 

of acute MI has been made yet.  Her chest x-ray shows some perihilar infiltrate 

and possibility of CHF.  She is currently on levo fed and IV normal saline.





Urine output has been maintained around 700 to a liter range per 24 hours.  Her 

blood pressures and vital signs are stable on levo fed.  She is awake alert 

oriented comfortable warm to touch.





She is known with diabetes mellitus, history of breast cancer on the left 

psoriasis hypertension atrial fibrillation and remains in atrial fibrillation 

currently.  Her previous tap was in February.





Currently She denies any chest pain, has a minimal cough.  No abdominal pain no 

dysuria frequency.  No fever chills.





Past Medical History


Past Medical History: Cancer, Diabetes Mellitus, Hypertension, Liver Disease


Additional Past Medical History / Comment(s): Pt recently admitted to [facility] on 2/ 19/20 with symptomatic ascities/had paracentesis 5 L removed, thrombocytopenia 

2ndary to cirrhosis/splenic sequestration and portal HTN.        Other:  

Nonalcoholic cirrhosis-thought d/t rx, ascities/paracentesis, L breast cancer 

with lumpectomy, NIDDM type II, neuropathy bilateral legs/feet,


History of Any Multi-Drug Resistant Organisms: None Reported


Past Surgical History: Breast Surgery, Cholecystectomy


Additional Past Surgical History / Comment(s): L breast lumpectomy for cancer, 

right breast lump removal-benign, paracentesis-several,


Past Anesthesia/Blood Transfusion Reactions: No Reported Reaction


Smoking Status: Never smoker





- Past Family History


  ** Father


Family Medical History: Cancer


Additional Family Medical History / Comment(s): Colon cancer





  ** Brother(s)


Family Medical History: Myocardial Infarction (MI)


Additional Family Medical History / Comment(s): One brother with stents, another

brother passed away from an MI





  ** Mother


Family Medical History: Neurologic Disorder


Additional Family Medical History / Comment(s): Parkinsons





Medications and Allergies


                                Home Medications











 Medication  Instructions  Recorded  Confirmed  Type


 


Cholecalciferol (Vitamin D3) 2,000 unit PO DAILY 11/09/19 03/10/20 History





[Vitamin D3]    


 


Pioglitazone [Actos] 30 mg PO DAILY 11/09/19 03/10/20 History


 


Sertraline [Zoloft] 50 mg PO DAILY 11/09/19 03/10/20 History


 


sitaGLIPtin PHOSPHATE [Januvia] 100 mg PO DAILY 11/09/19 03/10/20 History


 


Cyanocobalamin [Vitamin B-12] 1,000 mcg PO DAILY #60 tab 11/12/19 03/10/20 Rx


 


Furosemide [Lasix] 40 mg PO BID #30 tablet 02/20/20 03/10/20 Rx


 


Lisinopril [Zestril] 5 mg PO DAILY #0 02/20/20 03/10/20 Rx


 


Niacin 2,000 mg PO DAILY 03/10/20 03/10/20 History


 


Spironolactone 50 mg PO DAILY 03/10/20 03/10/20 History








                                    Allergies











Allergy/AdvReac Type Severity Reaction Status Date / Time


 


No Known Allergies Allergy   Verified 03/10/20 10:16














Physical Exam


Vitals: 


                                   Vital Signs











  Temp Pulse Resp BP Pulse Ox


 


 03/15/20 07:00   81  16   96


 


 03/15/20 06:30   80  19   96


 


 03/15/20 06:00   79  18   96


 


 03/15/20 05:30   80  18   95


 


 03/15/20 05:00   80  20  113/48  96


 


 03/15/20 04:30   78  18   96


 


 03/15/20 04:00  98.2 F  74  19   97


 


 03/15/20 03:30   74  19   97


 


 03/15/20 03:00   71  20   97


 


 03/15/20 02:30   72  19   98


 


 03/15/20 02:00   73  20   96


 


 03/15/20 01:30   71  18   98


 


 03/15/20 01:00   68  17   99


 


 03/15/20 00:30   66  16   99


 


 03/15/20 00:14   65  16   100


 


 03/15/20 00:00  98 F  66  17   100


 


 03/14/20 23:30   66  18   100


 


 03/14/20 23:00   66  16   100


 


 03/14/20 22:30   65  17   100


 


 03/14/20 22:00   67  20   99


 


 03/14/20 21:30   67  18   99


 


 03/14/20 21:00   67  17   100


 


 03/14/20 20:30   70  19  


 


 03/14/20 20:00  98.7 F  70  17   99


 


 03/14/20 19:30   68  13  113/48  100


 


 03/14/20 19:00   68  16   99


 


 03/14/20 18:00   63  23   98


 


 03/14/20 17:00   62  16   99


 


 03/14/20 16:00  98.0 F  62  19   96


 


 03/14/20 15:00   60  20   98


 


 03/14/20 14:00   61  16   100


 


 03/14/20 13:00   60  14  


 


 03/14/20 12:00  98.2 F  60  18  113/48  97


 


 03/14/20 11:00   65  15  113/48  97


 


 03/14/20 10:00   71  31 H  113/48  97








                                Intake and Output











 03/14/20 03/15/20 03/15/20





 22:59 06:59 14:59


 


Intake Total 2007.217 1007.609 335.254


 


Output Total 560 300 25


 


Balance 1447.217 707.609 310.254


 


Intake:   


 


   525 75


 


    Sodium Chloride 0.9% 1, 675 525 75





    000 ml @ 75 mls/hr IV .   





    [id number] [facility] Rx#:[id number]   


 


  Intake, IV Titration 1092.217 482.609 260.254





  Amount   


 


    Albumin Human 5% 250 ml 250  





    In Empty Bag 1 bag @ 250   





    mls/hr IVPB ONCE ONE Rx#:   





    [id number]   


 


    Albumin Human 5% 250 ml 250  





    In Empty Bag 1 bag @ 250   





    mls/hr IVPB ONCE ONE Rx#:   





    [id number]   


 


    Heparin Sod,Pork in 0.45% 152.105 90.363 





    NaCl 25,000 unit In 0.45   





    % NaCl 1 250ml.bag @ 12   





    UNITS/KG/HR 9.998 mls/hr   





    IV .Q24H [facility] Rx#:   





    [id number]   


 


    Insulin Regular 100 unit 38.114 40.855 16.665





    In Sodium Chloride 0.9%   





    100 ml @ Per Protocol IV   





    .Q0M [facility] Rx#:[id number]   


 


    Norepinephrine 4 mg In 401.998 351.391 243.589





    Sodium Chloride 0.9% 250   





    ml @ 0.05 MCG/KG/MIN 15.   





    872 mls/hr IV .Q16H1M [facility]   





    Rx#:[id number]   


 


  Oral 240  


 


Output:   


 


  Urine 560 300 25


 


Other:   


 


  Voiding Method Indwelling Catheter Indwelling Catheter 


 


  Weight  93.6 kg 








                         ABP, PAP, CO, CI - Last 8 Hours











Arterial Blood Pressure        117/46


 


Arterial Blood Pressure        113/46


 


Arterial Blood Pressure        109/45


 


Arterial Blood Pressure        114/46


 


Arterial Blood Pressure        119/46


 


Arterial Blood Pressure        118/46


 


Arterial Blood Pressure        114/42


 


Arterial Blood Pressure        118/44


 


Arterial Blood Pressure        108/41


 


Arterial Blood Pressure        103/42


 


Arterial Blood Pressure        119/41


 


Arterial Blood Pressure        114/37











On examination she is awake alert oriented comfortable





She is on levo fed currently





HEENT exam no JVP neck is supple no facial asymmetry should not jaundice 

clinically





Heart sounds are unremarkable except for atrial fibrillation





Lungs are clear to auscultation, good air entry bilaterally.





Abdomen is soft nontender, no organomegaly ascites noted currently clinically





Extremity exam was trace edema here





Neurologically awake alert oriented 











Results





- Lab Results


                             Most recent lab results











Calcium  8.1 mg/dL (8.4-10.2)  L  03/15/20  04:35    


 


Magnesium  2.0 mg/dL (1.6-2.3)   03/13/20  04:55    














                                 03/15/20 04:35





                                 03/15/20 04:35





Assessment and Plan


Assessment: 





Impression





1.  Acute kidney injury secondary to low blood pressure in the 70s on 

03/11/2020.  Cause of this low blood pressure is not clear.  It happened a few 

hours after it 3.2 L She may possibly explain it still.  Troponin was slightly 

high at 0.5 but cardiology does not feel she had an MI.  No clear-cut evidence 

of sepsis except a chest x-ray shows some perihilar infiltrate.  Although it is 

read as CHF acting it's more likely not as in spite of being given IV fluid she 

is not deteriorating.





2.  Hypotension cause not very clear normal TSH and cortisol.  Echocardiogram 

does not show any pericardial tap and not or effusion and ejection fraction is 

maintained there is mild pulmonary hypertension with a right ventricular 

pressure of 43.  Rule out sepsis





3.  Pyuria rule out acute interstitial nephritis although doubt it.





3.  Possibility of hepatorenal syndrome is still considered although unlikely 

given normal liver function tests, bilirubin is 1.3, but the ammonia level is 

significantly elevated at 158 although clinically she is not in encephalopathy.





4.  New onset of metabolic acidosis with non-gap.  This is from acute kidney 

injury.





5.  Rule out sepsis as a cause of the low blood pressure





Recommendation





1.  Agree with the IV fluids.  We'll give a bolus of 500 mL over 2 hours, and 

watch how she does careful monitoring for discharge failure





2.  Check BNP.





3.  Check urine sodium and creatinine.





4.  Check urinalysis again and a urine culture





5.  Check lactic acid and cover for any possible pneumonia.





6.  If she does not respond we may have to treat her as hepatorenal syndrome in 

the next few hours.





7.  Treat acidosis she can be started on by mouth bicarb 650 4 times a day

## 2020-03-16 LAB
ANION GAP SERPL CALC-SCNC: 7 MMOL/L
BASOPHILS # BLD AUTO: 0 K/UL (ref 0–0.2)
BASOPHILS NFR BLD AUTO: 1 %
BUN SERPL-SCNC: 43 MG/DL (ref 7–17)
CALCIUM SPEC-MCNC: 8.1 MG/DL (ref 8.4–10.2)
CHLORIDE SERPL-SCNC: 108 MMOL/L (ref 98–107)
CO2 SERPL-SCNC: 21 MMOL/L (ref 22–30)
EOSINOPHIL # BLD AUTO: 0.1 K/UL (ref 0–0.7)
EOSINOPHIL NFR BLD AUTO: 2 %
ERYTHROCYTE [DISTWIDTH] IN BLOOD BY AUTOMATED COUNT: 3.53 M/UL (ref 3.8–5.4)
ERYTHROCYTE [DISTWIDTH] IN BLOOD: 17.5 % (ref 11.5–15.5)
GLUCOSE BLD-MCNC: 113 MG/DL (ref 75–99)
GLUCOSE BLD-MCNC: 119 MG/DL (ref 75–99)
GLUCOSE BLD-MCNC: 159 MG/DL (ref 75–99)
GLUCOSE BLD-MCNC: 162 MG/DL (ref 75–99)
GLUCOSE BLD-MCNC: 165 MG/DL (ref 75–99)
GLUCOSE BLD-MCNC: 165 MG/DL (ref 75–99)
GLUCOSE BLD-MCNC: 166 MG/DL (ref 75–99)
GLUCOSE BLD-MCNC: 174 MG/DL (ref 75–99)
GLUCOSE BLD-MCNC: 175 MG/DL (ref 75–99)
GLUCOSE BLD-MCNC: 175 MG/DL (ref 75–99)
GLUCOSE BLD-MCNC: 177 MG/DL (ref 75–99)
GLUCOSE BLD-MCNC: 181 MG/DL (ref 75–99)
GLUCOSE BLD-MCNC: 203 MG/DL (ref 75–99)
GLUCOSE BLD-MCNC: 239 MG/DL (ref 75–99)
GLUCOSE BLD-MCNC: 242 MG/DL (ref 75–99)
GLUCOSE SERPL-MCNC: 166 MG/DL (ref 74–99)
HCT VFR BLD AUTO: 29 % (ref 34–46)
HGB BLD-MCNC: 9 GM/DL (ref 11.4–16)
LYMPHOCYTES # SPEC AUTO: 0.5 K/UL (ref 1–4.8)
LYMPHOCYTES NFR SPEC AUTO: 12 %
MCH RBC QN AUTO: 25.5 PG (ref 25–35)
MCHC RBC AUTO-ENTMCNC: 31 G/DL (ref 31–37)
MCV RBC AUTO: 82.2 FL (ref 80–100)
MONOCYTES # BLD AUTO: 0.3 K/UL (ref 0–1)
MONOCYTES NFR BLD AUTO: 7 %
NEUTROPHILS # BLD AUTO: 3.6 K/UL (ref 1.3–7.7)
NEUTROPHILS NFR BLD AUTO: 77 %
PLATELET # BLD AUTO: 68 K/UL (ref 150–450)
POTASSIUM SERPL-SCNC: 3.6 MMOL/L (ref 3.5–5.1)
SODIUM SERPL-SCNC: 136 MMOL/L (ref 137–145)
WBC # BLD AUTO: 4.7 K/UL (ref 3.8–10.6)

## 2020-03-16 RX ADMIN — INSULIN ASPART SCH UNIT: 100 INJECTION, SOLUTION INTRAVENOUS; SUBCUTANEOUS at 20:30

## 2020-03-16 RX ADMIN — METOPROLOL TARTRATE SCH MG: 25 TABLET, FILM COATED ORAL at 20:32

## 2020-03-16 RX ADMIN — METOPROLOL TARTRATE SCH MG: 25 TABLET, FILM COATED ORAL at 08:04

## 2020-03-16 RX ADMIN — FLUDROCORTISONE ACETATE SCH MG: 0.1 TABLET ORAL at 08:03

## 2020-03-16 RX ADMIN — INSULIN HUMAN SCH MLS/HR: 100 INJECTION, SOLUTION PARENTERAL at 13:02

## 2020-03-16 RX ADMIN — APIXABAN SCH MG: 5 TABLET, FILM COATED ORAL at 20:24

## 2020-03-16 RX ADMIN — PANTOPRAZOLE SODIUM SCH MG: 40 INJECTION, POWDER, FOR SOLUTION INTRAVENOUS at 08:04

## 2020-03-16 RX ADMIN — Medication SCH UNIT: at 08:03

## 2020-03-16 RX ADMIN — GABAPENTIN SCH MG: 100 CAPSULE ORAL at 20:24

## 2020-03-16 RX ADMIN — POTASSIUM CHLORIDE SCH MLS/HR: 14.9 INJECTION, SOLUTION INTRAVENOUS at 01:54

## 2020-03-16 RX ADMIN — Medication SCH MG: at 14:00

## 2020-03-16 RX ADMIN — GABAPENTIN SCH MG: 100 CAPSULE ORAL at 17:22

## 2020-03-16 RX ADMIN — Medication SCH MG: at 17:24

## 2020-03-16 RX ADMIN — SPIRONOLACTONE SCH MG: 25 TABLET, FILM COATED ORAL at 08:03

## 2020-03-16 RX ADMIN — LACTULOSE SCH GM: 20 SOLUTION ORAL at 08:04

## 2020-03-16 RX ADMIN — Medication SCH MG: at 08:07

## 2020-03-16 RX ADMIN — ASPIRIN 81 MG CHEWABLE TABLET SCH MG: 81 TABLET CHEWABLE at 08:03

## 2020-03-16 RX ADMIN — CYANOCOBALAMIN TAB 500 MCG SCH MCG: 500 TAB at 08:03

## 2020-03-16 RX ADMIN — PIOGLITAZONE SCH MG: 30 TABLET ORAL at 08:05

## 2020-03-16 RX ADMIN — FLUDROCORTISONE ACETATE SCH MG: 0.1 TABLET ORAL at 20:24

## 2020-03-16 RX ADMIN — SERTRALINE HYDROCHLORIDE SCH MG: 50 TABLET, FILM COATED ORAL at 08:05

## 2020-03-16 RX ADMIN — GABAPENTIN SCH MG: 100 CAPSULE ORAL at 08:04

## 2020-03-16 RX ADMIN — INSULIN ASPART SCH UNIT: 100 INJECTION, SOLUTION INTRAVENOUS; SUBCUTANEOUS at 16:45

## 2020-03-16 RX ADMIN — Medication SCH MG: at 08:05

## 2020-03-16 RX ADMIN — CEFAZOLIN SCH: 330 INJECTION, POWDER, FOR SOLUTION INTRAMUSCULAR; INTRAVENOUS at 09:39

## 2020-03-16 RX ADMIN — Medication SCH MG: at 20:24

## 2020-03-16 RX ADMIN — INSULIN DETEMIR SCH UNIT: 100 INJECTION, SOLUTION SUBCUTANEOUS at 20:25

## 2020-03-16 NOTE — P.PN
Subjective





Patient is seen in follow for acute kidney injury.  Renal function is improved. 

She is currently maintained on normal saline at 75 mL an hour.  Urine output 

about 40-50 mL an hour.  Not on vasopressors at this time.





Vital signs are stable.


General: The patient appeared well nourished and normally developed. 


HEENT: Head exam is unremarkable. Neck is without jugular venous distension.


LUNGS: Lungs are clear to auscultation and percussion. Breath sounds decreased.


HEART: Rate and Rhythm are regular. First and second heart sounds normal. No mu

rmurs, rubs or gallops. 


ABDOMEN: Bowel sounds present.  Nontender.


EXTREMITITES: Trace edema.





Objective





- Vital Signs


Vital signs: 


                                   Vital Signs











Temp  97.9 F   03/16/20 04:00


 


Pulse  68   03/16/20 07:00


 


Resp  13   03/16/20 07:00


 


BP  113/48   03/15/20 21:30


 


Pulse Ox  97   03/16/20 07:00








                                 Intake & Output











 03/15/20 03/16/20 03/16/20





 18:59 06:59 18:59


 


Intake Total 2744.942 1365.216 153.41


 


Output Total 450 515 45


 


Balance 2294.942 850.216 108.41


 


Weight  96.5 kg 


 


Intake:   


 


   825 75


 


    Dextrose 5% in Water 1,  825 75





    000 ml @ 75 mls/hr IV .   





    [id number] [facility] with Sodium   





    Bicarb (1 Meq/ml) 150 ml   





    Rx#:[id number]   


 


    Sodium Chloride 0.9% 1, 225  





    000 ml @ 75 mls/hr IV .   





    [id number] [facility] Rx#:[id number]   


 


    cefTRIAXone 1 gm In 50  





    Sodium Chloride 0.9% 50   





    ml @ 100 mls/hr IVPB   





    Q24HR [facility] Rx#:[id number]   


 


  Intake, IV Titration 1989.942 290.216 78.41





  Amount   


 


    Dextrose 5% in Water 1, 675 75 





    000 ml @ 75 mls/hr IV .   





    [id number] [facility] with Sodium   





    Bicarb (1 Meq/ml) 150 ml   





    Rx#:[id number]   


 


    Insulin Regular 100 unit 63.353 62.208 





    In Sodium Chloride 0.9%   





    100 ml @ Per Protocol IV   





    .Q0M [facility] Rx#:[id number]   


 


    Norepinephrine 4 mg In 751.589 153.008 78.41





    Sodium Chloride 0.9% 250   





    ml @ 0.05 MCG/KG/MIN 15.   





    872 mls/hr IV .Q16H1M [facility]   





    Rx#:[id number]   


 


    Sodium Chloride 0.9% 1, 500  





    000 ml @ 500 mls/hr IV .   





    Q2H ONE Rx#:[id number]   


 


  Oral 480 250 


 


Output:   


 


  Urine 450 515 45


 


Other:   


 


  Voiding Method Indwelling Catheter Indwelling Catheter 


 


  # Bowel Movements  1 








                       ABP, PAP, CO, CI - Last Documented











Arterial Blood Pressure        145/44

















- Labs


CBC & Chem 7: 


                                 03/16/20 05:20





                                 03/16/20 05:20


Labs: 


                  Abnormal Lab Results - Last 24 Hours (Table)











  03/15/20 03/15/20 03/15/20 Range/Units





  08:45 08:47 10:54 


 


RBC     (3.80-5.40)  m/uL


 


Hgb     (11.4-16.0)  gm/dL


 


Hct     (34.0-46.0)  %


 


RDW     (11.5-15.5)  %


 


Plt Count     (150-450)  k/uL


 


Lymphocytes #     (1.0-4.8)  k/uL


 


APTT     (22.0-30.0)  sec


 


Sodium     (137-145)  mmol/L


 


Chloride     ()  mmol/L


 


Carbon Dioxide     (22-30)  mmol/L


 


BUN     (7-17)  mg/dL


 


Creatinine     (0.52-1.04)  mg/dL


 


Glucose     (74-99)  mg/dL


 


POC Glucose (mg/dL)   179 H  137 H  (75-99)  mg/dL


 


Calcium     (8.4-10.2)  mg/dL


 


Ammonia  158 H    (<30)  umol/L


 


Urine Protein     (Negative)  


 


Urine Ketones     (Negative)  


 


Ur Leukocyte Esterase     (Negative)  


 


Hyaline Casts     (0-2)  /lpf


 


Urine Mucus     (None)  /hpf














  03/15/20 03/15/20 03/15/20 Range/Units





  12:00 12:12 15:20 


 


RBC     (3.80-5.40)  m/uL


 


Hgb     (11.4-16.0)  gm/dL


 


Hct     (34.0-46.0)  %


 


RDW     (11.5-15.5)  %


 


Plt Count     (150-450)  k/uL


 


Lymphocytes #     (1.0-4.8)  k/uL


 


APTT     (22.0-30.0)  sec


 


Sodium     (137-145)  mmol/L


 


Chloride     ()  mmol/L


 


Carbon Dioxide     (22-30)  mmol/L


 


BUN     (7-17)  mg/dL


 


Creatinine     (0.52-1.04)  mg/dL


 


Glucose     (74-99)  mg/dL


 


POC Glucose (mg/dL)   169 H  162 H  (75-99)  mg/dL


 


Calcium     (8.4-10.2)  mg/dL


 


Ammonia     (<30)  umol/L


 


Urine Protein  1+ H    (Negative)  


 


Urine Ketones  Trace H    (Negative)  


 


Ur Leukocyte Esterase  Trace H    (Negative)  


 


Hyaline Casts  33 H    (0-2)  /lpf


 


Urine Mucus  Rare H    (None)  /hpf














  03/15/20 03/15/20 03/15/20 Range/Units





  15:57 17:57 18:53 


 


RBC     (3.80-5.40)  m/uL


 


Hgb     (11.4-16.0)  gm/dL


 


Hct     (34.0-46.0)  %


 


RDW     (11.5-15.5)  %


 


Plt Count     (150-450)  k/uL


 


Lymphocytes #     (1.0-4.8)  k/uL


 


APTT     (22.0-30.0)  sec


 


Sodium     (137-145)  mmol/L


 


Chloride     ()  mmol/L


 


Carbon Dioxide     (22-30)  mmol/L


 


BUN     (7-17)  mg/dL


 


Creatinine     (0.52-1.04)  mg/dL


 


Glucose     (74-99)  mg/dL


 


POC Glucose (mg/dL)  193 H  225 H  162 H  (75-99)  mg/dL


 


Calcium     (8.4-10.2)  mg/dL


 


Ammonia     (<30)  umol/L


 


Urine Protein     (Negative)  


 


Urine Ketones     (Negative)  


 


Ur Leukocyte Esterase     (Negative)  


 


Hyaline Casts     (0-2)  /lpf


 


Urine Mucus     (None)  /hpf














  03/15/20 03/15/20 03/15/20 Range/Units





  20:20 21:06 22:04 


 


RBC     (3.80-5.40)  m/uL


 


Hgb     (11.4-16.0)  gm/dL


 


Hct     (34.0-46.0)  %


 


RDW     (11.5-15.5)  %


 


Plt Count     (150-450)  k/uL


 


Lymphocytes #     (1.0-4.8)  k/uL


 


APTT     (22.0-30.0)  sec


 


Sodium     (137-145)  mmol/L


 


Chloride     ()  mmol/L


 


Carbon Dioxide     (22-30)  mmol/L


 


BUN     (7-17)  mg/dL


 


Creatinine     (0.52-1.04)  mg/dL


 


Glucose     (74-99)  mg/dL


 


POC Glucose (mg/dL)  193 H  215 H  197 H  (75-99)  mg/dL


 


Calcium     (8.4-10.2)  mg/dL


 


Ammonia     (<30)  umol/L


 


Urine Protein     (Negative)  


 


Urine Ketones     (Negative)  


 


Ur Leukocyte Esterase     (Negative)  


 


Hyaline Casts     (0-2)  /lpf


 


Urine Mucus     (None)  /hpf














  03/15/20 03/16/20 03/16/20 Range/Units





  22:56 00:07 00:58 


 


RBC     (3.80-5.40)  m/uL


 


Hgb     (11.4-16.0)  gm/dL


 


Hct     (34.0-46.0)  %


 


RDW     (11.5-15.5)  %


 


Plt Count     (150-450)  k/uL


 


Lymphocytes #     (1.0-4.8)  k/uL


 


APTT     (22.0-30.0)  sec


 


Sodium     (137-145)  mmol/L


 


Chloride     ()  mmol/L


 


Carbon Dioxide     (22-30)  mmol/L


 


BUN     (7-17)  mg/dL


 


Creatinine     (0.52-1.04)  mg/dL


 


Glucose     (74-99)  mg/dL


 


POC Glucose (mg/dL)  176 H  181 H  174 H  (75-99)  mg/dL


 


Calcium     (8.4-10.2)  mg/dL


 


Ammonia     (<30)  umol/L


 


Urine Protein     (Negative)  


 


Urine Ketones     (Negative)  


 


Ur Leukocyte Esterase     (Negative)  


 


Hyaline Casts     (0-2)  /lpf


 


Urine Mucus     (None)  /hpf














  03/16/20 03/16/20 03/16/20 Range/Units





  01:52 02:57 04:09 


 


RBC     (3.80-5.40)  m/uL


 


Hgb     (11.4-16.0)  gm/dL


 


Hct     (34.0-46.0)  %


 


RDW     (11.5-15.5)  %


 


Plt Count     (150-450)  k/uL


 


Lymphocytes #     (1.0-4.8)  k/uL


 


APTT     (22.0-30.0)  sec


 


Sodium     (137-145)  mmol/L


 


Chloride     ()  mmol/L


 


Carbon Dioxide     (22-30)  mmol/L


 


BUN     (7-17)  mg/dL


 


Creatinine     (0.52-1.04)  mg/dL


 


Glucose     (74-99)  mg/dL


 


POC Glucose (mg/dL)  175 H  239 H  177 H  (75-99)  mg/dL


 


Calcium     (8.4-10.2)  mg/dL


 


Ammonia     (<30)  umol/L


 


Urine Protein     (Negative)  


 


Urine Ketones     (Negative)  


 


Ur Leukocyte Esterase     (Negative)  


 


Hyaline Casts     (0-2)  /lpf


 


Urine Mucus     (None)  /hpf














  03/16/20 03/16/20 03/16/20 Range/Units





  04:53 05:20 05:20 


 


RBC   3.53 L   (3.80-5.40)  m/uL


 


Hgb   9.0 L   (11.4-16.0)  gm/dL


 


Hct   29.0 L   (34.0-46.0)  %


 


RDW   17.5 H   (11.5-15.5)  %


 


Plt Count   68 L   (150-450)  k/uL


 


Lymphocytes #   0.5 L   (1.0-4.8)  k/uL


 


APTT     (22.0-30.0)  sec


 


Sodium    136 L  (137-145)  mmol/L


 


Chloride    108 H  ()  mmol/L


 


Carbon Dioxide    21 L  (22-30)  mmol/L


 


BUN    43 H  (7-17)  mg/dL


 


Creatinine    1.52 H  (0.52-1.04)  mg/dL


 


Glucose    166 H  (74-99)  mg/dL


 


POC Glucose (mg/dL)  165 H    (75-99)  mg/dL


 


Calcium    8.1 L  (8.4-10.2)  mg/dL


 


Ammonia     (<30)  umol/L


 


Urine Protein     (Negative)  


 


Urine Ketones     (Negative)  


 


Ur Leukocyte Esterase     (Negative)  


 


Hyaline Casts     (0-2)  /lpf


 


Urine Mucus     (None)  /hpf














  03/16/20 03/16/20 03/16/20 Range/Units





  05:20 05:20 06:04 


 


RBC     (3.80-5.40)  m/uL


 


Hgb     (11.4-16.0)  gm/dL


 


Hct     (34.0-46.0)  %


 


RDW     (11.5-15.5)  %


 


Plt Count     (150-450)  k/uL


 


Lymphocytes #     (1.0-4.8)  k/uL


 


APTT   55.6 H   (22.0-30.0)  sec


 


Sodium     (137-145)  mmol/L


 


Chloride     ()  mmol/L


 


Carbon Dioxide     (22-30)  mmol/L


 


BUN     (7-17)  mg/dL


 


Creatinine     (0.52-1.04)  mg/dL


 


Glucose     (74-99)  mg/dL


 


POC Glucose (mg/dL)    165 H  (75-99)  mg/dL


 


Calcium     (8.4-10.2)  mg/dL


 


Ammonia  58 H    (<30)  umol/L


 


Urine Protein     (Negative)  


 


Urine Ketones     (Negative)  


 


Ur Leukocyte Esterase     (Negative)  


 


Hyaline Casts     (0-2)  /lpf


 


Urine Mucus     (None)  /hpf














  03/16/20 Range/Units





  06:58 


 


RBC   (3.80-5.40)  m/uL


 


Hgb   (11.4-16.0)  gm/dL


 


Hct   (34.0-46.0)  %


 


RDW   (11.5-15.5)  %


 


Plt Count   (150-450)  k/uL


 


Lymphocytes #   (1.0-4.8)  k/uL


 


APTT   (22.0-30.0)  sec


 


Sodium   (137-145)  mmol/L


 


Chloride   ()  mmol/L


 


Carbon Dioxide   (22-30)  mmol/L


 


BUN   (7-17)  mg/dL


 


Creatinine   (0.52-1.04)  mg/dL


 


Glucose   (74-99)  mg/dL


 


POC Glucose (mg/dL)  166 H  (75-99)  mg/dL


 


Calcium   (8.4-10.2)  mg/dL


 


Ammonia   (<30)  umol/L


 


Urine Protein   (Negative)  


 


Urine Ketones   (Negative)  


 


Ur Leukocyte Esterase   (Negative)  


 


Hyaline Casts   (0-2)  /lpf


 


Urine Mucus   (None)  /hpf








                      Microbiology - Last 24 Hours (Table)











 03/15/20 12:00 Urine Culture - Preliminary





 Urine,Catheterized 














Assessment and Plan


Plan: 





Assessment:


1.  Acute kidney injury secondary to ATN secondary to hypotension and 

hemodynamic instability.  Renal function better.  Creatinine 1.52 today.


2.  Metabolic acidosis secondary to acute kidney injury.  Status post bicarb 

drip.


3.  Nonalcoholic cirrhosis status post paracentesis on March 10 with 3.2 L 

drained.


4.  A. fib with RVR currently on heparin drip.





Plan:


Decrease rate of normal saline to 50 mL an hour - plan to Hep-Lock tomorrow.


Encouraged oral intake.


Avoid nephrotoxins.


Maintain oral sodium bicarbonate.


Continue Aldactone.

## 2020-03-16 NOTE — PN
PROGRESS NOTE



DATE OF SERVICE:

03/16/2020



This 79-year-old woman is admitted with ascites also had multiple other medical

problems including chest pain, acute non ST elevation myocardial infarction, 
relative

hypotension, atrial fibrillation, fast ventricular rate.  The patient is off 
Levophed

drip at this time.   maintained at 60.  The most recent chest x-ray was reviewed

personally by me showed fluid overload.  The patient is being closely monitored 
at this

time.  The patient's BNP was elevated 5250 and the most recent 2D echocardiogram
done a

few days ago showed ejection fraction about 50-60 percent and multiple valvular

abnormalities.  No chest pain.  No palpitations.



PAST MEDICAL HISTORY:

Reviewed.



REVIEW OF SYSTEMS:

Cardiovascular system:  As mentioned earlier.

RESPIRATORY: As mentioned earlier.

GI: No nausea or vomiting.

 no dysuria.

Nervous system: No numbness or weakness.



CURRENT MEDICATIONS:

Reviewed and include:

1. Tylenol p.r.n.

2. Xanax 0.5 t.i.d.

3. Eliquis 5 mg p.o. b.i.d.

4. Aspirin 81 mg daily.

5. Rocephin 1 g daily.

6. Vitamin D3 2000 daily.

7. Vitamin B12 1000 mg at bedtime.

8. Florinef 0.2 b.i.d.

9. Neurontin 100 mg p.o. t.i.d.

10.Dilaudid 0.5 q.3 p.r.n.

11.NovoLog scale.

12.Levemir 30 units subcu b.i.d.

13.Cephulac 10 mg p.o. daily.

14.Tradjenta 5 mg p.o. daily.

15.Lopressor.

16.Narcan.

17.Niacin.

18.Nitrostat.

19.Levophed.

20.Protonix.

21.Actos.

22.Zoloft.

23.Aldactone.

24.Restoril.



PHYSICAL EXAM:

Patient is alert, oriented x3.  Pulse is 66, blood pressure 123/64, respirations
16,

temp is normal.  Pulse ox 98% on room air.

HEENT: Conjunctivae normal.

NECK: No JVD.

CARDIOVASCULAR: S1, S2 muffled.

RESPIRATIONS: Breath sounds diminished in the bases, a few scattered rhonchi and

crackles.

ABDOMEN:  Soft, nontender.

LEGS:  No edema. No swelling.

CENTRAL NERVOUS SYSTEM: No focal deficits.



LABS:

WBC is 4.7, hemoglobin 9, otherwise sodium is 136, creatinine is 1.52, slightly

improved. Serum cortisol is 13 and 18.



ASSESSMENT:

1. Chest pain with troponin 0.050 possible acute non-ST-segment-elevation 
myocardial

    infarction with abnormal stress test.

2. Relative hypotension multifactorial possibly cardiogenic shock as well.

3. Atrial fibrillation with fast ventricular rate paroxysmal.

4. Persistent hypotension on Levophed.

5. Congestive heart failure, acute exacerbation, acute on chronic diastolic

    dysfunction, ejection fraction 55-60.

6. Acute renal failure, acute tubular necrosis with prerenal factors.

7. Acute urinary tract infection present on admission.

8. Anemia, microcytic anemia undetermined etiology.

9. Ascites, acute exacerbation secondary to nonalcoholic cirrhosis of the liver 
status

    post abdominal paracentesis, present on admission.

10.Anemia, microcytic anemia of undetermined etiology.

11.Diabetes mellitus type 2 uncontrolled with hyperglycemia.

12.Chronic mild coagulopathy secondary to chronic liver disease possibly.

13.Severe hyponatremia, present on admission.

14.Diabetes mellitus type 2, uncontrolled with hyperglycemia.

15.Mild metabolic acidosis.

16.Obesity.

17.Hypertension.

18.History of chronic liver disease.

19.Thrombocytopenia.

20.History of left breast cancer with lumpectomy.

21.History of peripheral neuropathy.

22.History of breast surgery.

23.History of cholecystectomy.

24.History of depression.

25.FULL CODE.



RECOMMENDATIONS AND DISCUSSION:

Recommend to continue current medications, continue with monitoring, symptomatic

treatment. We will monitor the fluid and electrolyte balance closely. Otherwise

continue to monitor the blood pressure.  Otherwise, I would also recommend 
continue

with current medications.  I will monitor the fluid and electrolytes balance 
closely.

If the patient is getting short of breath, I would recommend Lasix 40 mg 1 dose 
p.r.n.

Otherwise, continue the antibiotics.  Otherwise continue to monitor.  Prognosis

guarded.  Further recommendations to follow.  Wean off the Levophed.  Monitor 
blood

sugars closely and see orders.





MMODL / IJN: [id number] / Job#: [id number]

MTDD

## 2020-03-16 NOTE — XR
EXAMINATION TYPE: XR chest 1V portable

 

DATE OF EXAM: 3/16/2020

 

COMPARISON: 3/14/2020

 

HISTORY: Shortness of breath

 

FINDINGS:

There are bilateral pleural effusions with cardiomegaly and bibasilar infiltrate.  There is a diffuse
 interstitial pattern. Biapical pleural thickening. Arthropathy of the shoulders. Postsurgical change
 along the left axilla.

 

IMPRESSION:

1. Stable findings most typical of CHF.

## 2020-03-16 NOTE — P.PN
Subjective


Progress Note Date: 03/16/20


Principal diagnosis: 





Hypotension, most likely secondary to hypovolemia and possible non-ST elevation 

myocardial infarction.





This is a 79-year-old female patient with known history of nonalcoholic liver 

cirrhosis.  The patient also has history of diabetes and hypertension.  She has 

had previous paracentesis on several occasions for abdominal distention and 

ascites.  The patient came into the hospital and the patient was noted to have 

increased abdominal distention associated with some shortness of breath.  She 

also had some limited chest pain.  No nausea or vomiting.  She underwent a 

paracentesis for a total of 3.2 L of ascitic fluid was removed and it was not 

sent for any cultures.  The patient subsequently improved and she was doing 

well.  Her troponins were positive at 0.5.  The patient was given a cardiac 

stress test that came back positive for reversible ischemia and based on that 

and based on the positive stress test, the patient was being planned for a 

cardiac catheterization.  Earlier this morning the patient went into atrial 

fibrillation with RVR.  Her blood pressure dropped and she got transferred to 

the intensive care unit.  Upon arrival her systolic blood pressure was in the 

60s.  The patient was started on IV fluids bolus and she was given 1.5 L.  

Subsequently she was given 2.5 mg of IV Lopressor.  This afternoon she converted

back to normal sinus rhythm.  She is currently in normal sinus rhythm with a 

rate of 61.  She is on IV heparin.  She is doing well without any chest pain.  

She is on norepinephrine infusion running at 0.09 g per KG per minute.  Her 

most recent blood pressure 95/44.  Note that her creatinine came up to 1.8 and 

the patient is producing urine output in the order of 30 mL an hour.  No 

abdominal distention.  Chest x-ray shows some cardiomegaly.  Echo of the heart 

showed EF of around 55-60%.  Resume cardiac structures were all within normal 

limits.  The patient had mild aortic stenosis.  The right ventricular systolic 

pressure was 43.





The patient is seen today 03/13/202 in the intensive care unit. She is currently

awake and alert in no acute distress.  She remains hypotensive however.  She  is

on norepinephrine at 0.16 mcg/kg/m.  0.9 normal saline at 75 ML's per hour.  

Heparin drip per weight base protocol. Currently in sinus rhythm.  Maintaining 

O2 saturations in the 90s on 2 L/m per nasal cannula. White count 10.4.  

Hemoglobin 9.9.  Platelet count 181,000.  Sodium 129.  Potassium 5.1.  Bicarb 

19.  Creatinine 2.03.  TSH 1.12.





On 03/14/2020 on seeing the patient for a follow-up.  This is a follow-up this 

being done in the intensive care unit.  The patient is looking quite 

comfortable.  No altered mentation.  His resting comfortably in bed.  No 

tachycardia.  Her cardiac rhythm remains sinus.  Nevertheless, the patient 

remains profoundly hypotensive.  She is requiring pressors and currently norepin

ephrine infusion is running at 0.2 mcg/kg per minute.  The exact cause is not 

clear.  She is afebrile.  No significant leukocytosis.  No nausea vomiting.  No 

abdominal distention.  No diarrhea.  No respiratory distress.  She has an art 

line catheter and the blood pressure is being monitored for an outlying catheter

for now.  She is on empiric antibiotic coverage with IV Rocephin.  Her white 

cell count is at 10.9.  He has a 48 with a creatinine of 1.9.  She received a 

total of 3 5% albumin for hemodynamic support.  TPN is a 48 with a creatinine of

1.9.  The serum cortisols at 13.  No other significant issues for now.  She is 

resting comfortably in bed.  No signs of any encephalopathy.  As stated cardiac 

rhythm is sinus and her LV ejection fraction is 55-60%.





This patient is being seen in follow-up on 03/15/2020.  The patient is laying 

down comfortably in bed.  No significant complaints.  The ongoing hypotension is

still a concern.  The patient has been requiring pressors at the higher dose of 

norepinephrine infusion at 20 g per KG per minute.  The patient is also having 

a lower urine output.  She has been in a positive fluid balance of at least 7-8 

L over the past 48 hours.  She was given IV albumin on multiple occasions in the

urine output remains low.  Her creatinine is up to 1.9.  She also has developed 

a non-anion gap metabolic acidosis.  Based on that, I requested a nephrology 

consultation.  She was on empiric antibiotic coverage with IV Rocephin.  There 

is being continued for now.  Based on the ongoing changes, I started the patient

on bicarb drip.  I give the patient himself sodium bicarb when I requested the 

patient to be seen by nephrology.  A triple lumen catheter be established today.

 She remains normal sinus rhythm.  No fever.  No chills.  No abdominal dis

tention.  No nausea.  No vomiting.  No diarrhea.  No abdominal pain.  No other 

significant events overnight.  Note that the patient has a lactic acid level 

from today of 1.7.  ProBNP level is 5250.  AST and ALT are both within normal 

limits and ammonia level is at 158.  Serum cortisols at 18.





Reevaluated today on 3/16/20, patient is resting in bed, very comfortable, in no

distress, remains relatively hypotensive requiring norepinephrine at 0.03 

mcg/kg/m.  Mean arterial pressure is ranging between 60 and 65.  Patient 

received albumin.  And she is off IV bicarb, presently on oral bicarb.  Patient 

denies being in any distress.  And I plan to continue the tapering of the 

norepinephrine, I believe her hypotension is mostly secondary to high volume 

paracentesis.  So it is probably hypovolemic in nature.  Labs today showed 

relatively normal CBC and normal electrolytes BUN is 43 creatinine is 1.52, 

improving compared to creatinine of 1.98 yesterday.  Ammonia level remains high,

patient remains on lactulose.











Objective





- Vital Signs


Vital signs: 


                                   Vital Signs











Temp  97.4 F L  03/16/20 08:00


 


Pulse  67   03/16/20 09:00


 


Resp  17   03/16/20 09:00


 


BP  113/48   03/15/20 21:30


 


Pulse Ox  96   03/16/20 08:00








                                 Intake & Output











 03/15/20 03/16/20 03/16/20





 18:59 06:59 18:59


 


Intake Total 2744.942 1365.216 611.124


 


Output Total 450 515 120


 


Balance 2294.942 850.216 491.124


 


Weight  96.5 kg 


 


Intake:   


 


   825 275


 


    Dextrose 5% in Water 1,  825 225





    000 ml @ 75 mls/hr IV .   





    [id number] KHUSHI with Sodium   





    Bicarb (1 Meq/ml) 150 ml   





    Rx#:[id number]   


 


    Sodium Chloride 0.9% 1, 225  





    000 ml @ 75 mls/hr IV .   





    [id number] KHUSHI Rx#:[id number]   


 


    cefTRIAXone 1 gm In 50  50





    Sodium Chloride 0.9% 50   





    ml @ 100 mls/hr IVPB   





    Q24HR KHUSHI Rx#:[id number]   


 


  Intake, IV Titration 1989.942 290.216 336.124





  Amount   


 


    Dextrose 5% in Water 1, 675 75 





    000 ml @ 75 mls/hr IV .   





    [id number] KHUSHI with Sodium   





    Bicarb (1 Meq/ml) 150 ml   





    Rx#:[id number]   


 


    Heparin Sod,Pork in 0.45%   206.347





    NaCl 25,000 unit In 0.45   





    % NaCl 1 250ml.bag @ 12   





    UNITS/KG/HR 9.998 mls/hr   





    IV .Q24H KHUSHI Rx#:   





    [id number]   


 


    Insulin Regular 100 unit 63.353 62.208 28.785





    In Sodium Chloride 0.9%   





    100 ml @ Per Protocol IV   





    .Q0M KHUSHI Rx#:[id number]   


 


    Norepinephrine 4 mg In 751.589 153.008 100.992





    Sodium Chloride 0.9% 250   





    ml @ 0.05 MCG/KG/MIN 15.   





    872 mls/hr IV .Q16H1M KHUSHI   





    Rx#:[id number]   


 


    Sodium Chloride 0.9% 1, 500  





    000 ml @ 500 mls/hr IV .   





    Q2H ONE Rx#:[id number]   


 


  Oral 480 250 


 


Output:   


 


  Urine 450 515 120


 


Other:   


 


  Voiding Method Indwelling Catheter Indwelling Catheter Indwelling Catheter


 


  # Bowel Movements  1 








                       ABP, PAP, CO, CI - Last Documented











Arterial Blood Pressure        149/54

















- Exam








GENERAL EXAM: Revealed 79-year-old female on room air, in no distress.


HEENT: PERRLA, EOMI, neck, dry mucous membranes.  Throat is clear.


CHEST: No chest wall deformity.


LUNGS: Diminished breath sounds at the bases no crackles or rhonchi or wheezes.


CVS: S1 and S2 normal with no audible murmur, regular rhythm.


ABDOMEN: Obese, Soft, normal bowel sounds, no guarding or rigidity.


SPINE: No scoliosis or deformity


SKIN: No rashes


CENTRAL NERVOUS SYSTEM: Alert and oriented 3, no gross focal neurologic 

deficits.


EXTREMITIES: There is no peripheral edema. 


Psychiatric: Normal mood, affect and normal mental status examination.








- Labs


CBC & Chem 7: 


                                 03/16/20 05:20





                                 03/16/20 05:20


Labs: 


                  Abnormal Lab Results - Last 24 Hours (Table)











  03/15/20 03/15/20 03/15/20 Range/Units





  10:54 12:00 12:12 


 


RBC     (3.80-5.40)  m/uL


 


Hgb     (11.4-16.0)  gm/dL


 


Hct     (34.0-46.0)  %


 


RDW     (11.5-15.5)  %


 


Plt Count     (150-450)  k/uL


 


Lymphocytes #     (1.0-4.8)  k/uL


 


APTT     (22.0-30.0)  sec


 


Sodium     (137-145)  mmol/L


 


Chloride     ()  mmol/L


 


Carbon Dioxide     (22-30)  mmol/L


 


BUN     (7-17)  mg/dL


 


Creatinine     (0.52-1.04)  mg/dL


 


Glucose     (74-99)  mg/dL


 


POC Glucose (mg/dL)  137 H   169 H  (75-99)  mg/dL


 


Calcium     (8.4-10.2)  mg/dL


 


Ammonia     (<30)  umol/L


 


Urine Protein   1+ H   (Negative)  


 


Urine Ketones   Trace H   (Negative)  


 


Ur Leukocyte Esterase   Trace H   (Negative)  


 


Hyaline Casts   33 H   (0-2)  /lpf


 


Urine Mucus   Rare H   (None)  /hpf














  03/15/20 03/15/20 03/15/20 Range/Units





  15:20 15:57 17:57 


 


RBC     (3.80-5.40)  m/uL


 


Hgb     (11.4-16.0)  gm/dL


 


Hct     (34.0-46.0)  %


 


RDW     (11.5-15.5)  %


 


Plt Count     (150-450)  k/uL


 


Lymphocytes #     (1.0-4.8)  k/uL


 


APTT     (22.0-30.0)  sec


 


Sodium     (137-145)  mmol/L


 


Chloride     ()  mmol/L


 


Carbon Dioxide     (22-30)  mmol/L


 


BUN     (7-17)  mg/dL


 


Creatinine     (0.52-1.04)  mg/dL


 


Glucose     (74-99)  mg/dL


 


POC Glucose (mg/dL)  162 H  193 H  225 H  (75-99)  mg/dL


 


Calcium     (8.4-10.2)  mg/dL


 


Ammonia     (<30)  umol/L


 


Urine Protein     (Negative)  


 


Urine Ketones     (Negative)  


 


Ur Leukocyte Esterase     (Negative)  


 


Hyaline Casts     (0-2)  /lpf


 


Urine Mucus     (None)  /hpf














  03/15/20 03/15/20 03/15/20 Range/Units





  18:53 20:20 21:06 


 


RBC     (3.80-5.40)  m/uL


 


Hgb     (11.4-16.0)  gm/dL


 


Hct     (34.0-46.0)  %


 


RDW     (11.5-15.5)  %


 


Plt Count     (150-450)  k/uL


 


Lymphocytes #     (1.0-4.8)  k/uL


 


APTT     (22.0-30.0)  sec


 


Sodium     (137-145)  mmol/L


 


Chloride     ()  mmol/L


 


Carbon Dioxide     (22-30)  mmol/L


 


BUN     (7-17)  mg/dL


 


Creatinine     (0.52-1.04)  mg/dL


 


Glucose     (74-99)  mg/dL


 


POC Glucose (mg/dL)  162 H  193 H  215 H  (75-99)  mg/dL


 


Calcium     (8.4-10.2)  mg/dL


 


Ammonia     (<30)  umol/L


 


Urine Protein     (Negative)  


 


Urine Ketones     (Negative)  


 


Ur Leukocyte Esterase     (Negative)  


 


Hyaline Casts     (0-2)  /lpf


 


Urine Mucus     (None)  /hpf














  03/15/20 03/15/20 03/16/20 Range/Units





  22:04 22:56 00:07 


 


RBC     (3.80-5.40)  m/uL


 


Hgb     (11.4-16.0)  gm/dL


 


Hct     (34.0-46.0)  %


 


RDW     (11.5-15.5)  %


 


Plt Count     (150-450)  k/uL


 


Lymphocytes #     (1.0-4.8)  k/uL


 


APTT     (22.0-30.0)  sec


 


Sodium     (137-145)  mmol/L


 


Chloride     ()  mmol/L


 


Carbon Dioxide     (22-30)  mmol/L


 


BUN     (7-17)  mg/dL


 


Creatinine     (0.52-1.04)  mg/dL


 


Glucose     (74-99)  mg/dL


 


POC Glucose (mg/dL)  197 H  176 H  181 H  (75-99)  mg/dL


 


Calcium     (8.4-10.2)  mg/dL


 


Ammonia     (<30)  umol/L


 


Urine Protein     (Negative)  


 


Urine Ketones     (Negative)  


 


Ur Leukocyte Esterase     (Negative)  


 


Hyaline Casts     (0-2)  /lpf


 


Urine Mucus     (None)  /hpf














  03/16/20 03/16/20 03/16/20 Range/Units





  00:58 01:52 02:57 


 


RBC     (3.80-5.40)  m/uL


 


Hgb     (11.4-16.0)  gm/dL


 


Hct     (34.0-46.0)  %


 


RDW     (11.5-15.5)  %


 


Plt Count     (150-450)  k/uL


 


Lymphocytes #     (1.0-4.8)  k/uL


 


APTT     (22.0-30.0)  sec


 


Sodium     (137-145)  mmol/L


 


Chloride     ()  mmol/L


 


Carbon Dioxide     (22-30)  mmol/L


 


BUN     (7-17)  mg/dL


 


Creatinine     (0.52-1.04)  mg/dL


 


Glucose     (74-99)  mg/dL


 


POC Glucose (mg/dL)  174 H  175 H  239 H  (75-99)  mg/dL


 


Calcium     (8.4-10.2)  mg/dL


 


Ammonia     (<30)  umol/L


 


Urine Protein     (Negative)  


 


Urine Ketones     (Negative)  


 


Ur Leukocyte Esterase     (Negative)  


 


Hyaline Casts     (0-2)  /lpf


 


Urine Mucus     (None)  /hpf














  03/16/20 03/16/20 03/16/20 Range/Units





  04:09 04:53 05:20 


 


RBC    3.53 L  (3.80-5.40)  m/uL


 


Hgb    9.0 L  (11.4-16.0)  gm/dL


 


Hct    29.0 L  (34.0-46.0)  %


 


RDW    17.5 H  (11.5-15.5)  %


 


Plt Count    68 L  (150-450)  k/uL


 


Lymphocytes #    0.5 L  (1.0-4.8)  k/uL


 


APTT     (22.0-30.0)  sec


 


Sodium     (137-145)  mmol/L


 


Chloride     ()  mmol/L


 


Carbon Dioxide     (22-30)  mmol/L


 


BUN     (7-17)  mg/dL


 


Creatinine     (0.52-1.04)  mg/dL


 


Glucose     (74-99)  mg/dL


 


POC Glucose (mg/dL)  177 H  165 H   (75-99)  mg/dL


 


Calcium     (8.4-10.2)  mg/dL


 


Ammonia     (<30)  umol/L


 


Urine Protein     (Negative)  


 


Urine Ketones     (Negative)  


 


Ur Leukocyte Esterase     (Negative)  


 


Hyaline Casts     (0-2)  /lpf


 


Urine Mucus     (None)  /hpf














  03/16/20 03/16/20 03/16/20 Range/Units





  05:20 05:20 05:20 


 


RBC     (3.80-5.40)  m/uL


 


Hgb     (11.4-16.0)  gm/dL


 


Hct     (34.0-46.0)  %


 


RDW     (11.5-15.5)  %


 


Plt Count     (150-450)  k/uL


 


Lymphocytes #     (1.0-4.8)  k/uL


 


APTT    55.6 H  (22.0-30.0)  sec


 


Sodium  136 L    (137-145)  mmol/L


 


Chloride  108 H    ()  mmol/L


 


Carbon Dioxide  21 L    (22-30)  mmol/L


 


BUN  43 H    (7-17)  mg/dL


 


Creatinine  1.52 H    (0.52-1.04)  mg/dL


 


Glucose  166 H    (74-99)  mg/dL


 


POC Glucose (mg/dL)     (75-99)  mg/dL


 


Calcium  8.1 L    (8.4-10.2)  mg/dL


 


Ammonia   58 H   (<30)  umol/L


 


Urine Protein     (Negative)  


 


Urine Ketones     (Negative)  


 


Ur Leukocyte Esterase     (Negative)  


 


Hyaline Casts     (0-2)  /lpf


 


Urine Mucus     (None)  /hpf














  03/16/20 03/16/20 03/16/20 Range/Units





  06:04 06:58 08:53 


 


RBC     (3.80-5.40)  m/uL


 


Hgb     (11.4-16.0)  gm/dL


 


Hct     (34.0-46.0)  %


 


RDW     (11.5-15.5)  %


 


Plt Count     (150-450)  k/uL


 


Lymphocytes #     (1.0-4.8)  k/uL


 


APTT     (22.0-30.0)  sec


 


Sodium     (137-145)  mmol/L


 


Chloride     ()  mmol/L


 


Carbon Dioxide     (22-30)  mmol/L


 


BUN     (7-17)  mg/dL


 


Creatinine     (0.52-1.04)  mg/dL


 


Glucose     (74-99)  mg/dL


 


POC Glucose (mg/dL)  165 H  166 H  159 H  (75-99)  mg/dL


 


Calcium     (8.4-10.2)  mg/dL


 


Ammonia     (<30)  umol/L


 


Urine Protein     (Negative)  


 


Urine Ketones     (Negative)  


 


Ur Leukocyte Esterase     (Negative)  


 


Hyaline Casts     (0-2)  /lpf


 


Urine Mucus     (None)  /hpf








                      Microbiology - Last 24 Hours (Table)











 03/15/20 12:00 Urine Culture - Preliminary





 Urine,Catheterized 














Assessment and Plan


Assessment: 





Impression:


Hypertension, most likely hypovolemic in nature.  Related to high volume 

paracentesis.


Nonalcoholic liver cirrhosis, status post high-volume paracentesis on 3/10/20.


Paroxysmal atrial fibrillation presently in normal sinus rhythm.


Positive troponins, possible non-ST elevation myocardial infarction.


Liver cirrhosis and portal hypertension with mild coagulopathy.


Type 2 diabetes.


History of breast cancer and previous left lumpectomy


Peripheral neuropathy recommended 2 diabetes.


Acute kidney injury most likely secondary to hypotension and acute tubular 

necrosis.  Improving slowly.





Recommendation:


Titrate norepinephrine gradually and possibly discontinue.


Continue IV heparin.


Continue lactulose.


Continue Florinef.


Continue oral bicarb.


We will continue to monitor in the ICU as long as she is on norepinephrine, 

possibly transfer out of the ICU in the next 24 hours was norepinephrine is 

discontinued.


We'll continue to follow.





Time with Patient: Less than 30

## 2020-03-17 LAB
ANION GAP SERPL CALC-SCNC: 7 MMOL/L
BASOPHILS # BLD AUTO: 0 K/UL (ref 0–0.2)
BASOPHILS NFR BLD AUTO: 0 %
BUN SERPL-SCNC: 38 MG/DL (ref 7–17)
CALCIUM SPEC-MCNC: 8.2 MG/DL (ref 8.4–10.2)
CHLORIDE SERPL-SCNC: 109 MMOL/L (ref 98–107)
CO2 SERPL-SCNC: 21 MMOL/L (ref 22–30)
EOSINOPHIL # BLD AUTO: 0.1 K/UL (ref 0–0.7)
EOSINOPHIL NFR BLD AUTO: 3 %
ERYTHROCYTE [DISTWIDTH] IN BLOOD BY AUTOMATED COUNT: 3.63 M/UL (ref 3.8–5.4)
ERYTHROCYTE [DISTWIDTH] IN BLOOD: 17.6 % (ref 11.5–15.5)
GLUCOSE BLD-MCNC: 118 MG/DL (ref 75–99)
GLUCOSE BLD-MCNC: 131 MG/DL (ref 75–99)
GLUCOSE BLD-MCNC: 135 MG/DL (ref 75–99)
GLUCOSE BLD-MCNC: 181 MG/DL (ref 75–99)
GLUCOSE BLD-MCNC: 185 MG/DL (ref 75–99)
GLUCOSE SERPL-MCNC: 192 MG/DL (ref 74–99)
HCT VFR BLD AUTO: 29.5 % (ref 34–46)
HGB BLD-MCNC: 9.1 GM/DL (ref 11.4–16)
LYMPHOCYTES # SPEC AUTO: 0.5 K/UL (ref 1–4.8)
LYMPHOCYTES NFR SPEC AUTO: 14 %
MCH RBC QN AUTO: 25.1 PG (ref 25–35)
MCHC RBC AUTO-ENTMCNC: 30.9 G/DL (ref 31–37)
MCV RBC AUTO: 81.3 FL (ref 80–100)
MONOCYTES # BLD AUTO: 0.2 K/UL (ref 0–1)
MONOCYTES NFR BLD AUTO: 6 %
NEUTROPHILS # BLD AUTO: 2.7 K/UL (ref 1.3–7.7)
NEUTROPHILS NFR BLD AUTO: 74 %
PLATELET # BLD AUTO: 63 K/UL (ref 150–450)
POTASSIUM SERPL-SCNC: 3.8 MMOL/L (ref 3.5–5.1)
SODIUM SERPL-SCNC: 137 MMOL/L (ref 137–145)
WBC # BLD AUTO: 3.6 K/UL (ref 3.8–10.6)

## 2020-03-17 RX ADMIN — ASPIRIN 81 MG CHEWABLE TABLET SCH MG: 81 TABLET CHEWABLE at 08:25

## 2020-03-17 RX ADMIN — FLUDROCORTISONE ACETATE SCH MG: 0.1 TABLET ORAL at 20:34

## 2020-03-17 RX ADMIN — CYANOCOBALAMIN TAB 500 MCG SCH MCG: 500 TAB at 08:24

## 2020-03-17 RX ADMIN — METOPROLOL TARTRATE SCH MG: 25 TABLET, FILM COATED ORAL at 08:25

## 2020-03-17 RX ADMIN — Medication SCH MG: at 17:35

## 2020-03-17 RX ADMIN — APIXABAN SCH MG: 5 TABLET, FILM COATED ORAL at 08:25

## 2020-03-17 RX ADMIN — GABAPENTIN SCH MG: 100 CAPSULE ORAL at 08:24

## 2020-03-17 RX ADMIN — METOPROLOL TARTRATE SCH MG: 25 TABLET, FILM COATED ORAL at 20:34

## 2020-03-17 RX ADMIN — FUROSEMIDE SCH: 40 TABLET ORAL at 17:13

## 2020-03-17 RX ADMIN — GABAPENTIN SCH MG: 100 CAPSULE ORAL at 21:18

## 2020-03-17 RX ADMIN — Medication SCH MG: at 21:18

## 2020-03-17 RX ADMIN — APIXABAN SCH MG: 5 TABLET, FILM COATED ORAL at 20:33

## 2020-03-17 RX ADMIN — SPIRONOLACTONE SCH MG: 25 TABLET, FILM COATED ORAL at 08:25

## 2020-03-17 RX ADMIN — FLUDROCORTISONE ACETATE SCH MG: 0.1 TABLET ORAL at 08:25

## 2020-03-17 RX ADMIN — INSULIN ASPART SCH UNIT: 100 INJECTION, SOLUTION INTRAVENOUS; SUBCUTANEOUS at 12:05

## 2020-03-17 RX ADMIN — PIOGLITAZONE SCH MG: 30 TABLET ORAL at 08:25

## 2020-03-17 RX ADMIN — SPIRONOLACTONE SCH MG: 25 TABLET, FILM COATED ORAL at 20:34

## 2020-03-17 RX ADMIN — INSULIN DETEMIR SCH UNIT: 100 INJECTION, SOLUTION SUBCUTANEOUS at 20:35

## 2020-03-17 RX ADMIN — LACTULOSE SCH GM: 20 SOLUTION ORAL at 09:21

## 2020-03-17 RX ADMIN — Medication SCH UNIT: at 08:24

## 2020-03-17 RX ADMIN — Medication SCH MG: at 12:05

## 2020-03-17 RX ADMIN — PANTOPRAZOLE SODIUM SCH MG: 40 INJECTION, POWDER, FOR SOLUTION INTRAVENOUS at 08:25

## 2020-03-17 RX ADMIN — GABAPENTIN SCH MG: 100 CAPSULE ORAL at 17:35

## 2020-03-17 RX ADMIN — INSULIN ASPART SCH UNIT: 100 INJECTION, SOLUTION INTRAVENOUS; SUBCUTANEOUS at 06:55

## 2020-03-17 RX ADMIN — LINAGLIPTIN SCH MG: 5 TABLET, FILM COATED ORAL at 08:25

## 2020-03-17 RX ADMIN — SERTRALINE HYDROCHLORIDE SCH MG: 50 TABLET, FILM COATED ORAL at 08:24

## 2020-03-17 RX ADMIN — INSULIN ASPART SCH: 100 INJECTION, SOLUTION INTRAVENOUS; SUBCUTANEOUS at 20:34

## 2020-03-17 RX ADMIN — Medication SCH MG: at 08:24

## 2020-03-17 RX ADMIN — SPIRONOLACTONE SCH: 25 TABLET, FILM COATED ORAL at 09:23

## 2020-03-17 RX ADMIN — INSULIN ASPART SCH UNIT: 100 INJECTION, SOLUTION INTRAVENOUS; SUBCUTANEOUS at 12:06

## 2020-03-17 RX ADMIN — INSULIN DETEMIR SCH UNIT: 100 INJECTION, SOLUTION SUBCUTANEOUS at 08:24

## 2020-03-17 RX ADMIN — INSULIN ASPART SCH UNIT: 100 INJECTION, SOLUTION INTRAVENOUS; SUBCUTANEOUS at 17:35

## 2020-03-17 NOTE — PN
PROGRESS NOTE



DATE OF DICTATION:

03/17/2020



The patient is a 79-year-old pleasant white female who remains in the intensive care

unit with A Fib RVR and hypotension on pressors. She is doing much better. She is off

Levophed since yesterday. She remains hemodynamically stable.



She has a history of nonalcoholic cirrhosis of the liver with portal hypertension and

ascites. Large volume paracentesis done a week ago and _____. Overall, she is doing

much better. She has no abdominal pain.  She complains of some lower extremity

swelling.  Denies any nausea, vomiting, rectal bleeding or melena.



PHYSICAL EXAMINATION:

On physical examination, appears comfortable. Blood pressure is 113/48, pulse is 70,

temperature 98.

HEENT EXAMINATION:  Unremarkable.  Conjunctivae pink. Sclerae anicteric. Oral cavity,

no lesions.

NECK: No JVD or lymph node enlargement.

CHEST: Clear to auscultation.

HEART: Regular rate and rhythm.

ABDOMEN:  Soft.  There was no free fluid noted.

EXTREMITIES: Trace pedal edema.

SKIN: No rashes.

NEURO: She is alert and oriented x3.  No focal deficits.



LABS:

Labs from today: WBC 3.6, hemoglobin 9.1, platelets 63,000.  BUN is 38, creatinine

1.25.



IMPRESSION:

1. Atrial fibrillation with rapid ventricular response and hypotension, resolved.

2. Cirrhosis of the liver with portal hypertension and ascites, status post large

    volume paracentesis done on March 10th and 3 L of fluid removed, now stable. No

    recurrence of ascites.

3. Longstanding history of diabetes mellitus.

4. Acute kidney injury, gradually improving.

5. Atrial fibrillation on IV heparin.



RECOMMENDATIONS:

1. Continue current management as per ICU.

2. We will monitor her closely.

3. Thank you for this consultation.





MMODL / IJN: [id number] / Job#: [id number]

## 2020-03-17 NOTE — PN
PROGRESS NOTE



DATE OF SERVICE:

03/17/2020



This 79-year-old woman who was admitted with chest pain and elevated troponin, also had

abnormal stress test.  Patient also had hypotension with cardiac shock and multiple

other medical problems also.  The patient also has afib with fast ventricular rate.

Patient being closely monitored.  Blood pressure is improved at this time.  The patient

is off Levophed, but however the creatinine is elevated at 1.25 and the patient also

had mild pancytopenia with WBC 3.6, hemoglobin 9.1, and platelets 63.  Most recent

chest x-ray which was done today which was personally reviewed by me showed evidence of

significant for CHF.

Patient is being closely monitored at this time.



PAST MEDICAL HISTORY:

Reviewed.



REVIEW OF SYMPTOMS:

Cardiovascular:  As mentioned earlier.

Respiratory: As mentioned earlier.

GI: As mentioned earlier.

 no dysuria.

Nervous systems: As mentioned earlier.



MEDICATIONS:

Current medications are reviewed and include:

1. Tylenol 500 mg p.o. q.6h p.r.n.

2. Xanax 0.5 t.i.d.

3. Eliquis 5 mg b.i.d.

4. Aspirin 81.

5. Rocephin 1 g daily.

6. Vitamin D3 2000.

7. B12 1000 mg.

8. Florinef 0.2 b.i.d.

9. Neurontin 100 mg p.o. t.i.d.

10.Dilaudid.

11.Levemir.

12.Tradjenta.

13.Replacement protocols.

14.Zofran.

15.Protonix.

16.Actos.

17.Aldactone.

18.Restoril.



PHYSICAL EXAM:

Patient is alert, oriented x3.  Pulse 78. Blood pressure 125/50.  Respirations 18.

Temperature 97.8,  pulse ox 92% on room air.

HEENT: Conjunctivae normal.

NECK: No JVD.

CARDIOVASCULAR: S1, S2 muffled.

RESPIRATORY: Breath sounds diminished in the bases. A few scattered rhonchi and

crackles.

ABDOMEN:  Soft, nontender.

LEGS are no edema. No swelling.

CENTRAL NERVOUS SYSTEM: No focal deficits.



LABS:

At this time shows creatinine is 1.25, BUN is 38, WBC 3.2, hemoglobin 9.1, platelets

63.



ASSESSMENT:

1. Chest pain with troponin 0.050 possible acute non-ST-segment-elevation myocardial

    infarction with abnormal stress test.

2. Relative hypotension multifactorial possibly cardiogenic shock as well.

3. Possible congestive heart failure acute exacerbation.

4. Atrial fibrillation with fast ventricular rate, paroxysmal.

5. Persistent hypotension on Levophed.

6. Congestive heart failure, acute exacerbation with acute on chronic diastolic

    dysfunction ejection fraction 50-60 percent.

7. Acute renal failure, acute tubular necrosis with prerenal factors.

8. Acute urinary tract infection present on admission.

9. Anemia, microcytic anemia undetermined etiology.

10.Ascites, acute exacerbation secondary to nonalcoholic cirrhosis of liver, status

    post abdominal paracentesis, present on admission.

11.Anemia, microcytic anemia of undetermined etiology.

12.Diabetes mellitus type 2, uncontrolled with hyperglycemia.

13.Chronic mild coagulopathy secondary to chronic liver disease, possibly.

14.Severe hyponatremia, present on admission.

15.Diabetes mellitus type 2, uncontrolled with hyperglycemia.

16.Mild metabolic acidosis.

17.Obesity.

18.Hypertension.

19.History of chronic liver disease.

20.History of thrombocytopenia.

21.History of left breast cancer with lumpectomy.

22.History of peripheral neuropathy.

23.History of breast surgery.

24.History of cholecystectomy.

25.History of depression.

26.Elevated ammonia.

27.FULL CODE.



RECOMMENDATIONS AND DISCUSSION DISCUSSION:

In this 79-year-old woman who presented with multiple complex medical issues, we will

monitor the patient closely.  The blood pressure is definitely improved, but however

the patient has significant changes in the chest x-ray suggestive of CHF.  Lasix IV 1

dose was given by Dr. Chandler.  We will continue to monitor.  The patient might possibly

require Lasix on a daily basis.  Monitor the renal functions closely.  Avoid

nephrotoxic medications.  Continue the broad-spectrum IV antibiotics and continue with

apixaban and as far as diabetes is concerned, the patient has significant elevated

blood sugars and the patient has to be given IV insulin drip, but currently on 30 units

twice daily of Levemir.  The blood sugar appears to be fairly controlled along with

Tradjenta.  Otherwise, continue the rest of medications.  Overall prognosis as

mentioned earlier, extremely guarded because of multiple complex comorbidities and we

will continue to monitor.  The patient is also on Aldactone, started on a small dose of

Aldactone.  Also we will monitor the potassium very closely.  Otherwise discussed with

the patient who understands and agrees.





MMODL / IJN: [id number] / Job#: [id number]

## 2020-03-17 NOTE — XR
EXAMINATION TYPE: XR chest 1V portable

 

DATE OF EXAM: 3/17/2020

 

COMPARISON: Prior chest 3/16/2020

 

HISTORY: Ascites, abnormal chest x-ray, congestive heart failure

 

TECHNIQUE: Single frontal view of the chest is obtained.

 

FINDINGS:  Pleural parenchymal changes are similar to prior exam. Patient is rotated. Heart is enlarg
ed. Left hemidiaphragm, portion of the right hemidiaphragm are obscured. Central vascularity and inte
rstitium are increased. Postop change noted in the left axilla.

 

IMPRESSION:  Correlate for volume overload, congestive heart failure with pleural effusions. Pneumoni
a not excluded.

## 2020-03-17 NOTE — P.PN
Subjective


Progress Note Date: 03/17/20


Principal diagnosis: 





Hypotension, most likely secondary to hypovolemia and possible non-ST elevation 

myocardial infarction.





This is a 79-year-old female patient with known history of nonalcoholic liver 

cirrhosis.  The patient also has history of diabetes and hypertension.  She has 

had previous paracentesis on several occasions for abdominal distention and 

ascites.  The patient came into the hospital and the patient was noted to have 

increased abdominal distention associated with some shortness of breath.  She 

also had some limited chest pain.  No nausea or vomiting.  She underwent a 

paracentesis for a total of 3.2 L of ascitic fluid was removed and it was not 

sent for any cultures.  The patient subsequently improved and she was doing 

well.  Her troponins were positive at 0.5.  The patient was given a cardiac 

stress test that came back positive for reversible ischemia and based on that 

and based on the positive stress test, the patient was being planned for a 

cardiac catheterization.  Earlier this morning the patient went into atrial 

fibrillation with RVR.  Her blood pressure dropped and she got transferred to 

the intensive care unit.  Upon arrival her systolic blood pressure was in the 

60s.  The patient was started on IV fluids bolus and she was given 1.5 L.  

Subsequently she was given 2.5 mg of IV Lopressor.  This afternoon she converted

back to normal sinus rhythm.  She is currently in normal sinus rhythm with a 

rate of 61.  She is on IV heparin.  She is doing well without any chest pain.  

She is on norepinephrine infusion running at 0.09 g per KG per minute.  Her 

most recent blood pressure 95/44.  Note that her creatinine came up to 1.8 and 

the patient is producing urine output in the order of 30 mL an hour.  No 

abdominal distention.  Chest x-ray shows some cardiomegaly.  Echo of the heart 

showed EF of around 55-60%.  Resume cardiac structures were all within normal 

limits.  The patient had mild aortic stenosis.  The right ventricular systolic 

pressure was 43.





The patient is seen today 03/13/202 in the intensive care unit. She is currently

awake and alert in no acute distress.  She remains hypotensive however.  She  is

on norepinephrine at 0.16 mcg/kg/m.  0.9 normal saline at 75 ML's per hour.  

Heparin drip per weight base protocol. Currently in sinus rhythm.  Maintaining 

O2 saturations in the 90s on 2 L/m per nasal cannula. White count 10.4.  

Hemoglobin 9.9.  Platelet count 181,000.  Sodium 129.  Potassium 5.1.  Bicarb 

19.  Creatinine 2.03.  TSH 1.12.





On 03/14/2020 on seeing the patient for a follow-up.  This is a follow-up this 

being done in the intensive care unit.  The patient is looking quite 

comfortable.  No altered mentation.  His resting comfortably in bed.  No 

tachycardia.  Her cardiac rhythm remains sinus.  Nevertheless, the patient 

remains profoundly hypotensive.  She is requiring pressors and currently norepin

ephrine infusion is running at 0.2 mcg/kg per minute.  The exact cause is not 

clear.  She is afebrile.  No significant leukocytosis.  No nausea vomiting.  No 

abdominal distention.  No diarrhea.  No respiratory distress.  She has an art 

line catheter and the blood pressure is being monitored for an outlying catheter

for now.  She is on empiric antibiotic coverage with IV Rocephin.  Her white 

cell count is at 10.9.  He has a 48 with a creatinine of 1.9.  She received a 

total of 3 5% albumin for hemodynamic support.  TPN is a 48 with a creatinine of

1.9.  The serum cortisols at 13.  No other significant issues for now.  She is 

resting comfortably in bed.  No signs of any encephalopathy.  As stated cardiac 

rhythm is sinus and her LV ejection fraction is 55-60%.





This patient is being seen in follow-up on 03/15/2020.  The patient is laying 

down comfortably in bed.  No significant complaints.  The ongoing hypotension is

still a concern.  The patient has been requiring pressors at the higher dose of 

norepinephrine infusion at 20 g per KG per minute.  The patient is also having 

a lower urine output.  She has been in a positive fluid balance of at least 7-8 

L over the past 48 hours.  She was given IV albumin on multiple occasions in the

urine output remains low.  Her creatinine is up to 1.9.  She also has developed 

a non-anion gap metabolic acidosis.  Based on that, I requested a nephrology 

consultation.  She was on empiric antibiotic coverage with IV Rocephin.  There 

is being continued for now.  Based on the ongoing changes, I started the patient

on bicarb drip.  I give the patient himself sodium bicarb when I requested the 

patient to be seen by nephrology.  A triple lumen catheter be established today.

 She remains normal sinus rhythm.  No fever.  No chills.  No abdominal dis

tention.  No nausea.  No vomiting.  No diarrhea.  No abdominal pain.  No other 

significant events overnight.  Note that the patient has a lactic acid level 

from today of 1.7.  ProBNP level is 5250.  AST and ALT are both within normal 

limits and ammonia level is at 158.  Serum cortisols at 18.





Reevaluated today on 3/16/20, patient is resting in bed, very comfortable, in no

distress, remains relatively hypotensive requiring norepinephrine at 0.03 

mcg/kg/m.  Mean arterial pressure is ranging between 60 and 65.  Patient 

received albumin.  And she is off IV bicarb, presently on oral bicarb.  Patient 

denies being in any distress.  And I plan to continue the tapering of the 

norepinephrine, I believe her hypotension is mostly secondary to high volume 

paracentesis.  So it is probably hypovolemic in nature.  Labs today showed 

relatively normal CBC and normal electrolytes BUN is 43 creatinine is 1.52, 

improving compared to creatinine of 1.98 yesterday.  Ammonia level remains high,

patient remains on lactulose.





Reevaluated today on 3/17/20, patient is off norepinephrine, she is 

hemodynamically stable, feeling much better, denies any shortness of breath 

cough or wheezing.  She is still on diuretics, still on lactulose, all labs 

including CBC and basic metabolic profile were reviewed, creatinine is 1.25 

today.  Improving.











Objective





- Vital Signs


Vital signs: 


                                   Vital Signs











Temp  98.0 F   03/17/20 08:00


 


Pulse  70   03/17/20 10:00


 


Resp  17   03/17/20 10:00


 


BP  113/48   03/17/20 05:00


 


Pulse Ox  94 L  03/17/20 08:00








                                 Intake & Output











 03/16/20 03/17/20 03/17/20





 18:59 06:59 18:59


 


Intake Total 5911.300 5036 350


 


Output Total 570 795 455


 


Balance 489.178 205 -105


 


Weight  97.3 kg 97.3 kg


 


Intake:   


 


   550 150


 


    0.9 @ 50 500 550 100


 


    Dextrose 5% in Water 1, 150  





    000 ml @ 75 mls/hr IV .   





    [id number] KHUSHI with Sodium   





    Bicarb (1 Meq/ml) 150 ml   





    Rx#:[id number]   


 


    cefTRIAXone 1 gm In 50  50





    Sodium Chloride 0.9% 50   





    ml @ 100 mls/hr IVPB   





    Q24HR KHUSHI Rx#:[id number]   


 


  Intake, IV Titration 359.178  





  Amount   


 


    Heparin Sod,Pork in 0.45% 206.347  





    NaCl 25,000 unit In 0.45   





    % NaCl 1 250ml.bag @ 12   





    UNITS/KG/HR 9.998 mls/hr   





    IV .Q24H KHUSHI Rx#:   





    [id number]   


 


    Insulin Regular 100 unit 51.839  





    In Sodium Chloride 0.9%   





    100 ml @ Per Protocol IV   





    .Q0M KHUSHI Rx#:[id number]   


 


    Norepinephrine 4 mg In 100.992  





    Sodium Chloride 0.9% 250   





    ml @ 0.05 MCG/KG/MIN 15.   





    872 mls/hr IV .Q16H1M KHUSHI   





    Rx#:[id number]   


 


  Oral  450 200


 


Output:   


 


  Urine 570 795 455


 


Other:   


 


  Voiding Method Indwelling Catheter Indwelling Catheter Indwelling Catheter








                       ABP, PAP, CO, CI - Last Documented











Arterial Blood Pressure        137/44

















- Exam








GENERAL EXAM: Revealed 79-year-old female on room air, in no distress.


HEENT: PERRLA, EOMI, neck, dry mucous membranes.  Throat is clear.


CHEST: No chest wall deformity.


LUNGS: Diminished breath sounds at the bases no crackles or rhonchi or wheezes.


CVS: S1 and S2 normal with no audible murmur, regular rhythm.


ABDOMEN: Obese, Soft, normal bowel sounds, no guarding or rigidity.


SPINE: No scoliosis or deformity


SKIN: No rashes


CENTRAL NERVOUS SYSTEM: Alert and oriented 3, no gross focal neurologic 

deficits.


EXTREMITIES: There is no peripheral edema. 


Psychiatric: Normal mood, affect and normal mental status examination.








- Labs


CBC & Chem 7: 


                                 03/17/20 04:30





                                 03/17/20 04:30


Labs: 


                  Abnormal Lab Results - Last 24 Hours (Table)











  03/16/20 03/16/20 03/16/20 Range/Units





  11:51 13:07 14:03 


 


WBC     (3.8-10.6)  k/uL


 


RBC     (3.80-5.40)  m/uL


 


Hgb     (11.4-16.0)  gm/dL


 


Hct     (34.0-46.0)  %


 


MCHC     (31.0-37.0)  g/dL


 


RDW     (11.5-15.5)  %


 


Plt Count     (150-450)  k/uL


 


Lymphocytes #     (1.0-4.8)  k/uL


 


Chloride     ()  mmol/L


 


Carbon Dioxide     (22-30)  mmol/L


 


BUN     (7-17)  mg/dL


 


Creatinine     (0.52-1.04)  mg/dL


 


Glucose     (74-99)  mg/dL


 


POC Glucose (mg/dL)  162 H  119 H  113 H  (75-99)  mg/dL


 


Calcium     (8.4-10.2)  mg/dL


 


Ammonia     (<30)  umol/L














  03/16/20 03/16/20 03/17/20 Range/Units





  16:32 20:19 04:30 


 


WBC    3.6 L  (3.8-10.6)  k/uL


 


RBC    3.63 L  (3.80-5.40)  m/uL


 


Hgb    9.1 L  (11.4-16.0)  gm/dL


 


Hct    29.5 L  (34.0-46.0)  %


 


MCHC    30.9 L  (31.0-37.0)  g/dL


 


RDW    17.6 H  (11.5-15.5)  %


 


Plt Count    63 L  (150-450)  k/uL


 


Lymphocytes #    0.5 L  (1.0-4.8)  k/uL


 


Chloride     ()  mmol/L


 


Carbon Dioxide     (22-30)  mmol/L


 


BUN     (7-17)  mg/dL


 


Creatinine     (0.52-1.04)  mg/dL


 


Glucose     (74-99)  mg/dL


 


POC Glucose (mg/dL)  203 H  242 H   (75-99)  mg/dL


 


Calcium     (8.4-10.2)  mg/dL


 


Ammonia     (<30)  umol/L














  03/17/20 03/17/20 03/17/20 Range/Units





  04:30 06:21 08:19 


 


WBC     (3.8-10.6)  k/uL


 


RBC     (3.80-5.40)  m/uL


 


Hgb     (11.4-16.0)  gm/dL


 


Hct     (34.0-46.0)  %


 


MCHC     (31.0-37.0)  g/dL


 


RDW     (11.5-15.5)  %


 


Plt Count     (150-450)  k/uL


 


Lymphocytes #     (1.0-4.8)  k/uL


 


Chloride  109 H    ()  mmol/L


 


Carbon Dioxide  21 L    (22-30)  mmol/L


 


BUN  38 H    (7-17)  mg/dL


 


Creatinine  1.25 H    (0.52-1.04)  mg/dL


 


Glucose  192 H    (74-99)  mg/dL


 


POC Glucose (mg/dL)   181 H   (75-99)  mg/dL


 


Calcium  8.2 L    (8.4-10.2)  mg/dL


 


Ammonia    68 H  (<30)  umol/L














  03/17/20 Range/Units





  08:22 


 


WBC   (3.8-10.6)  k/uL


 


RBC   (3.80-5.40)  m/uL


 


Hgb   (11.4-16.0)  gm/dL


 


Hct   (34.0-46.0)  %


 


MCHC   (31.0-37.0)  g/dL


 


RDW   (11.5-15.5)  %


 


Plt Count   (150-450)  k/uL


 


Lymphocytes #   (1.0-4.8)  k/uL


 


Chloride   ()  mmol/L


 


Carbon Dioxide   (22-30)  mmol/L


 


BUN   (7-17)  mg/dL


 


Creatinine   (0.52-1.04)  mg/dL


 


Glucose   (74-99)  mg/dL


 


POC Glucose (mg/dL)  185 H  (75-99)  mg/dL


 


Calcium   (8.4-10.2)  mg/dL


 


Ammonia   (<30)  umol/L








                      Microbiology - Last 24 Hours (Table)











 03/15/20 12:00 Urine Culture - Final





 Urine,Catheterized 


 


 03/15/20 16:00 Blood Culture - Preliminary





 Blood    No Growth after 24 hours














Assessment and Plan


Assessment: 





Impression:


Hypotension, most likely hypovolemic in nature.  Related to high volume 

paracentesis.  Resolved.


Nonalcoholic liver cirrhosis, status post high-volume paracentesis on 3/10/20.


Paroxysmal atrial fibrillation presently in normal sinus rhythm.


Positive troponins, possible non-ST elevation myocardial infarction.


Liver cirrhosis and portal hypertension with mild coagulopathy.


Type 2 diabetes.


History of breast cancer and previous left lumpectomy


Peripheral neuropathy recommended 2 diabetes.


Acute kidney injury most likely secondary to hypotension and acute tubular 

necrosis.  Improving slowly.





Recommendation:


Patient is now off norepinephrine.


Continue IV heparin.


Continue lactulose.


Continue Florinef.


Continue oral bicarb.


Transfer patient out of the ICU to a regular medical floor


We'll continue to follow.





Time with Patient: Less than 30

## 2020-03-17 NOTE — P.PN
Subjective





Patient is seen in follow for acute kidney injury.  Renal function is improved. 

She is currently maintained on normal saline at 50 mL an hour.  Urine output 

about 40-60 mL an hour.  Not on vasopressors at this time.  Oral intake is 

gradually improving.





Vital signs are stable.


General: The patient appeared well nourished and normally developed. 


HEENT: Head exam is unremarkable. Neck is without jugular venous distension.


LUNGS: Lungs are clear to auscultation and percussion. Breath sounds decreased.


HEART: Rate and Rhythm are regular. First and second heart sounds normal. No 

murmurs, rubs or gallops. 


ABDOMEN: Bowel sounds present.  Nontender.


EXTREMITITES: 1+ edema.





Objective





- Vital Signs


Vital signs: 


                                   Vital Signs











Temp  98.0 F   03/17/20 08:00


 


Pulse  78   03/17/20 08:00


 


Resp  17   03/17/20 08:00


 


BP  113/48   03/17/20 05:00


 


Pulse Ox  94 L  03/17/20 08:00








                                 Intake & Output











 03/16/20 03/17/20 03/17/20





 18:59 06:59 18:59


 


Intake Total 9291.069 4519 350


 


Output Total 570 795 120


 


Balance 489.178 205 230


 


Weight  97.3 kg 


 


Intake:   


 


   550 150


 


    0.9 @ 50 500 550 100


 


    Dextrose 5% in Water 1, 150  





    000 ml @ 75 mls/hr IV .   





    [id number] KHUSHI with Sodium   





    Bicarb (1 Meq/ml) 150 ml   





    Rx#:[id number]   


 


    cefTRIAXone 1 gm In 50  50





    Sodium Chloride 0.9% 50   





    ml @ 100 mls/hr IVPB   





    Q24HR KHUSHI Rx#:[id number]   


 


  Intake, IV Titration 359.178  





  Amount   


 


    Heparin Sod,Pork in 0.45% 206.347  





    NaCl 25,000 unit In 0.45   





    % NaCl 1 250ml.bag @ 12   





    UNITS/KG/HR 9.998 mls/hr   





    IV .Q24H KHUSHI Rx#:   





    [id number]   


 


    Insulin Regular 100 unit 51.839  





    In Sodium Chloride 0.9%   





    100 ml @ Per Protocol IV   





    .Q0M KHUSHI Rx#:[id number]   


 


    Norepinephrine 4 mg In 100.992  





    Sodium Chloride 0.9% 250   





    ml @ 0.05 MCG/KG/MIN 15.   





    872 mls/hr IV .Q16H1M KHUSHI   





    Rx#:[id number]   


 


  Oral  450 200


 


Output:   


 


  Urine 570 795 120


 


Other:   


 


  Voiding Method Indwelling Catheter Indwelling Catheter Indwelling Catheter








                       ABP, PAP, CO, CI - Last Documented











Arterial Blood Pressure        132/45

















- Labs


CBC & Chem 7: 


                                 03/17/20 04:30





                                 03/17/20 04:30


Labs: 


                  Abnormal Lab Results - Last 24 Hours (Table)











  03/16/20 03/16/20 03/16/20 Range/Units





  08:53 10:58 11:51 


 


WBC     (3.8-10.6)  k/uL


 


RBC     (3.80-5.40)  m/uL


 


Hgb     (11.4-16.0)  gm/dL


 


Hct     (34.0-46.0)  %


 


MCHC     (31.0-37.0)  g/dL


 


RDW     (11.5-15.5)  %


 


Plt Count     (150-450)  k/uL


 


Lymphocytes #     (1.0-4.8)  k/uL


 


Chloride     ()  mmol/L


 


Carbon Dioxide     (22-30)  mmol/L


 


BUN     (7-17)  mg/dL


 


Creatinine     (0.52-1.04)  mg/dL


 


Glucose     (74-99)  mg/dL


 


POC Glucose (mg/dL)  159 H  175 H  162 H  (75-99)  mg/dL


 


Calcium     (8.4-10.2)  mg/dL














  03/16/20 03/16/20 03/16/20 Range/Units





  13:07 14:03 16:32 


 


WBC     (3.8-10.6)  k/uL


 


RBC     (3.80-5.40)  m/uL


 


Hgb     (11.4-16.0)  gm/dL


 


Hct     (34.0-46.0)  %


 


MCHC     (31.0-37.0)  g/dL


 


RDW     (11.5-15.5)  %


 


Plt Count     (150-450)  k/uL


 


Lymphocytes #     (1.0-4.8)  k/uL


 


Chloride     ()  mmol/L


 


Carbon Dioxide     (22-30)  mmol/L


 


BUN     (7-17)  mg/dL


 


Creatinine     (0.52-1.04)  mg/dL


 


Glucose     (74-99)  mg/dL


 


POC Glucose (mg/dL)  119 H  113 H  203 H  (75-99)  mg/dL


 


Calcium     (8.4-10.2)  mg/dL














  03/16/20 03/17/20 03/17/20 Range/Units





  20:19 04:30 04:30 


 


WBC   3.6 L   (3.8-10.6)  k/uL


 


RBC   3.63 L   (3.80-5.40)  m/uL


 


Hgb   9.1 L   (11.4-16.0)  gm/dL


 


Hct   29.5 L   (34.0-46.0)  %


 


MCHC   30.9 L   (31.0-37.0)  g/dL


 


RDW   17.6 H   (11.5-15.5)  %


 


Plt Count   63 L   (150-450)  k/uL


 


Lymphocytes #   0.5 L   (1.0-4.8)  k/uL


 


Chloride    109 H  ()  mmol/L


 


Carbon Dioxide    21 L  (22-30)  mmol/L


 


BUN    38 H  (7-17)  mg/dL


 


Creatinine    1.25 H  (0.52-1.04)  mg/dL


 


Glucose    192 H  (74-99)  mg/dL


 


POC Glucose (mg/dL)  242 H    (75-99)  mg/dL


 


Calcium    8.2 L  (8.4-10.2)  mg/dL














  03/17/20 03/17/20 Range/Units





  06:21 08:22 


 


WBC    (3.8-10.6)  k/uL


 


RBC    (3.80-5.40)  m/uL


 


Hgb    (11.4-16.0)  gm/dL


 


Hct    (34.0-46.0)  %


 


MCHC    (31.0-37.0)  g/dL


 


RDW    (11.5-15.5)  %


 


Plt Count    (150-450)  k/uL


 


Lymphocytes #    (1.0-4.8)  k/uL


 


Chloride    ()  mmol/L


 


Carbon Dioxide    (22-30)  mmol/L


 


BUN    (7-17)  mg/dL


 


Creatinine    (0.52-1.04)  mg/dL


 


Glucose    (74-99)  mg/dL


 


POC Glucose (mg/dL)  181 H  185 H  (75-99)  mg/dL


 


Calcium    (8.4-10.2)  mg/dL








                      Microbiology - Last 24 Hours (Table)











 03/15/20 12:00 Urine Culture - Final





 Urine,Catheterized 


 


 03/15/20 16:00 Blood Culture - Preliminary





 Blood    No Growth after 24 hours














Assessment and Plan


Plan: 





Assessment:


1.  Acute kidney injury secondary to ATN secondary to hypotension and 

hemodynamic instability.  Renal function better.  Creatinine 1.25 today.


2.  Metabolic acidosis secondary to acute kidney injury.  Status post bicarb 

drip.  Now on oral bicarbonate.  Stable.


3.  Nonalcoholic cirrhosis status post paracentesis on March 10 with 3.2 L 

drained.


4.  A. fib with RVR maintained on Lopressor.


5.  Volume overload.





Plan:


Hep-Lock IV fluids.


Encouraged oral intake.


Avoid nephrotoxins.


Maintain oral sodium bicarbonate.


Increase Aldactone to 50 mg twice daily.


Lasix 40 mg IV once today.

## 2020-03-18 VITALS — RESPIRATION RATE: 16 BRPM

## 2020-03-18 LAB
ANION GAP SERPL CALC-SCNC: 11 MMOL/L
BUN SERPL-SCNC: 36 MG/DL (ref 7–17)
CALCIUM SPEC-MCNC: 8.6 MG/DL (ref 8.4–10.2)
CHLORIDE SERPL-SCNC: 109 MMOL/L (ref 98–107)
CO2 SERPL-SCNC: 19 MMOL/L (ref 22–30)
GLUCOSE BLD-MCNC: 106 MG/DL (ref 75–99)
GLUCOSE BLD-MCNC: 142 MG/DL (ref 75–99)
GLUCOSE BLD-MCNC: 144 MG/DL (ref 75–99)
GLUCOSE BLD-MCNC: 151 MG/DL (ref 75–99)
GLUCOSE SERPL-MCNC: 144 MG/DL (ref 74–99)
MAGNESIUM SPEC-SCNC: 1.4 MG/DL (ref 1.6–2.3)
POTASSIUM SERPL-SCNC: 4.1 MMOL/L (ref 3.5–5.1)
SODIUM SERPL-SCNC: 139 MMOL/L (ref 137–145)

## 2020-03-18 RX ADMIN — FLUDROCORTISONE ACETATE SCH MG: 0.1 TABLET ORAL at 21:08

## 2020-03-18 RX ADMIN — NOREPINEPHRINE BITARTRATE SCH: 1 INJECTION, SOLUTION, CONCENTRATE INTRAVENOUS at 13:38

## 2020-03-18 RX ADMIN — GABAPENTIN SCH MG: 100 CAPSULE ORAL at 17:45

## 2020-03-18 RX ADMIN — INSULIN ASPART SCH UNIT: 100 INJECTION, SOLUTION INTRAVENOUS; SUBCUTANEOUS at 08:43

## 2020-03-18 RX ADMIN — Medication SCH MG: at 13:10

## 2020-03-18 RX ADMIN — MAGNESIUM SULFATE IN DEXTROSE SCH MLS/HR: 10 INJECTION, SOLUTION INTRAVENOUS at 13:10

## 2020-03-18 RX ADMIN — Medication SCH MG: at 21:06

## 2020-03-18 RX ADMIN — METOPROLOL TARTRATE SCH MG: 25 TABLET, FILM COATED ORAL at 08:40

## 2020-03-18 RX ADMIN — PANTOPRAZOLE SODIUM SCH MG: 40 INJECTION, POWDER, FOR SOLUTION INTRAVENOUS at 08:41

## 2020-03-18 RX ADMIN — INSULIN ASPART SCH UNIT: 100 INJECTION, SOLUTION INTRAVENOUS; SUBCUTANEOUS at 17:45

## 2020-03-18 RX ADMIN — INSULIN ASPART SCH: 100 INJECTION, SOLUTION INTRAVENOUS; SUBCUTANEOUS at 12:27

## 2020-03-18 RX ADMIN — GABAPENTIN SCH MG: 100 CAPSULE ORAL at 21:06

## 2020-03-18 RX ADMIN — Medication SCH MG: at 08:40

## 2020-03-18 RX ADMIN — GABAPENTIN SCH MG: 100 CAPSULE ORAL at 08:40

## 2020-03-18 RX ADMIN — INSULIN ASPART SCH UNIT: 100 INJECTION, SOLUTION INTRAVENOUS; SUBCUTANEOUS at 13:10

## 2020-03-18 RX ADMIN — APIXABAN SCH MG: 5 TABLET, FILM COATED ORAL at 21:06

## 2020-03-18 RX ADMIN — Medication SCH UNIT: at 13:41

## 2020-03-18 RX ADMIN — FUROSEMIDE SCH MG: 40 TABLET ORAL at 10:54

## 2020-03-18 RX ADMIN — PIOGLITAZONE SCH MG: 30 TABLET ORAL at 08:49

## 2020-03-18 RX ADMIN — INSULIN ASPART SCH UNIT: 100 INJECTION, SOLUTION INTRAVENOUS; SUBCUTANEOUS at 21:06

## 2020-03-18 RX ADMIN — MAGNESIUM SULFATE IN DEXTROSE SCH MLS/HR: 10 INJECTION, SOLUTION INTRAVENOUS at 10:54

## 2020-03-18 RX ADMIN — INSULIN DETEMIR SCH UNIT: 100 INJECTION, SOLUTION SUBCUTANEOUS at 21:06

## 2020-03-18 RX ADMIN — LINAGLIPTIN SCH MG: 5 TABLET, FILM COATED ORAL at 08:49

## 2020-03-18 RX ADMIN — LACTULOSE SCH GM: 20 SOLUTION ORAL at 08:41

## 2020-03-18 RX ADMIN — FLUDROCORTISONE ACETATE SCH MG: 0.1 TABLET ORAL at 08:49

## 2020-03-18 RX ADMIN — Medication SCH MG: at 08:49

## 2020-03-18 RX ADMIN — SPIRONOLACTONE SCH MG: 25 TABLET, FILM COATED ORAL at 08:40

## 2020-03-18 RX ADMIN — CYANOCOBALAMIN TAB 500 MCG SCH MCG: 500 TAB at 08:40

## 2020-03-18 RX ADMIN — METOPROLOL TARTRATE SCH MG: 25 TABLET, FILM COATED ORAL at 21:05

## 2020-03-18 RX ADMIN — Medication SCH MG: at 17:45

## 2020-03-18 RX ADMIN — INSULIN DETEMIR SCH UNIT: 100 INJECTION, SOLUTION SUBCUTANEOUS at 08:48

## 2020-03-18 RX ADMIN — APIXABAN SCH MG: 5 TABLET, FILM COATED ORAL at 08:40

## 2020-03-18 RX ADMIN — SPIRONOLACTONE SCH MG: 25 TABLET, FILM COATED ORAL at 21:05

## 2020-03-18 RX ADMIN — ASPIRIN 81 MG CHEWABLE TABLET SCH MG: 81 TABLET CHEWABLE at 08:40

## 2020-03-18 RX ADMIN — SERTRALINE HYDROCHLORIDE SCH MG: 50 TABLET, FILM COATED ORAL at 08:40

## 2020-03-18 NOTE — PN
PROGRESS NOTE



DATE OF SERVICE:

03/18/2020



Patient is a 79-year-old pleasant white female admitted to the hospital with ascites

secondary to nonalcoholic cirrhosis of the liver and portal hypertension.  While in the

hospital developed atrial fibrillation, hypertension, and was transferred to the

intensive care unit, has been on IV heparin.  She was transferred to the floor last

night.  Presently on Eliquis that was started today.  Patient denies any symptoms.  No

abdominal distention.  No lower extremity swelling.  No abdominal pain.



PHYSICAL EXAMINATION:

Appears comfortable.  Vital signs are stable.  Blood pressure is 95/35, pulse 81,

temperature 98.

HEENT:  Examination unremarkable, conjunctivae are pink, sclerae nonicteric, oral

cavity no lesions.

NECK:  No JVD or lymph node enlargement.

CHEST:  Clear to auscultation.

HEART:  Regular rate and rhythm.

ABDOMEN:  Soft, nontender.  There was no free fluid noted.

EXTREMITIES:  No pedal edema.

SKIN:  No rashes.

NEURO:  She is alert and oriented x3.  No focal deficits.



LABS:

The BUN is 36, creatinine 1.33, hemoglobin 9.1, platelets 63,000 and WBC 3.6.



IMPRESSION:

1. Nonalcoholic cirrhosis of the liver with portal hypertension, ascites, had

    paracentesis a week ago, 3 L of fluid was removed.  Now, she is stable.

2. Pancytopenia secondary to portal hypertension/hypersplenism.

3. Atrial fibrillation on Eliquis.

4. Mild increase in BUN, creatinine.



RECOMMENDATION:

1. Continue with Aldactone 50 mg twice daily and Lasix 40 mg daily.

2. Low-salt diet.

3. Monitor CBC daily.

4. Patient was advised to follow up in the office in 2-3 weeks following discharge

    from the hospital.

Thank you for this consultation.





MMODL / IJN: [id number] / Job#: [id number]

## 2020-03-18 NOTE — P.PN
Subjective





Patient is seen in follow for acute kidney injury.  Renal function is fairly 

stable.  She is off IV fluids and is maintained on Aldactone.  She received a 

dose of IV Lasix yesterday.  Denies chest pain or shortness of breath.  No 

vomiting or diarrhea.





Vital signs are stable.


General: The patient appeared well nourished and normally developed. 


HEENT: Head exam is unremarkable. Neck is without jugular venous distension.


LUNGS: Lungs are clear to auscultation and percussion. Breath sounds decreased.


HEART: Rate and Rhythm are regular. First and second heart sounds normal. No 

murmurs, rubs or gallops. 


ABDOMEN: Bowel sounds present.  Nontender.


EXTREMITITES: 1+ edema.





Objective





- Vital Signs


Vital signs: 


                                   Vital Signs











Temp  97.9 F   03/18/20 04:58


 


Pulse  89   03/18/20 04:58


 


Resp  16   03/18/20 04:58


 


BP  116/61   03/18/20 04:58


 


Pulse Ox  95   03/18/20 04:58








                                 Intake & Output











 03/17/20 03/18/20 03/18/20





 18:59 06:59 18:59


 


Intake Total 500 1180 


 


Output Total 855  


 


Balance -355 1180 


 


Weight 97.3 kg  


 


Intake:   


 


    


 


    0.9 @ 50 100  


 


    cefTRIAXone 1 gm In 50  





    Sodium Chloride 0.9% 50   





    ml @ 100 mls/hr IVPB   





    Q24HR formerly [facility] Rx#:[id number]   


 


  Oral 350 1180 


 


Output:   


 


  Urine 855  


 


Other:   


 


  Voiding Method Indwelling Catheter  


 


  # Voids 2 3 








                       ABP, PAP, CO, CI - Last Documented











Arterial Blood Pressure        137/44

















- Labs


CBC & Chem 7: 


                                 03/17/20 04:30





                                 03/18/20 06:57


Labs: 


                  Abnormal Lab Results - Last 24 Hours (Table)











  03/17/20 03/17/20 03/17/20 Range/Units





  12:02 17:28 19:57 


 


Chloride     ()  mmol/L


 


Carbon Dioxide     (22-30)  mmol/L


 


BUN     (7-17)  mg/dL


 


Creatinine     (0.52-1.04)  mg/dL


 


Glucose     (74-99)  mg/dL


 


POC Glucose (mg/dL)  135 H  131 H  118 H  (75-99)  mg/dL


 


Magnesium     (1.6-2.3)  mg/dL














  03/18/20 03/18/20 Range/Units





  06:54 06:57 


 


Chloride   109 H  ()  mmol/L


 


Carbon Dioxide   19 L  (22-30)  mmol/L


 


BUN   36 H  (7-17)  mg/dL


 


Creatinine   1.33 H  (0.52-1.04)  mg/dL


 


Glucose   144 H  (74-99)  mg/dL


 


POC Glucose (mg/dL)  151 H   (75-99)  mg/dL


 


Magnesium   1.4 L  (1.6-2.3)  mg/dL








                      Microbiology - Last 24 Hours (Table)











 03/15/20 16:00 Blood Culture - Preliminary





 Blood    No Growth after 48 hours














Assessment and Plan


Plan: 





Assessment:


1.  Acute kidney injury secondary to ATN secondary to hypotension and 

hemodynamic instability.  Renal function fairly stable.


2.  Metabolic acidosis secondary to acute kidney injury.  Status post bicarb 

drip.  Maintained on oral bicarbonate.


3.  Nonalcoholic cirrhosis status post paracentesis on March 10 with 3.2 L 

drained.


4.  A. fib with RVR maintained on Lopressor.


5.  Volume overload.


6.  Hypomagnesemia secondary to diuresis.





Plan:


Maintain Aldactone.


Start Lasix 40 mg orally once daily.


Replace magnesium.  2 g IV today.


Encouraged oral intake.


Avoid nephrotoxins.


Maintain oral sodium bicarbonate.


Repeat electrolytes in the morning.

## 2020-03-18 NOTE — CDI
Documentation Clarification Form



Date: 03/18/2020 01:53:19 PM

From: Elizabet Rush

Phone: [phone]

MRN: [id number]

Admit Date: 03/11/2020 01:50:00 PM

Patient Name: Jessica Samaniego

Visit Number: [id number]

Discharge Date:  





ATTENTION: The Clinical Documentation Specialists (CDI) and [facility] Coding Staff 
appreciate your assistance in clarifying documentation. Please respond to the 
clarification below the line at the bottom and electronically sign. The CDI & 
[facility] Coding staff will review the response and follow-up if needed. Please note: 
Queries are made part of the Legal Health Record. If you have any questions, 
please contact the author of this message via ITS.



Dr. Constantino hCandler





CKD is documented in the Nephrology Consult 3/15/20



History/Risk Factors: 79-year-old female presents to the ED with evaluation of 
dyspnea, chest discomfort and abdominal distention with nausea and vomiting. 
Medical history includes nonalcoholic cirrhosis of the liver, DM, HTN, chronic 
liver disease.



     Patients Historical BUN 9, CR 0.57, GFR >90 - found in [facility] 11/9/2019
   

Clinical Indicators: 

    Current BUN 36, CR 1.33, GFR 39 - found in [facility] 03/18/2020

Treatment: Lasix IV d/cd 3/11, 3/11 1,000mls @ 83.32 mls/hr iv Q 12 Hs, 3/12 
0.9ns 1,000mls @ 999 mls/hr Iv Q 1 H, 3/15 0.9ns 75cchr dcd 3/16, 3/15 1,000mls
@ 500mls/hr iv Q 13hr mady, 3/15 Sodium bicarbonate po qid  

Per Nephrology Consult 3/15 This is a 79-year-old female seen in consultation 
because of acute injury and chronic kidney disease.



In order to capture the severity of condition, please clarify the stage of the 
CKD, if known:



CKD Ruled Out 

CKD Stage 1 (GFR > 90)

CKD Stage 2 (GFR 60-89)

CKD Stage 3 (GFR 30-59)

Other, please specify ___________

Unable to determine



(Last Revision: February 2020)

_________________no 
ckd__________________________________________________________



MTDD

## 2020-03-18 NOTE — P.PN
Subjective





This is a pleasant 79 years old female with multiple medical problems presented 

with recurrent ascites and related to her liver disease and cirrhosis, she 

underwent large volume paracentesis were 3 L of fluid were removed on 3/10 

followed by drop in blood pressure, patient is been followed closely by 

pulmonary/critical care team and nephrology team and her blood pressure is more 

stable currently and this morning was 95//61, with heart rate , 

cardiology were following the patient and cardiac cath could not be done because

of worsening renal function, patient has been followed also by nephrologist her 

creatinine is still slightly elevated at 1.33, compared to 1.43 upon admission, 

patient is followed by Dr. rodney the nephrologist who recommended Lasix 40 mg 

by mouth daily with replacing electrolytes.


as per staff ,cardiology team has cleared pt for discharge


pt has copay for eliquis of $47.00 per sw and pt agrees to pay it





Objective





- Vital Signs


Vital signs: 


                                   Vital Signs











Temp  98 F   03/18/20 11:45


 


Pulse  110 H  03/18/20 16:00


 


Resp  18   03/18/20 16:00


 


BP  95/55   03/18/20 11:45


 


Pulse Ox  95   03/18/20 11:45








                                 Intake & Output











 03/18/20 03/18/20 03/19/20





 06:59 18:59 06:59


 


Intake Total 1180 400 


 


Balance 1180 400 


 


Intake:   


 


  IV  400 


 


    0.9 @ 50  400 


 


  Oral 1180  


 


Other:   


 


  Voiding Method  Indwelling Catheter 


 


  # Voids 3  








                       ABP, PAP, CO, CI - Last Documented











Arterial Blood Pressure        137/44

















- Exam





GENERAL: The patient is alert and oriented x3, not in any acute distress. Well 

developed, well nourished. 


HEENT: Pupils are round and equally reacting to light. EOMI. No scleral icterus.

No conjunctival pallor. Normocephalic, atraumatic. No pharyngeal erythema. No 

thyromegaly. 


CARDIOVASCULAR: S1 and S2 present. No murmurs, rubs, or gallops. 


PULMONARY: Chest is clear to auscultation, no wheezing or crackles. 


ABDOMEN: Soft, nontender, nondistended, normoactive bowel sounds. No palpable 

organomegaly. 


MUSCULOSKELETAL: No joint swelling or deformity. 


EXTREMITIES: No cyanosis, clubbing, or pedal edema. 


NEUROLOGICAL: Gross neurological examination did not reveal any focal deficits. 


SKIN: No rashes.





- Labs


CBC & Chem 7: 


                                 03/17/20 04:30





                                 03/18/20 06:57


Labs: 


                  Abnormal Lab Results - Last 24 Hours (Table)











  03/18/20 03/18/20 03/18/20 Range/Units





  06:54 06:57 11:29 


 


Chloride   109 H   ()  mmol/L


 


Carbon Dioxide   19 L   (22-30)  mmol/L


 


BUN   36 H   (7-17)  mg/dL


 


Creatinine   1.33 H   (0.52-1.04)  mg/dL


 


Glucose   144 H   (74-99)  mg/dL


 


POC Glucose (mg/dL)  151 H   106 H  (75-99)  mg/dL


 


Magnesium   1.4 L   (1.6-2.3)  mg/dL














  03/18/20 03/18/20 Range/Units





  16:55 19:48 


 


Chloride    ()  mmol/L


 


Carbon Dioxide    (22-30)  mmol/L


 


BUN    (7-17)  mg/dL


 


Creatinine    (0.52-1.04)  mg/dL


 


Glucose    (74-99)  mg/dL


 


POC Glucose (mg/dL)  142 H  144 H  (75-99)  mg/dL


 


Magnesium    (1.6-2.3)  mg/dL








                      Microbiology - Last 24 Hours (Table)











 03/15/20 16:00 Blood Culture - Preliminary





 Blood    No Growth after 72 hours














Assessment and Plan


Assessment: 





Possible non-STEMI with elevated troponin with abnormal stress test


Hypovolemia secondary to large volume paracentesis were 3 L were removed on 3/10


Paroxysmal atrial fibrillation


Recurrent ascites and cirrhosis


Chronic congestive heart failure, was suspicious exacerbation, improved


Acute urinary tract infection, present on admission


Acute renal failure, improved


Type 2 diabetes mellitus


Obesity


Hypertension


Chronic liver disease


Chronic thrombocytopenia


Plan: 





this is a pleasant 80 yo F who presents with hypotension, ascitis, and NSTEMI, 

pt is been followed by several consultants including cardiology pulmonary , 

nephrology and GI team , pt is doing well clinically , cardiology already 

cleared pt for discharge and to c/w medical management, sugar is controlled , 

c/w ceftriaxone


pt feels she can go home , c/w aspirin 


Labs and medication were reviewed..  Continue same treatment.  Continue with 

symptomatic treatment.  Resume home medication.  Monitor lytes and vitals.  DVT 

and GI prophylaxis.  Further recommendations of the clinical course of the 

patient


DVT prophylaxis: eliquis


GI Prophylaxis: Ppi





possible dc in 24-48 hrs

## 2020-03-18 NOTE — XR
EXAMINATION TYPE: XR chest 1V portable

 

DATE OF EXAM: 3/18/2020

 

COMPARISON: Prior chest x-ray 3/17/2020

 

HISTORY: Ascites, abnormal chest x-ray

 

TECHNIQUE: Single frontal view of the chest is obtained.

 

FINDINGS:  The left hemidiaphragm remains obscured, interval improved visualization of the right jack
diaphragm. Surgical clips present left axilla. Heart is obscured but thought to be enlarged. Central 
vascularity is increased, no evident pneumothorax. Lung volumes are low. Aorta is dense.

 

IMPRESSION:  Possible left lower lobe atelectasis versus edema or pneumonia and associated effusion. 
There is some improvement in aeration at the right lung base.

## 2020-03-19 LAB
ANION GAP SERPL CALC-SCNC: 6 MMOL/L
BUN SERPL-SCNC: 38 MG/DL (ref 7–17)
CALCIUM SPEC-MCNC: 8.9 MG/DL (ref 8.4–10.2)
CHLORIDE SERPL-SCNC: 110 MMOL/L (ref 98–107)
CO2 SERPL-SCNC: 21 MMOL/L (ref 22–30)
GLUCOSE BLD-MCNC: 139 MG/DL (ref 75–99)
GLUCOSE BLD-MCNC: 253 MG/DL (ref 75–99)
GLUCOSE BLD-MCNC: 285 MG/DL (ref 75–99)
GLUCOSE BLD-MCNC: 315 MG/DL (ref 75–99)
GLUCOSE BLD-MCNC: 63 MG/DL (ref 75–99)
GLUCOSE BLD-MCNC: 68 MG/DL (ref 75–99)
GLUCOSE BLD-MCNC: 74 MG/DL (ref 75–99)
GLUCOSE SERPL-MCNC: 68 MG/DL (ref 74–99)
MAGNESIUM SPEC-SCNC: 1.7 MG/DL (ref 1.6–2.3)
POTASSIUM SERPL-SCNC: 3.8 MMOL/L (ref 3.5–5.1)
SODIUM SERPL-SCNC: 137 MMOL/L (ref 137–145)

## 2020-03-19 RX ADMIN — INSULIN ASPART SCH UNIT: 100 INJECTION, SOLUTION INTRAVENOUS; SUBCUTANEOUS at 21:09

## 2020-03-19 RX ADMIN — INSULIN ASPART SCH: 100 INJECTION, SOLUTION INTRAVENOUS; SUBCUTANEOUS at 07:06

## 2020-03-19 RX ADMIN — SPIRONOLACTONE SCH MG: 25 TABLET, FILM COATED ORAL at 22:22

## 2020-03-19 RX ADMIN — Medication SCH MG: at 12:40

## 2020-03-19 RX ADMIN — PIOGLITAZONE SCH MG: 30 TABLET ORAL at 08:09

## 2020-03-19 RX ADMIN — Medication SCH MG: at 08:10

## 2020-03-19 RX ADMIN — Medication SCH MG: at 09:43

## 2020-03-19 RX ADMIN — APIXABAN SCH MG: 5 TABLET, FILM COATED ORAL at 21:09

## 2020-03-19 RX ADMIN — GLIMEPIRIDE SCH MG: 2 TABLET ORAL at 18:19

## 2020-03-19 RX ADMIN — Medication SCH UNIT: at 08:09

## 2020-03-19 RX ADMIN — SPIRONOLACTONE SCH MG: 25 TABLET, FILM COATED ORAL at 08:08

## 2020-03-19 RX ADMIN — APIXABAN SCH MG: 5 TABLET, FILM COATED ORAL at 08:07

## 2020-03-19 RX ADMIN — Medication SCH MG: at 21:09

## 2020-03-19 RX ADMIN — METOPROLOL TARTRATE SCH MG: 25 TABLET, FILM COATED ORAL at 08:07

## 2020-03-19 RX ADMIN — CYANOCOBALAMIN TAB 500 MCG SCH MCG: 500 TAB at 08:08

## 2020-03-19 RX ADMIN — GABAPENTIN SCH MG: 100 CAPSULE ORAL at 21:10

## 2020-03-19 RX ADMIN — LACTULOSE SCH GM: 20 SOLUTION ORAL at 08:06

## 2020-03-19 RX ADMIN — GABAPENTIN SCH MG: 100 CAPSULE ORAL at 16:45

## 2020-03-19 RX ADMIN — GABAPENTIN SCH MG: 100 CAPSULE ORAL at 08:08

## 2020-03-19 RX ADMIN — INSULIN ASPART SCH UNIT: 100 INJECTION, SOLUTION INTRAVENOUS; SUBCUTANEOUS at 12:00

## 2020-03-19 RX ADMIN — FLUDROCORTISONE ACETATE SCH MG: 0.1 TABLET ORAL at 08:07

## 2020-03-19 RX ADMIN — SERTRALINE HYDROCHLORIDE SCH MG: 50 TABLET, FILM COATED ORAL at 08:09

## 2020-03-19 RX ADMIN — METOPROLOL TARTRATE SCH MG: 25 TABLET, FILM COATED ORAL at 21:10

## 2020-03-19 RX ADMIN — FUROSEMIDE SCH MG: 40 TABLET ORAL at 08:09

## 2020-03-19 RX ADMIN — LINAGLIPTIN SCH MG: 5 TABLET, FILM COATED ORAL at 08:09

## 2020-03-19 RX ADMIN — ASPIRIN 81 MG CHEWABLE TABLET SCH MG: 81 TABLET CHEWABLE at 08:09

## 2020-03-19 RX ADMIN — FLUDROCORTISONE ACETATE SCH MG: 0.1 TABLET ORAL at 22:23

## 2020-03-19 RX ADMIN — INSULIN ASPART SCH UNIT: 100 INJECTION, SOLUTION INTRAVENOUS; SUBCUTANEOUS at 17:20

## 2020-03-19 RX ADMIN — PANTOPRAZOLE SODIUM SCH MG: 40 TABLET, DELAYED RELEASE ORAL at 08:09

## 2020-03-19 RX ADMIN — Medication SCH MG: at 17:20

## 2020-03-19 NOTE — P.PN
Subjective





This is a pleasant 79 years old female with multiple medical problems presented 

with recurrent ascites and related to her liver disease and cirrhosis, she 

underwent large volume paracentesis were 3 L of fluid were removed on 3/10 

followed by drop in blood pressure, patient is been followed closely by 

pulmonary/critical care team and nephrology team and her blood pressure is more 

stable currently and this morning was 95//61, with heart rate , 

cardiology were following the patient and cardiac cath could not be done because

of worsening renal function, patient has been followed also by nephrologist her 

creatinine is still slightly elevated at 1.33, compared to 1.43 upon admission, 

patient is followed by Dr. rodney the nephrologist who recommended Lasix 40 mg 

by mouth daily with replacing electrolytes.


as per staff ,cardiology team has cleared pt for discharge


pt has copay for eliquis of $47.00 per sw and pt agrees to pay it 





03/19/2020


Patient is clinically stable and well, no more dizziness or hypotension, no 

other symptoms.  However patient this morning had low sugar at 63-68, her 

insulin was held.  On withdrawing the diabetes management with the patient she 

states that she was taking only pills and she was not taking insulin before, 

patient is not sure if she can handle insulin at home and thinks that her 

 will help her, during this hospitalization she was started on Levemir 30

units twice daily and 5 units of insulin with meals, we held in all insulin and 

continuing with her medication of April Glickstein 5 mg daily (at home she was 

taking Januvia/sitagliptin 100 mg daily) and Actos 30 mg daily (home med), we 

will start sulfonylureas if her sugar was up again.  We'll keep her in the 

hospital today to monitor his sugar especially in view of her renal function 

impairment











Objective





- Vital Signs


Vital signs: 


                                   Vital Signs











Temp  98.1 F   03/19/20 05:00


 


Pulse  80   03/19/20 05:00


 


Resp  16   03/19/20 05:00


 


BP  101/56   03/19/20 05:00


 


Pulse Ox  97   03/19/20 05:00








                                 Intake & Output











 03/18/20 03/19/20 03/19/20





 18:59 06:59 18:59


 


Intake Total 400 290 


 


Balance 400 290 


 


Intake:   


 


    


 


    0.9 @ 50 400  


 


  Oral  290 


 


Other:   


 


  Voiding Method Indwelling Catheter Toilet 


 


  # Voids  2 


 


  # Bowel Movements  1 








                       ABP, PAP, CO, CI - Last Documented











Arterial Blood Pressure        137/44

















- Exam





GENERAL: The patient is alert and oriented x3, not in any acute distress. Well 

developed, well nourished. 


HEENT: Pupils are round and equally reacting to light. EOMI. No scleral icterus.

No conjunctival pallor. Normocephalic, atraumatic. No pharyngeal erythema. No 

thyromegaly. 


CARDIOVASCULAR: S1 and S2 present. No murmurs, rubs, or gallops. 


PULMONARY: Chest is clear to auscultation, no wheezing or crackles. 


ABDOMEN: Soft, nontender, nondistended, normoactive bowel sounds. No palpable o

rganomegaly. 


MUSCULOSKELETAL: No joint swelling or deformity. 


EXTREMITIES: No cyanosis, clubbing, or pedal edema. 


NEUROLOGICAL: Gross neurological examination did not reveal any focal deficits. 


SKIN: No rashes.





- Labs


CBC & Chem 7: 


                                 03/17/20 04:30





                                 03/19/20 07:06


Labs: 


                  Abnormal Lab Results - Last 24 Hours (Table)











  03/18/20 03/18/20 03/18/20 Range/Units





  11:29 16:55 19:48 


 


Chloride     ()  mmol/L


 


Carbon Dioxide     (22-30)  mmol/L


 


BUN     (7-17)  mg/dL


 


Creatinine     (0.52-1.04)  mg/dL


 


Glucose     (74-99)  mg/dL


 


POC Glucose (mg/dL)  106 H  142 H  144 H  (75-99)  mg/dL














  03/19/20 03/19/20 03/19/20 Range/Units





  07:02 07:06 07:19 


 


Chloride   110 H   ()  mmol/L


 


Carbon Dioxide   21 L   (22-30)  mmol/L


 


BUN   38 H   (7-17)  mg/dL


 


Creatinine   1.36 H   (0.52-1.04)  mg/dL


 


Glucose   68 L   (74-99)  mg/dL


 


POC Glucose (mg/dL)  68 L   63 L  (75-99)  mg/dL














  03/19/20 Range/Units





  07:33 


 


Chloride   ()  mmol/L


 


Carbon Dioxide   (22-30)  mmol/L


 


BUN   (7-17)  mg/dL


 


Creatinine   (0.52-1.04)  mg/dL


 


Glucose   (74-99)  mg/dL


 


POC Glucose (mg/dL)  74 L  (75-99)  mg/dL








                      Microbiology - Last 24 Hours (Table)











 03/15/20 16:00 Blood Culture - Preliminary





 Blood    No Growth after 72 hours














Assessment and Plan


Assessment: 





Possible non-STEMI with elevated troponin with abnormal stress test, car

diologist opted for outpatient management


Hypovolemia secondary to large volume paracentesis were 3 L were removed on 3/10


Paroxysmal atrial fibrillation


Recurrent ascites and cirrhosis


Chronic congestive heart failure, was suspicious exacerbation, improved


Acute urinary tract infection, present on admission


Acute renal failure, improved


Type 2 diabetes mellitus


Obesity


Hypertension


Chronic liver disease


Chronic thrombocytopenia


Plan: 





this is a pleasant 80 yo F who presents with hypotension, ascitis, and NSTEMI, 

pt is been followed by several consultants including cardiology pulmonary , 

nephrology and GI team , pt is doing well clinically , cardiology already clear

ed pt for discharge and to c/w medical management, sugar is controlled , c/w 

ceftriaxone for today and DC tomorrow


We'll keep the patient to monitor sugar was up and her insulin and continue with

her diabetes medicine


pt feels she can go home , c/w aspirin 


Labs and medication were reviewed..  Continue same treatment.  Continue with 

symptomatic treatment.  Resume home medication.  Monitor lytes and vitals.  DVT 

and GI prophylaxis.  Further recommendations of the clinical course of the 

patient


DVT prophylaxis: eliquis


GI Prophylaxis: Ppi





possible dc in 24-48 hrs

## 2020-03-19 NOTE — P.PN
Subjective





Patient is seen in follow for acute kidney injury.  Renal function is fairly 

stable.  She is off IV fluids and is maintained on Aldactone and Lasix.  Denies 

chest pain or shortness of breath.  No vomiting or diarrhea. Good urine output.





Vital signs are stable.


General: The patient appeared well nourished and normally developed. 


HEENT: Head exam is unremarkable. Neck is without jugular venous distension.


LUNGS: Lungs are clear to auscultation and percussion. Breath sounds decreased.


HEART: Rate and Rhythm are regular. First and second heart sounds normal. No 

murmurs, rubs or gallops. 


ABDOMEN: Bowel sounds present.  Nontender.


EXTREMITITES: 1+ edema.





Objective





- Vital Signs


Vital signs: 


                                   Vital Signs











Temp  98.1 F   03/19/20 05:00


 


Pulse  80   03/19/20 05:00


 


Resp  16   03/19/20 05:00


 


BP  101/56   03/19/20 05:00


 


Pulse Ox  97   03/19/20 05:00








                                 Intake & Output











 03/18/20 03/19/20 03/19/20





 18:59 06:59 18:59


 


Intake Total 400 290 


 


Balance 400 290 


 


Intake:   


 


    


 


    0.9 @ 50 400  


 


  Oral  290 


 


Other:   


 


  Voiding Method Indwelling Catheter Toilet 


 


  # Voids  2 


 


  # Bowel Movements  1 








                       ABP, PAP, CO, CI - Last Documented











Arterial Blood Pressure        137/44

















- Labs


CBC & Chem 7: 


                                 03/17/20 04:30





                                 03/19/20 07:06


Labs: 


                  Abnormal Lab Results - Last 24 Hours (Table)











  03/18/20 03/18/20 03/18/20 Range/Units





  11:29 16:55 19:48 


 


Chloride     ()  mmol/L


 


Carbon Dioxide     (22-30)  mmol/L


 


BUN     (7-17)  mg/dL


 


Creatinine     (0.52-1.04)  mg/dL


 


Glucose     (74-99)  mg/dL


 


POC Glucose (mg/dL)  106 H  142 H  144 H  (75-99)  mg/dL














  03/19/20 03/19/20 03/19/20 Range/Units





  07:02 07:06 07:19 


 


Chloride   110 H   ()  mmol/L


 


Carbon Dioxide   21 L   (22-30)  mmol/L


 


BUN   38 H   (7-17)  mg/dL


 


Creatinine   1.36 H   (0.52-1.04)  mg/dL


 


Glucose   68 L   (74-99)  mg/dL


 


POC Glucose (mg/dL)  68 L   63 L  (75-99)  mg/dL














  03/19/20 Range/Units





  07:33 


 


Chloride   ()  mmol/L


 


Carbon Dioxide   (22-30)  mmol/L


 


BUN   (7-17)  mg/dL


 


Creatinine   (0.52-1.04)  mg/dL


 


Glucose   (74-99)  mg/dL


 


POC Glucose (mg/dL)  74 L  (75-99)  mg/dL








                      Microbiology - Last 24 Hours (Table)











 03/15/20 16:00 Blood Culture - Preliminary





 Blood    No Growth after 72 hours














Assessment and Plan


Plan: 





Assessment:


1.  Acute kidney injury secondary to ATN secondary to hypotension and h

emodynamic instability.  Renal function fairly stable.


2.  Metabolic acidosis secondary to acute kidney injury.  Status post bicarb 

drip.  Maintained on oral bicarbonate.


3.  Nonalcoholic cirrhosis status post paracentesis on March 10 with 3.2 L 

drained.


4.  A. fib with RVR maintained on Lopressor.


5.  Volume overload.


6.  Hypomagnesemia secondary to diuresis.improved post replacement.





Plan:


Maintain Aldactone.


Continue Lasix 40 mg orally once daily.


Encouraged oral intake.


Avoid nephrotoxins.


Maintain oral sodium bicarbonate.


Repeat electrolytes in the morning.


Add oral magnesium oxide.

## 2020-03-20 VITALS — SYSTOLIC BLOOD PRESSURE: 100 MMHG | TEMPERATURE: 99 F | DIASTOLIC BLOOD PRESSURE: 40 MMHG | HEART RATE: 61 BPM

## 2020-03-20 LAB
GLUCOSE BLD-MCNC: 179 MG/DL (ref 75–99)
GLUCOSE BLD-MCNC: 204 MG/DL (ref 75–99)
GLUCOSE BLD-MCNC: 245 MG/DL (ref 75–99)

## 2020-03-20 RX ADMIN — GLIMEPIRIDE SCH MG: 2 TABLET ORAL at 08:28

## 2020-03-20 RX ADMIN — APIXABAN SCH MG: 5 TABLET, FILM COATED ORAL at 08:25

## 2020-03-20 RX ADMIN — PIOGLITAZONE SCH MG: 30 TABLET ORAL at 08:30

## 2020-03-20 RX ADMIN — INSULIN ASPART SCH: 100 INJECTION, SOLUTION INTRAVENOUS; SUBCUTANEOUS at 12:23

## 2020-03-20 RX ADMIN — FUROSEMIDE SCH MG: 40 TABLET ORAL at 08:25

## 2020-03-20 RX ADMIN — SERTRALINE HYDROCHLORIDE SCH MG: 50 TABLET, FILM COATED ORAL at 08:25

## 2020-03-20 RX ADMIN — Medication SCH MG: at 08:30

## 2020-03-20 RX ADMIN — LACTULOSE SCH GM: 20 SOLUTION ORAL at 08:27

## 2020-03-20 RX ADMIN — METOPROLOL TARTRATE SCH MG: 25 TABLET, FILM COATED ORAL at 08:25

## 2020-03-20 RX ADMIN — ASPIRIN 81 MG CHEWABLE TABLET SCH MG: 81 TABLET CHEWABLE at 08:26

## 2020-03-20 RX ADMIN — LINAGLIPTIN SCH MG: 5 TABLET, FILM COATED ORAL at 08:30

## 2020-03-20 RX ADMIN — Medication SCH MG: at 08:25

## 2020-03-20 RX ADMIN — Medication SCH UNIT: at 08:25

## 2020-03-20 RX ADMIN — FLUDROCORTISONE ACETATE SCH MG: 0.1 TABLET ORAL at 08:29

## 2020-03-20 RX ADMIN — GABAPENTIN SCH MG: 100 CAPSULE ORAL at 08:25

## 2020-03-20 RX ADMIN — Medication SCH MG: at 12:45

## 2020-03-20 RX ADMIN — SPIRONOLACTONE SCH MG: 25 TABLET, FILM COATED ORAL at 08:26

## 2020-03-20 RX ADMIN — PANTOPRAZOLE SODIUM SCH MG: 40 TABLET, DELAYED RELEASE ORAL at 08:25

## 2020-03-20 RX ADMIN — INSULIN ASPART SCH: 100 INJECTION, SOLUTION INTRAVENOUS; SUBCUTANEOUS at 08:13

## 2020-03-20 RX ADMIN — CYANOCOBALAMIN TAB 500 MCG SCH MCG: 500 TAB at 08:26

## 2020-03-20 NOTE — P.PN
Subjective





Patient is seen in follow for acute kidney injury.  Renal function is fairly 

stable.  She is off IV fluids and is maintained on Aldactone and Lasix.  Denies 

chest pain or shortness of breath.  No vomiting or diarrhea. Good urine output.





Vital signs are stable.


General: The patient appeared well nourished and normally developed. 


HEENT: Head exam is unremarkable. Neck is without jugular venous distension.


LUNGS: Lungs are clear to auscultation and percussion. Breath sounds decreased.


HEART: Rate and Rhythm are regular. First and second heart sounds normal. No 

murmurs, rubs or gallops. 


ABDOMEN: Bowel sounds present.  Nontender.


EXTREMITITES: 1+ edema.





Objective





- Vital Signs


Vital signs: 


                                   Vital Signs











Temp  99.0 F   03/20/20 05:00


 


Pulse  61   03/20/20 05:00


 


Resp  16   03/20/20 05:00


 


BP  100/40   03/20/20 05:00


 


Pulse Ox  96   03/20/20 05:00








                                 Intake & Output











 03/19/20 03/20/20 03/20/20





 18:59 06:59 18:59


 


Intake Total  150 


 


Balance  150 


 


Intake:   


 


  Oral  150 


 


Other:   


 


  Voiding Method Toilet Toilet 


 


  # Voids 3 3 








                       ABP, PAP, CO, CI - Last Documented











Arterial Blood Pressure        137/44

















- Labs


CBC & Chem 7: 


                                 03/17/20 04:30





                                 03/19/20 07:06


Labs: 


                  Abnormal Lab Results - Last 24 Hours (Table)











  03/19/20 03/19/20 03/19/20 Range/Units





  11:33 16:56 20:03 


 


POC Glucose (mg/dL)  139 H  285 H  315 H  (75-99)  mg/dL














  03/19/20 03/20/20 03/20/20 Range/Units





  21:48 02:37 06:55 


 


POC Glucose (mg/dL)  253 H  204 H  179 H  (75-99)  mg/dL














  03/20/20 Range/Units





  10:42 


 


POC Glucose (mg/dL)  245 H  (75-99)  mg/dL








                      Microbiology - Last 24 Hours (Table)











 03/15/20 16:00 Blood Culture - Preliminary





 Blood    No Growth after 96 hours














Assessment and Plan


Plan: 





Assessment:


1.  Acute kidney injury secondary to ATN secondary to hypotension and 

hemodynamic instability.  Renal function fairly stable.


2.  Metabolic acidosis secondary to acute kidney injury.  Status post bicarb 

drip.  Maintained on oral bicarbonate.


3.  Nonalcoholic cirrhosis status post paracentesis on March 10 with 3.2 L 

drained.


4.  A. fib with RVR maintained on Lopressor.


5.  Volume overload.


6.  Hypomagnesemia secondary to diuresis. Improved post replacement.  Maintained

on oral magnesium oxide.





Plan:


Maintain Aldactone.


Continue Lasix 40 mg orally once daily.


I will give her an additional dose of Lasix 40 mg IV once prior to discharge.


Encouraged oral intake.


Avoid nephrotoxins.


Stable to be discharged home from nephrology standpoint.  Repeat BMP and 

magnesium level 2-3 days postdischarge.  Follow up outpatient in the next 1-2 

weeks.

## 2020-03-20 NOTE — P.DS
Providers


Date of admission: 


03/11/20 13:50





Attending physician: 


Sly William MD





Consults: 





                                        





03/10/20 06:10


Consult Physician Urgent 


   Consulting Provider: Jose Palacios


   Consult Reason/Comments: elevated troponoin with chest pain


   Do you want consulting provider notified?: Yes





03/12/20 09:25


Consult Physician Stat 


   Consulting Provider: Claribel Real


   Consult Reason/Comments: ICU management


   Do you want consulting provider notified?: Already Contacted





03/15/20 08:14


Consult Physician Routine 


   Consulting Provider: Anneliese Arcos


   Consult Reason/Comments: Renal failure


   Do you want consulting provider notified?: Yes











Primary care physician: 


Christine Davis





Hospital Course: 








Diagnoses:


Possible non-STEMI with elevated troponin with abnormal stress test


Hypovolemia secondary to large volume paracentesis were 3 L were removed on 3/10


Paroxysmal atrial fibrillation


Recurrent ascites and cirrhosis


Chronic congestive heart failure, was suspicious exacerbation, improved


Acute urinary tract infection, present on admission


Acute renal failure, improved


Type 2 diabetes mellitus


Obesity


Hypertension


Chronic liver disease


Chronic thrombocytopenia








Hospital course:


This is a pleasant 79 years old female with multiple medical problems presented 

with recurrent ascites and related to her liver disease and cirrhosis, she 

underwent large volume paracentesis were 3 L of fluid were removed on 3/10 follo

wed by drop in blood pressure, patient is been followed closely by 

pulmonary/critical care team and nephrology team and her blood pressure is more 

stable currently and this morning was 100//61, with heart rate 80s 

cardiology were following the patient and cardiac cath could not be done because

of worsening renal function, eventually cardiologist recommended to continue 

with medical treatment and cleared her for discharge to follow up as an 

outpatient for further management and reevaluation.  patient has been followed 

also by nephrologist her creatinine is still slightly elevated at 1.33, compared

to 1.43 upon admission, patient is followed by Dr. Chandler the nephrologist who 

recommended Lasix 40 mg by mouth daily with replacing electrolytes.


Patient pressure was uncontrolled and she was placed on insulin however patient 

preferred to suture 2 pills as she was not using insulin before and it might be 

difficult for her, so MRI was admitted, and her blood pressure is better 

controlled this morning or 204, 179


Patient states she has glucometer at home and she was instructed to check her 

sugar 4 times a day before each meal and at bedtime, keep the results in a logic

book and bring continue your doctor's appointment, call 911 on come to emergency

room if her sugar is less than 70 or more than 400 and she agrees


On the day of discharge patient denies chest pain or dyspnea, she regular bowel 

movement no urinary complaints


Patient has been cleared by all consultants including cardiology, pulmonary, 

nephrology and GI team


Problems and management plan were discussed with the patient and he verbalized 

understanding and acceptance


Patient was found stable and can be discharged home however he needs follow-up 

as an outpatient. Patient was instructed to follow up with PCP Dr. Ibarra within 

one week and patient agrees.  Also patient was instructed to follow up with Dr. Arcos the nephrologist, Dr. Edd Campos the gastroenterologist and Dr. Real

the pulmonologist in 2 weeks and she agrees.  Also patient was instructed to 

follow up with cardiologist Dr. Jamir snow in 1-2 weeks and she agrees.





Gen: patient is a AAOx3, no distress


CVS: S1-S2, RRR, no murmur


Lungs: B/L CTA, no wheezing


Abdomen: soft, no distention, no tenderness, positive bowel sounds


Extremity: no leg edema or induration





Time spent more than 35 minutes


Patient Condition at Discharge: Stable





Plan - Discharge Summary


Discharge Rx Participant: No


New Discharge Prescriptions: 


No Action


   sitaGLIPtin PHOSPHATE [Januvia] 100 mg PO DAILY


   Sertraline [Zoloft] 50 mg PO DAILY


   Pioglitazone [Actos] 30 mg PO DAILY


   Cholecalciferol (Vitamin D3) [Vitamin D3] 2,000 unit PO DAILY


   Cyanocobalamin [Vitamin B-12] 1,000 mcg PO DAILY #60 tab


   Furosemide [Lasix] 40 mg PO BID #30 tablet


   Lisinopril [Zestril] 5 mg PO DAILY #0


   Spironolactone 50 mg PO DAILY


   Niacin 2,000 mg PO DAILY


Discharge Medication List





Cholecalciferol (Vitamin D3) [Vitamin D3] 2,000 unit PO DAILY 11/09/19 [History]


Pioglitazone [Actos] 30 mg PO DAILY 11/09/19 [History]


Sertraline [Zoloft] 50 mg PO DAILY 11/09/19 [History]


sitaGLIPtin PHOSPHATE [Januvia] 100 mg PO DAILY 11/09/19 [History]


Cyanocobalamin [Vitamin B-12] 1,000 mcg PO DAILY #60 tab 11/12/19 [Rx]


Furosemide [Lasix] 40 mg PO BID #30 tablet 02/20/20 [Rx]


Lisinopril [Zestril] 5 mg PO DAILY #0 02/20/20 [Rx]


Niacin 2,000 mg PO DAILY 03/10/20 [History]


Spironolactone 50 mg PO DAILY 03/10/20 [History]








Follow up Appointment(s)/Referral(s): 


Anneliese Arcos MD [STAFF PHYSICIAN] - 2 Weeks


Susan King MD [Primary Care Provider] - 1-2 days


Thania Puga MD [STAFF PHYSICIAN] - 2 Weeks


Juan Carlos Puga MD [STAFF PHYSICIAN] - 1 Week


Claribel Real MD [STAFF PHYSICIAN] - 2 Weeks


Patient Instructions/Handouts:  Non-diabetic Hypoglycemia (DC), Non-diabetic 

Hypoglycemia (GEN), How to Check your Blood Sugar (DC), How to Check your Blood 

Sugar (GEN)


Activity/Diet/Wound Care/Special Instructions: 


Eliquis script in [facility].





**Heart healthy diabetic diet, 16 kcal per day


**Activity is limited till you see your doctor





**We recommend to check your sugar 4 times a day, before each meal and at 

bedtime and keep the results in a log  book and bring it to your doctor at your 

appointment ... call 911 on come to emergency room if her sugar is less than 70 

or more than 400.


Discharge Disposition: HOME SELF-CARE

## 2020-03-31 ENCOUNTER — HOSPITAL ENCOUNTER (INPATIENT)
Dept: HOSPITAL 47 - EC | Age: 80
LOS: 2 days | Discharge: NURSING, REHAB OR LONG-TERM CARE | DRG: 557 | End: 2020-04-02
Attending: INTERNAL MEDICINE | Admitting: INTERNAL MEDICINE
Payer: MEDICARE

## 2020-03-31 VITALS — BODY MASS INDEX: 33.5 KG/M2

## 2020-03-31 DIAGNOSIS — M62.58: Primary | ICD-10-CM

## 2020-03-31 DIAGNOSIS — K76.6: ICD-10-CM

## 2020-03-31 DIAGNOSIS — I50.33: ICD-10-CM

## 2020-03-31 DIAGNOSIS — Z79.01: ICD-10-CM

## 2020-03-31 DIAGNOSIS — Z98.890: ICD-10-CM
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
## 2020-03-31 DIAGNOSIS — Z82.49: ICD-10-CM

## 2020-03-31 DIAGNOSIS — I27.20: ICD-10-CM

## 2020-03-31 DIAGNOSIS — I48.0: ICD-10-CM

## 2020-03-31 DIAGNOSIS — R53.1: ICD-10-CM

## 2020-03-31 DIAGNOSIS — Z82.0: ICD-10-CM

## 2020-03-31 DIAGNOSIS — L89.152: ICD-10-CM

## 2020-03-31 DIAGNOSIS — R62.7: ICD-10-CM

## 2020-03-31 DIAGNOSIS — Z79.82: ICD-10-CM

## 2020-03-31 DIAGNOSIS — D69.59: ICD-10-CM

## 2020-03-31 DIAGNOSIS — E66.9: ICD-10-CM

## 2020-03-31 DIAGNOSIS — E11.41: ICD-10-CM

## 2020-03-31 DIAGNOSIS — Z85.3: ICD-10-CM

## 2020-03-31 DIAGNOSIS — S00.83XA: ICD-10-CM

## 2020-03-31 DIAGNOSIS — Z80.0: ICD-10-CM

## 2020-03-31 DIAGNOSIS — Z90.49: ICD-10-CM

## 2020-03-31 DIAGNOSIS — F32.9: ICD-10-CM

## 2020-03-31 DIAGNOSIS — R29.6: ICD-10-CM

## 2020-03-31 DIAGNOSIS — I11.0: ICD-10-CM

## 2020-03-31 DIAGNOSIS — Z79.899: ICD-10-CM

## 2020-03-31 DIAGNOSIS — I25.10: ICD-10-CM

## 2020-03-31 DIAGNOSIS — K74.60: ICD-10-CM

## 2020-03-31 DIAGNOSIS — Z79.84: ICD-10-CM

## 2020-03-31 LAB
ALBUMIN SERPL-MCNC: 2.6 G/DL (ref 3.5–5)
ALP SERPL-CCNC: 73 U/L (ref 38–126)
ALT SERPL-CCNC: 21 U/L (ref 4–34)
ANION GAP SERPL CALC-SCNC: 7 MMOL/L
APTT BLD: 25.9 SEC (ref 22–30)
AST SERPL-CCNC: 37 U/L (ref 14–36)
BASOPHILS # BLD AUTO: 0 K/UL (ref 0–0.2)
BASOPHILS NFR BLD AUTO: 0 %
BUN SERPL-SCNC: 32 MG/DL (ref 7–17)
CALCIUM SPEC-MCNC: 8.5 MG/DL (ref 8.4–10.2)
CHLORIDE SERPL-SCNC: 107 MMOL/L (ref 98–107)
CK SERPL-CCNC: 37 U/L (ref 30–135)
CO2 SERPL-SCNC: 24 MMOL/L (ref 22–30)
EOSINOPHIL # BLD AUTO: 0.1 K/UL (ref 0–0.7)
EOSINOPHIL NFR BLD AUTO: 2 %
ERYTHROCYTE [DISTWIDTH] IN BLOOD BY AUTOMATED COUNT: 4.03 M/UL (ref 3.8–5.4)
ERYTHROCYTE [DISTWIDTH] IN BLOOD: 18.1 % (ref 11.5–15.5)
GLUCOSE BLD-MCNC: 239 MG/DL (ref 75–99)
GLUCOSE SERPL-MCNC: 208 MG/DL (ref 74–99)
GLUCOSE UR QL: (no result)
HCT VFR BLD AUTO: 32.4 % (ref 34–46)
HGB BLD-MCNC: 10.2 GM/DL (ref 11.4–16)
HYALINE CASTS UR QL AUTO: 14 /LPF (ref 0–2)
INR PPP: 1.3 (ref ?–1.2)
LYMPHOCYTES # SPEC AUTO: 0.7 K/UL (ref 1–4.8)
LYMPHOCYTES NFR SPEC AUTO: 11 %
MAGNESIUM SPEC-SCNC: 1.5 MG/DL (ref 1.6–2.3)
MCH RBC QN AUTO: 25.3 PG (ref 25–35)
MCHC RBC AUTO-ENTMCNC: 31.5 G/DL (ref 31–37)
MCV RBC AUTO: 80.5 FL (ref 80–100)
MONOCYTES # BLD AUTO: 0.3 K/UL (ref 0–1)
MONOCYTES NFR BLD AUTO: 5 %
NEUTROPHILS # BLD AUTO: 5.5 K/UL (ref 1.3–7.7)
NEUTROPHILS NFR BLD AUTO: 80 %
PH UR: 5.5 [PH] (ref 5–8)
PLATELET # BLD AUTO: 50 K/UL (ref 150–450)
POTASSIUM SERPL-SCNC: 3.9 MMOL/L (ref 3.5–5.1)
PROT SERPL-MCNC: 5.2 G/DL (ref 6.3–8.2)
PT BLD: 12.6 SEC (ref 9–12)
RBC UR QL: 9 /HPF (ref 0–5)
SODIUM SERPL-SCNC: 138 MMOL/L (ref 137–145)
SP GR UR: 1.02 (ref 1–1.03)
SQUAMOUS UR QL AUTO: 20 /HPF (ref 0–4)
UROBILINOGEN UR QL STRIP: <2 MG/DL (ref ?–2)
WBC # BLD AUTO: 6.9 K/UL (ref 3.8–10.6)
WBC # UR AUTO: 34 /HPF (ref 0–5)

## 2020-03-31 PROCEDURE — 96360 HYDRATION IV INFUSION INIT: CPT

## 2020-03-31 PROCEDURE — 94760 N-INVAS EAR/PLS OXIMETRY 1: CPT

## 2020-03-31 PROCEDURE — 81001 URINALYSIS AUTO W/SCOPE: CPT

## 2020-03-31 PROCEDURE — 36415 COLL VENOUS BLD VENIPUNCTURE: CPT

## 2020-03-31 PROCEDURE — 80048 BASIC METABOLIC PNL TOTAL CA: CPT

## 2020-03-31 PROCEDURE — 87186 SC STD MICRODIL/AGAR DIL: CPT

## 2020-03-31 PROCEDURE — 85730 THROMBOPLASTIN TIME PARTIAL: CPT

## 2020-03-31 PROCEDURE — 87086 URINE CULTURE/COLONY COUNT: CPT

## 2020-03-31 PROCEDURE — 80053 COMPREHEN METABOLIC PANEL: CPT

## 2020-03-31 PROCEDURE — 96361 HYDRATE IV INFUSION ADD-ON: CPT

## 2020-03-31 PROCEDURE — 82550 ASSAY OF CK (CPK): CPT

## 2020-03-31 PROCEDURE — 87077 CULTURE AEROBIC IDENTIFY: CPT

## 2020-03-31 PROCEDURE — 83735 ASSAY OF MAGNESIUM: CPT

## 2020-03-31 PROCEDURE — 84484 ASSAY OF TROPONIN QUANT: CPT

## 2020-03-31 PROCEDURE — 81003 URINALYSIS AUTO W/O SCOPE: CPT

## 2020-03-31 PROCEDURE — 76604 US EXAM CHEST: CPT

## 2020-03-31 PROCEDURE — 83605 ASSAY OF LACTIC ACID: CPT

## 2020-03-31 PROCEDURE — 99285 EMERGENCY DEPT VISIT HI MDM: CPT

## 2020-03-31 PROCEDURE — 93005 ELECTROCARDIOGRAM TRACING: CPT

## 2020-03-31 PROCEDURE — 85610 PROTHROMBIN TIME: CPT

## 2020-03-31 PROCEDURE — 70450 CT HEAD/BRAIN W/O DYE: CPT

## 2020-03-31 PROCEDURE — 71046 X-RAY EXAM CHEST 2 VIEWS: CPT

## 2020-03-31 PROCEDURE — 85025 COMPLETE CBC W/AUTO DIFF WBC: CPT

## 2020-03-31 PROCEDURE — 83880 ASSAY OF NATRIURETIC PEPTIDE: CPT

## 2020-03-31 RX ADMIN — MAGNESIUM SULFATE IN DEXTROSE SCH MLS/HR: 10 INJECTION, SOLUTION INTRAVENOUS at 17:17

## 2020-03-31 RX ADMIN — GABAPENTIN SCH MG: 100 CAPSULE ORAL at 15:55

## 2020-03-31 RX ADMIN — FUROSEMIDE SCH MG: 10 INJECTION, SOLUTION INTRAMUSCULAR; INTRAVENOUS at 13:48

## 2020-03-31 RX ADMIN — GABAPENTIN SCH: 100 CAPSULE ORAL at 21:13

## 2020-03-31 RX ADMIN — CEFAZOLIN SCH MLS/HR: 330 INJECTION, POWDER, FOR SOLUTION INTRAMUSCULAR; INTRAVENOUS at 10:31

## 2020-03-31 RX ADMIN — FUROSEMIDE SCH MG: 10 INJECTION, SOLUTION INTRAMUSCULAR; INTRAVENOUS at 20:42

## 2020-03-31 RX ADMIN — MAGNESIUM SULFATE IN DEXTROSE SCH MLS/HR: 10 INJECTION, SOLUTION INTRAVENOUS at 15:55

## 2020-03-31 RX ADMIN — METOPROLOL TARTRATE SCH MG: 25 TABLET, FILM COATED ORAL at 20:41

## 2020-03-31 RX ADMIN — GLIMEPIRIDE SCH MG: 2 TABLET ORAL at 17:17

## 2020-03-31 RX ADMIN — FLUDROCORTISONE ACETATE SCH MG: 0.1 TABLET ORAL at 20:41

## 2020-03-31 NOTE — CT
EXAMINATION TYPE: CT brain wo con

 

DATE OF EXAM: 3/31/2020

 

COMPARISON:

 

INDICATION: Head trauma, minor, normal mental status

 

DLP: 1054.4 mGycm, Automated exposure control for dose reduction was used.

 

CONTRAST: None

 

CT of the brain is performed utilizing 3 mm thick sections through the posterior fossa and 3 mm thick
 sections through the remaining calvarium.  Study is performed within 24 hours of arrival to the hosp
ital. 

 

No abnormal hyperdensity is present to suggest an acute intracranial hemorrhage.

No mass lesion is evident. Physiologic basal ganglion calcification is present.

No acute infarcts are evident. There is mild periventricular white matter hypodensity, likely on the 
basis of chronic white matter ischemic change.

Ventricles and sulci are appropriate for the patient age.  

Vascular calcifications in the distal internal carotid arteries.

Paranasal sinuses and mastoid air cells within the field-of-view are clear.

 

IMPRESSIONS:

1.   Minimal chronic appearing periventricular white matter ischemic changes.

2. No acute intracranial process.

## 2020-03-31 NOTE — US
EXAMINATION TYPE: US chest

 

DATE OF EXAM: 3/31/2020

 

COMPARISON: NONE

 

CLINICAL HISTORY: possible pleural effusion.

 

TECHNIQUE:  Targeted ultrasound of the posterior lower chest

 

EXAM MEASUREMENTS:

 

Right Pleural Effusion pocket size: not visualized

Left Pleural Effusion pocket size:  2.6 cm

**  Left skin surface to fluid distance:  3.7 cm

 

 

Right side not marked for possible thoracentesis outside the dept.

 

Left side marked for possible thoracentesis outside the dept.

 

Pulmonologists are able to review the images in the patient?s EMR.  

 

 

 

 

 

IMPRESSIONS: As above

## 2020-03-31 NOTE — ED
Weakness HPI





- General


Chief complaint: Weakness


Stated complaint: weakness


Time Seen by Provider: 03/31/20 07:34


Source: patient, EMS, RN notes reviewed, old records reviewed


Mode of arrival: EMS


Limitations: no limitations





- History of Present Illness


Initial comments: 





This is a 79-year-old female with a history of recent admission to the hospital 

for about 10 days who was brought in by EMS today because of frequent falling 

she fell apparently this morning fell yesterday she even fell today she got out 

of the hospital in her face she evaluated since then.  She normally is on her 

left to get into bed and out of bed.  She does not use walking such as walkers 

or cane..  She does state that she's been eating and drinking enough fluids has 

no focal weakness per paramedics she did demonstrate bruising to her face no 

overt neck pain.  She was unable to get herself up today.


MD Complaint: generalized weakness





- Related Data


                                Home Medications











 Medication  Instructions  Recorded  Confirmed


 


Cholecalciferol (Vitamin D3) 2,000 unit PO DAILY 11/09/19 03/10/20





[Vitamin D3]   


 


Pioglitazone [Actos] 30 mg PO DAILY 11/09/19 03/10/20


 


Sertraline [Zoloft] 50 mg PO DAILY 11/09/19 03/10/20


 


sitaGLIPtin PHOSPHATE [Januvia] 100 mg PO DAILY 11/09/19 03/10/20


 


Niacin 2,000 mg PO DAILY 03/10/20 03/10/20


 


Spironolactone 50 mg PO DAILY 03/10/20 03/10/20








                                  Previous Rx's











 Medication  Instructions  Recorded


 


Cyanocobalamin [Vitamin B-12] 1,000 mcg PO DAILY #60 tab 11/12/19


 


Acetaminophen Tab [Tylenol] 500 mg PO Q6HR PRN  tab 03/20/20


 


Apixaban [Eliquis] 5 mg PO BID #60 tab 03/20/20


 


Aspirin 81 mg PO DAILY #30 chew 03/20/20


 


Fludrocortisone [Florinef] 0.2 mg PO BID #120 tab 03/20/20


 


Furosemide [Lasix] 40 mg PO DAILY #30 tab 03/20/20


 


Gabapentin [Neurontin] 100 mg PO TID #90 cap 03/20/20


 


Glimepiride [Amaryl] 2 mg PO BID #60 tab 03/20/20


 


Lactulose [Cephulac] 10 gm PO DAILY #450 ml 03/20/20


 


Magnesium Oxide [Mag-Ox] 400 mg PO DAILY #10 tab 03/20/20


 


Metoprolol Tartrate [Lopressor] 12.5 mg PO BID #60 tab 03/20/20


 


Nitroglycerin Sl Tabs [Nitrostat] 0.4 mg SUBLINGUAL Q5M PRN #30 tab 03/20/20


 


Pantoprazole [Protonix] 40 mg PO AC-BRKFST #30 tablet. 03/20/20


 


Spironolactone [Aldactone] 50 mg PO BID #120 tab 03/20/20











                                    Allergies











Allergy/AdvReac Type Severity Reaction Status Date / Time


 


No Known Allergies Allergy   Verified 03/10/20 10:16














Review of Systems


ROS Statement: 


Those systems with pertinent positive or pertinent negative responses have been 

documented in the HPI.





ROS Other: All systems not noted in ROS Statement are negative.





Past Medical History


Past Medical History: Cancer, Diabetes Mellitus, Hypertension, Liver Disease


Additional Past Medical History / Comment(s): Pt recently admitted to [facility] on 

2/19/20 with symptomatic ascities/had paracentesis 5 L removed, thrombocytopenia

 2ndary to cirrhosis/splenic sequestration and portal HTN.        Other:  

Nonalcoholic cirrhosis-thought d/t rx, ascities/paracentesis, L breast cancer 

with lumpectomy, NIDDM type II, neuropathy bilateral legs/feet,


History of Any Multi-Drug Resistant Organisms: None Reported


Past Surgical History: Breast Surgery, Cholecystectomy


Additional Past Surgical History / Comment(s): L breast lumpectomy for cancer, 

right breast lump removal-benign, paracentesis-several,


Past Anesthesia/Blood Transfusion Reactions: No Reported Reaction


Past Psychological History: Depression


Smoking Status: Never smoker


Past Alcohol Use History: None Reported


Past Drug Use History: None Reported





- Past Family History


  ** Father


Family Medical History: Cancer


Additional Family Medical History / Comment(s): Colon cancer





  ** Brother(s)


Family Medical History: Myocardial Infarction (MI)


Additional Family Medical History / Comment(s): One brother with stents, another

 brother passed away from an MI





  ** Mother


Family Medical History: Neurologic Disorder


Additional Family Medical History / Comment(s): Parkinsons





General Exam





- General Exam Comments


Initial Comments: 





This a well-developed obese female who is awake alert oriented 3 male Amarilis 

Coma Scale of 15


Limitations: no limitations


General appearance: alert


Head exam: Present: normocephalic (Orbital ecchymosis bilaterally no definite 

step-off or crepitation)


Eye exam: Present: normal appearance, PERRL, EOMI.  Absent: scleral icterus, 

conjunctival injection, periorbital swelling


ENT exam: Present: mucous membranes dry


Neck exam: Present: normal inspection, other (No stridor JVD or bruits)


Respiratory exam: Present: chest wall tenderness, decreased breath sounds


Cardiovascular Exam: Present: regular rate, normal rhythm, other (Occasional 

PVCs)


GI/Abdominal exam: Present: distended


Rectal exam: Present: deferred


Extremities exam: Present: pedal edema (Edema to the knees with healing scabs 

over both knees.)


Skin exam: Present: warm, dry, normal color





Course


                                   Vital Signs











  03/31/20





  07:34


 


Temperature 97.8 F


 


Pulse Rate 85


 


Respiratory 18





Rate 


 


Blood Pressure 138/65


 


O2 Sat by Pulse 97





Oximetry 














- Reevaluation(s)


Reevaluation #1: 





03/31/20 09:50


Reevaluation the patient reveals no change in her mental status.





EKG Findings





- EKG Results:


EKG: interpreted by HOLLIE (Sinus rhythm with occasional PVCs rate 85.  Interval 

164 QRS 72 QT since /442 low-voltage QRS nonspecific anterior 

configuration)





Medical Decision Making





- Medical Decision Making





Patient is feeling no change I did discuss findings with her as well as with Dr. Rodarte.  Patient demonstrates evidence of CHF no she does appear to be 

intravascularly dehydrated additionally weakness with frequent falls.  She will 

require rehab.  She is admitted





- Lab Data


Result diagrams: 


                                 03/31/20 07:40





                                 03/31/20 07:40


                                   Lab Results











  03/31/20 03/31/20 03/31/20 Range/Units





  07:40 07:40 07:40 


 


WBC  6.9    (3.8-10.6)  k/uL


 


RBC  4.03    (3.80-5.40)  m/uL


 


Hgb  10.2 L    (11.4-16.0)  gm/dL


 


Hct  32.4 L    (34.0-46.0)  %


 


MCV  80.5    (80.0-100.0)  fL


 


MCH  25.3    (25.0-35.0)  pg


 


MCHC  31.5    (31.0-37.0)  g/dL


 


RDW  18.1 H    (11.5-15.5)  %


 


Plt Count  50 L    (150-450)  k/uL


 


Neutrophils %  80    %


 


Lymphocytes %  11    %


 


Monocytes %  5    %


 


Eosinophils %  2    %


 


Basophils %  0    %


 


Neutrophils #  5.5    (1.3-7.7)  k/uL


 


Lymphocytes #  0.7 L    (1.0-4.8)  k/uL


 


Monocytes #  0.3    (0-1.0)  k/uL


 


Eosinophils #  0.1    (0-0.7)  k/uL


 


Basophils #  0.0    (0-0.2)  k/uL


 


Manual Slide Review  Performed    


 


Hypochromasia  Slight    


 


Anisocytosis  Slight    


 


Microcytosis  Slight    


 


PT   12.6 H   (9.0-12.0)  sec


 


INR   1.3 H   (<1.2)  


 


APTT   25.9   (22.0-30.0)  sec


 


Sodium    138  (137-145)  mmol/L


 


Potassium    3.9  (3.5-5.1)  mmol/L


 


Chloride    107  ()  mmol/L


 


Carbon Dioxide    24  (22-30)  mmol/L


 


Anion Gap    7  mmol/L


 


BUN    32 H  (7-17)  mg/dL


 


Creatinine    1.05 H  (0.52-1.04)  mg/dL


 


Est GFR (CKD-EPI)AfAm    59  (>60 ml/min/1.73 sqM)  


 


Est GFR (CKD-EPI)NonAf    51  (>60 ml/min/1.73 sqM)  


 


Glucose    208 H  (74-99)  mg/dL


 


Plasma Lactic Acid Joaquín     (0.7-2.0)  mmol/L


 


Calcium    8.5  (8.4-10.2)  mg/dL


 


Magnesium    1.5 L  (1.6-2.3)  mg/dL


 


Total Bilirubin    1.2  (0.2-1.3)  mg/dL


 


AST    37 H  (14-36)  U/L


 


ALT    21  (4-34)  U/L


 


Alkaline Phosphatase    73  ()  U/L


 


Creatine Kinase    37  ()  U/L


 


Troponin I     (0.000-0.034)  ng/mL


 


NT-Pro-B Natriuret Pep     pg/mL


 


Total Protein    5.2 L  (6.3-8.2)  g/dL


 


Albumin    2.6 L  (3.5-5.0)  g/dL


 


Urine Color     


 


Urine Appearance     (Clear)  


 


Urine pH     (5.0-8.0)  


 


Ur Specific Gravity     (1.001-1.035)  


 


Urine Protein     (Negative)  


 


Urine Glucose (UA)     (Negative)  


 


Urine Ketones     (Negative)  


 


Urine Blood     (Negative)  


 


Urine Nitrite     (Negative)  


 


Urine Bilirubin     (Negative)  


 


Urine Urobilinogen     (<2.0)  mg/dL


 


Ur Leukocyte Esterase     (Negative)  


 


Urine RBC     (0-5)  /hpf


 


Urine WBC     (0-5)  /hpf


 


Ur Squamous Epith Cells     (0-4)  /hpf


 


Amorphous Sediment     (None)  /hpf


 


Urine Bacteria     (None)  /hpf


 


Hyaline Casts     (0-2)  /lpf


 


Urine Mucus     (None)  /hpf














  03/31/20 03/31/20 03/31/20 Range/Units





  07:40 07:40 07:40 


 


WBC     (3.8-10.6)  k/uL


 


RBC     (3.80-5.40)  m/uL


 


Hgb     (11.4-16.0)  gm/dL


 


Hct     (34.0-46.0)  %


 


MCV     (80.0-100.0)  fL


 


MCH     (25.0-35.0)  pg


 


MCHC     (31.0-37.0)  g/dL


 


RDW     (11.5-15.5)  %


 


Plt Count     (150-450)  k/uL


 


Neutrophils %     %


 


Lymphocytes %     %


 


Monocytes %     %


 


Eosinophils %     %


 


Basophils %     %


 


Neutrophils #     (1.3-7.7)  k/uL


 


Lymphocytes #     (1.0-4.8)  k/uL


 


Monocytes #     (0-1.0)  k/uL


 


Eosinophils #     (0-0.7)  k/uL


 


Basophils #     (0-0.2)  k/uL


 


Manual Slide Review     


 


Hypochromasia     


 


Anisocytosis     


 


Microcytosis     


 


PT     (9.0-12.0)  sec


 


INR     (<1.2)  


 


APTT     (22.0-30.0)  sec


 


Sodium     (137-145)  mmol/L


 


Potassium     (3.5-5.1)  mmol/L


 


Chloride     ()  mmol/L


 


Carbon Dioxide     (22-30)  mmol/L


 


Anion Gap     mmol/L


 


BUN     (7-17)  mg/dL


 


Creatinine     (0.52-1.04)  mg/dL


 


Est GFR (CKD-EPI)AfAm     (>60 ml/min/1.73 sqM)  


 


Est GFR (CKD-EPI)NonAf     (>60 ml/min/1.73 sqM)  


 


Glucose     (74-99)  mg/dL


 


Plasma Lactic Acid Joaquín  1.4    (0.7-2.0)  mmol/L


 


Calcium     (8.4-10.2)  mg/dL


 


Magnesium     (1.6-2.3)  mg/dL


 


Total Bilirubin     (0.2-1.3)  mg/dL


 


AST     (14-36)  U/L


 


ALT     (4-34)  U/L


 


Alkaline Phosphatase     ()  U/L


 


Creatine Kinase     ()  U/L


 


Troponin I   <0.012   (0.000-0.034)  ng/mL


 


NT-Pro-B Natriuret Pep    3850  pg/mL


 


Total Protein     (6.3-8.2)  g/dL


 


Albumin     (3.5-5.0)  g/dL


 


Urine Color     


 


Urine Appearance     (Clear)  


 


Urine pH     (5.0-8.0)  


 


Ur Specific Gravity     (1.001-1.035)  


 


Urine Protein     (Negative)  


 


Urine Glucose (UA)     (Negative)  


 


Urine Ketones     (Negative)  


 


Urine Blood     (Negative)  


 


Urine Nitrite     (Negative)  


 


Urine Bilirubin     (Negative)  


 


Urine Urobilinogen     (<2.0)  mg/dL


 


Ur Leukocyte Esterase     (Negative)  


 


Urine RBC     (0-5)  /hpf


 


Urine WBC     (0-5)  /hpf


 


Ur Squamous Epith Cells     (0-4)  /hpf


 


Amorphous Sediment     (None)  /hpf


 


Urine Bacteria     (None)  /hpf


 


Hyaline Casts     (0-2)  /lpf


 


Urine Mucus     (None)  /hpf














  03/31/20 Range/Units





  08:20 


 


WBC   (3.8-10.6)  k/uL


 


RBC   (3.80-5.40)  m/uL


 


Hgb   (11.4-16.0)  gm/dL


 


Hct   (34.0-46.0)  %


 


MCV   (80.0-100.0)  fL


 


MCH   (25.0-35.0)  pg


 


MCHC   (31.0-37.0)  g/dL


 


RDW   (11.5-15.5)  %


 


Plt Count   (150-450)  k/uL


 


Neutrophils %   %


 


Lymphocytes %   %


 


Monocytes %   %


 


Eosinophils %   %


 


Basophils %   %


 


Neutrophils #   (1.3-7.7)  k/uL


 


Lymphocytes #   (1.0-4.8)  k/uL


 


Monocytes #   (0-1.0)  k/uL


 


Eosinophils #   (0-0.7)  k/uL


 


Basophils #   (0-0.2)  k/uL


 


Manual Slide Review   


 


Hypochromasia   


 


Anisocytosis   


 


Microcytosis   


 


PT   (9.0-12.0)  sec


 


INR   (<1.2)  


 


APTT   (22.0-30.0)  sec


 


Sodium   (137-145)  mmol/L


 


Potassium   (3.5-5.1)  mmol/L


 


Chloride   ()  mmol/L


 


Carbon Dioxide   (22-30)  mmol/L


 


Anion Gap   mmol/L


 


BUN   (7-17)  mg/dL


 


Creatinine   (0.52-1.04)  mg/dL


 


Est GFR (CKD-EPI)AfAm   (>60 ml/min/1.73 sqM)  


 


Est GFR (CKD-EPI)NonAf   (>60 ml/min/1.73 sqM)  


 


Glucose   (74-99)  mg/dL


 


Plasma Lactic Acid Joaquín   (0.7-2.0)  mmol/L


 


Calcium   (8.4-10.2)  mg/dL


 


Magnesium   (1.6-2.3)  mg/dL


 


Total Bilirubin   (0.2-1.3)  mg/dL


 


AST   (14-36)  U/L


 


ALT   (4-34)  U/L


 


Alkaline Phosphatase   ()  U/L


 


Creatine Kinase   ()  U/L


 


Troponin I   (0.000-0.034)  ng/mL


 


NT-Pro-B Natriuret Pep   pg/mL


 


Total Protein   (6.3-8.2)  g/dL


 


Albumin   (3.5-5.0)  g/dL


 


Urine Color  Yellow  


 


Urine Appearance  Cloudy H  (Clear)  


 


Urine pH  5.5  (5.0-8.0)  


 


Ur Specific Gravity  1.017  (1.001-1.035)  


 


Urine Protein  Trace H  (Negative)  


 


Urine Glucose (UA)  1+ H  (Negative)  


 


Urine Ketones  Negative  (Negative)  


 


Urine Blood  Moderate H  (Negative)  


 


Urine Nitrite  Negative  (Negative)  


 


Urine Bilirubin  Negative  (Negative)  


 


Urine Urobilinogen  <2.0  (<2.0)  mg/dL


 


Ur Leukocyte Esterase  Large H  (Negative)  


 


Urine RBC  9 H  (0-5)  /hpf


 


Urine WBC  34 H  (0-5)  /hpf


 


Ur Squamous Epith Cells  20 H  (0-4)  /hpf


 


Amorphous Sediment  Rare H  (None)  /hpf


 


Urine Bacteria  Occasional H  (None)  /hpf


 


Hyaline Casts  14 H  (0-2)  /lpf


 


Urine Mucus  Rare H  (None)  /hpf














- Radiology Data


Radiology results: report reviewed (I did review the imaging and report evidence

 a left lower lobe infiltrate versus effusion), image reviewed





Disposition


Clinical Impression: 


 Congestive heart failure (CHF), Frequent falls, Facial contusion, Failure to 

thrive





Disposition: ADMITTED AS IP TO THIS [facility]


Condition: Fair


Referrals: 


Susan King MD [Primary Care Provider] - 1-2 days

## 2020-03-31 NOTE — XR
EXAMINATION TYPE: XR chest 2V

 

DATE OF EXAM: 3/31/2020

 

COMPARISON: 3/18/2020

 

INDICATION: Weakness

 

TECHNIQUE:  Frontal and lateral views of the chest are obtained.

 

FINDINGS:  

The heart size is enlarged.  

The pulmonary vasculature is upper limits normal slightly more prominent than comparison.

Left lower lobe infiltrate is present. A small left pleural effusion may be present..

 

IMPRESSION:  

1. Left lower lobe infiltrate and/or pleural effusion

## 2020-03-31 NOTE — P.CRDCN
History of Present Illness


History of present illness: 





HISTORY OF PRESENTING ILLNESS


This is a pleasant 79-year-old  female past medical history significant

for paroxysmal atrial fibrillation on long-term anticoagulation, diabetes jeevani

tus, hypertension, non-alcoholic cirrhosis and underlying coronary artery 

disease with an abnormal stress test in March 2020 was performed secondary to 

abnormal troponins during that admission.  She was scheduled to undergo a 

cardiac catheterization however she became medically unstable and this was 

postponed. She was supposed to have her follow-up appointment with Dr. Puga 

today.  However she was brought to the hospital by family secondary to increased

weakness and frequent falls. She states she has fallen lately due to feeling too

weak, no syncope, dizziness, chest pain or palpitations. She has not had loss of

consciousness. She does describe increased shortness of breath and orthopnea si

nce being discharged 3/20/2020. She is also feeling some right upper quadrant 

abdominal discomfort with movement or on palpation. Most recent echocardiogram 

obtained in March 2020 revealed preserved LV systolic function with ejection 

fraction 55-60%, normal diastolic filling pattern, mild aortic stenosis with a 

mean gradient of 11 mmHg, mild MR, mild TR and mild pulmonary hypertension with 

an RVSP of 43 mmHg.  Lexiscan stress test performed March 2020 revealed 

pharmacologically-induced LV myocardial ischemia, with prior infarct along the 

lateral wall associated with carmencita-infarct ischemia.  She has been initiated on 

IV diuresis per primary care team and eliquis has been held.





DIAGNOSTICS


EKG reveals sinus mechanism with poor R-wave progression and PVC noted.


Chest xray reveals left lower love infiltrate vs pleural effusion.


Ultrasound of the left chest revealed effusion size of 2.6 cm. 


Laboratory reviewed, WBC 6.9, hgb 10.2, plt 50, INR 1.3, sodium 138, potassium 

3.9, creatinine 1.05, magnesium 1.5, troponin negative x1, NTproBNP 3850.


Current cardiac medications include aldactone 50 mg BID, lopressor 12.5 mg BID, 

lasix 40 mg BID, aspirin 81 mg daily, eliquis 5 mg BID and florinef 0.2 mg BID. 





REVIEW OF SYSTEMS


At the time of my exam:


CONSTITUTIONAL: Denies fever or chills.


CARDIOVASCULAR: Complains of shortness of breath and orthopnea.  Denies chest 

pain, PND or palpitations.


RESPIRATORY: Denies cough. 


GASTROINTESTINAL: Denies abdominal pain, diarrhea, constipation, nausea or 

vomiting.


MUSCULOSKELETAL: Complains of overall generalized weakness and fatigue.  Denies 

myalgias.


NEUROLOGIC: Denies numbness, tingling or weakness.


ENDOCRINE: Denies fatigue, weight change,  polydipsia or polyurina.


GENITOURINARY: Denies burning, hematuria or urgency with micturation.


HEMATOLOGIC: Denies history of anemia or bleeding. 





PHYSICAL EXAMINATION


Blood pressure 104/64 heart rate 82 afebrile and maintaining oxygen saturation 

on room air.


CONSTITUTIONAL: No apparent distress. 


HEENT: Head is normocephalic. Pupils are equal, round. Sclerae anicteric. Mucous

membranes of the mouth are moist.  No JVD. No carotid bruit.


CHEST EXAMINATION: Lungs are clear to auscultation. No chest wall tenderness is 

noted on palpation or with deep breathing. 


HEART EXAMINATION: Regular rate and rhythm. S1, S2 heard.  Systolic ejection 

murmur at the base, no gallops or rub.


ABDOMEN: Soft, mildly tender right upper quadrant. Positive bowel sounds.


EXTREMITIES: 2+ peripheral pulses, 2+ bilateral lower extremity pitting edema up

to the mid-calf and no calf tenderness.


NEUROLOGIC EXAMINATION: Patient is awake, alert and oriented x3. 





ASSESSMENT


Frequent falls secondary to increased weakness


Pleural effusion, likely related to fluid overload from cirrhosis. Normal LV 

function and diastolic filling pattern on recent echo.


Paroxysmal atrial fibrillation, currently maintaining sinus mechanism. Eliquis 

held due to frequent falls and thrombocytopenia


Thrombocytopenia


Non-alcoholic cirrhosis


Diabetes mellitus


Hypertension





PLAN


Continue to diurese with IV lasix. 


Agree with holding anti-coagulation given the frequent falls and profound 

thrombocytopenia.


Replace magnesium per protocol. 


Follow renal function and electrolytes in the morning along with daily weights.


Thank you kindly for this consultation.





Nurse Practitioner note has been reviewed, I agree with a documented findings an

d plan of care.  Patient was seen and examined.











Past Medical History


Past Medical History: Atrial Fibrillation, Cancer, Heart Failure, Diabetes 

Mellitus, Hypertension, Liver Disease


Additional Past Medical History / Comment(s): Pt recently admitted to [facility] on 

3/11/20 with possible NSTEMI, paroxysmal Afib/RVR, hypovolemia 2ndary to large 

volume paracentesis, acute UTI, acute renal failure.     Other hx:   

Nonalcoholic cirrhosis-thought d/t rx, recurrent ascities/paracentesis, 

thrombocytopenia, portal htn, L breast cancer with lumpectomy, NIDDM type II, 

neuropathy bilateral legs/feet,


History of Any Multi-Drug Resistant Organisms: None Reported


Past Surgical History: Breast Surgery, Cholecystectomy


Additional Past Surgical History / Comment(s): L breast lumpectomy for cancer, 

right breast lump removal-benign, paracentesis-several,


Past Anesthesia/Blood Transfusion Reactions: No Reported Reaction


Smoking Status: Never smoker





- Past Family History


  ** Father


Family Medical History: Cancer


Additional Family Medical History / Comment(s): Colon cancer





  ** Brother(s)


Family Medical History: Myocardial Infarction (MI)


Additional Family Medical History / Comment(s): One brother with stents, another

brother passed away from an MI





  ** Mother


Family Medical History: Neurologic Disorder


Additional Family Medical History / Comment(s): Parkinsons





Medications and Allergies


                                Home Medications











 Medication  Instructions  Recorded  Confirmed  Type


 


Cholecalciferol (Vitamin D3) 2,000 unit PO DAILY 11/09/19 03/31/20 History





[Vitamin D3]    


 


Pioglitazone [Actos] 30 mg PO DAILY 11/09/19 03/31/20 History


 


Sertraline [Zoloft] 50 mg PO DAILY 11/09/19 03/31/20 History


 


sitaGLIPtin PHOSPHATE [Januvia] 100 mg PO DAILY 11/09/19 03/31/20 History


 


Cyanocobalamin [Vitamin B-12] 1,000 mcg PO DAILY #60 tab 11/12/19 03/31/20 Rx


 


Niacin 2,000 mg PO DAILY 03/10/20 03/31/20 History


 


Acetaminophen Tab [Tylenol] 500 mg PO Q6HR PRN  tab 03/20/20 03/31/20 Rx


 


Apixaban [Eliquis] 5 mg PO BID #60 tab 03/20/20 03/31/20 Rx


 


Aspirin 81 mg PO DAILY #30 chew 03/20/20 03/31/20 Rx


 


Fludrocortisone [Florinef] 0.2 mg PO BID #120 tab 03/20/20 03/31/20 Rx


 


Gabapentin [Neurontin] 100 mg PO TID #90 cap 03/20/20 03/31/20 Rx


 


Glimepiride [Amaryl] 2 mg PO BID #60 tab 03/20/20 03/31/20 Rx


 


Lactulose [Cephulac] 10 gm PO DAILY #450 ml 03/20/20 03/31/20 Rx


 


Magnesium Oxide [Mag-Ox] 400 mg PO DAILY #10 tab 03/20/20 03/31/20 Rx


 


Metoprolol Tartrate [Lopressor] 12.5 mg PO BID #60 tab 03/20/20 03/31/20 Rx


 


Nitroglycerin Sl Tabs [Nitrostat] 0.4 mg SUBLINGUAL Q5M PRN #30 tab 03/20/20 03/31/20 Rx


 


Pantoprazole [Protonix] 40 mg PO AC-BRKFST #30 tablet.dr 03/20/20 03/31/20 Rx


 


Spironolactone [Aldactone] 50 mg PO BID #120 tab 03/20/20 03/31/20 Rx


 


Furosemide [Lasix] 40 mg PO BID 03/31/20 03/31/20 History








                                    Allergies











Allergy/AdvReac Type Severity Reaction Status Date / Time


 


No Known Allergies Allergy   Verified 03/31/20 11:14














Physical Exam


Vitals: 


                                   Vital Signs











  Temp Pulse Pulse Resp BP BP Pulse Ox


 


 03/31/20 11:43  98.3 F   82  17   104/64  96


 


 03/31/20 10:31  98.1 F  80   18  122/55   98


 


 03/31/20 09:00   80   18  112/51   98


 


 03/31/20 07:34  97.8 F  85   18  138/65   97








                                Intake and Output











 03/30/20 03/31/20 03/31/20





 22:59 06:59 14:59


 


Other:   


 


  Weight   90.718 kg














Results





                                 03/31/20 07:40





                                 03/31/20 07:40


                                 Cardiac Enzymes











  03/31/20 03/31/20 Range/Units





  07:40 07:40 


 


AST  37 H   (14-36)  U/L


 


Troponin I   <0.012  (0.000-0.034)  ng/mL








                                   Coagulation











  03/31/20 Range/Units





  07:40 


 


PT  12.6 H  (9.0-12.0)  sec


 


APTT  25.9  (22.0-30.0)  sec








                                       CBC











  03/31/20 Range/Units





  07:40 


 


WBC  6.9  (3.8-10.6)  k/uL


 


RBC  4.03  (3.80-5.40)  m/uL


 


Hgb  10.2 L  (11.4-16.0)  gm/dL


 


Hct  32.4 L  (34.0-46.0)  %


 


Plt Count  50 L  (150-450)  k/uL








                          Comprehensive Metabolic Panel











  03/31/20 Range/Units





  07:40 


 


Sodium  138  (137-145)  mmol/L


 


Potassium  3.9  (3.5-5.1)  mmol/L


 


Chloride  107  ()  mmol/L


 


Carbon Dioxide  24  (22-30)  mmol/L


 


BUN  32 H  (7-17)  mg/dL


 


Creatinine  1.05 H  (0.52-1.04)  mg/dL


 


Glucose  208 H  (74-99)  mg/dL


 


Calcium  8.5  (8.4-10.2)  mg/dL


 


AST  37 H  (14-36)  U/L


 


ALT  21  (4-34)  U/L


 


Alkaline Phosphatase  73  ()  U/L


 


Total Protein  5.2 L  (6.3-8.2)  g/dL


 


Albumin  2.6 L  (3.5-5.0)  g/dL








                               Current Medications











Generic Name Dose Route Start Last Admin





  Trade Name Freq  PRN Reason Stop Dose Admin


 


Acetaminophen  500 mg  03/31/20 09:57 





  Tylenol Tab  PO  





  Q6HR PRN  





  Fever and/ or Pain  


 


Aspirin  81 mg  04/01/20 09:00 





  Aspirin  PO  





  DAILY [facility]  


 


Cholecalciferol  2,000 unit  04/01/20 09:00 





  Vitamin D3 (25 Mcg = 1000 Iu)  PO  





  DAILY [facility]  


 


Cyanocobalamin  1,000 mcg  04/01/20 09:00 





  Vitamin B-12  PO  





  DAILY [facility]  


 


Fludrocortisone Acetate  0.2 mg  03/31/20 21:00 





  Florinef  PO  





  BID [facility]  


 


Furosemide  40 mg  03/31/20 12:15 





  Lasix  IV  





  Q12HR [facility]  


 


Gabapentin  100 mg  03/31/20 16:00 





  Neurontin  PO  





  TID [facility]  


 


Glimepiride  2 mg  03/31/20 17:30 





  Amaryl  PO  





  AC-BID [facility]  


 


Sodium Chloride  1,000 mls @ 20 mls/hr  03/31/20 10:00  03/31/20 10:31





  Saline 0.9%  IV   20 mls/hr





  .Q24H KHUSHI   Administration


 


Lactulose  10 gm  04/01/20 09:00 





  Cephulac  PO  





  DAILY [facility]  


 


Linagliptin  5 mg  04/01/20 09:00 





  Tradjenta  PO  





  DAILY [facility]  


 


Magnesium Oxide  400 mg  04/01/20 09:00 





  Mag-Ox  PO  





  DAILY [facility]  


 


Metoprolol Tartrate  12.5 mg  03/31/20 21:00 





  Lopressor  PO  





  BID [facility]  


 


Naloxone HCl  0.2 mg  03/31/20 09:55 





  Narcan  IV  





  Q2M PRN  





  Opioid Reversal  


 


Niacin  2,000 mg  04/01/20 09:00 





  Niacin Tr  PO  





  DAILY [facility]USHI  


 


Nitroglycerin  0.4 mg  03/31/20 09:57 





  Nitrostat  SUBLINGUAL  





  Q5M PRN  





  Chest Pain  


 


Pantoprazole Sodium  40 mg  04/01/20 07:30 





  Protonix  PO  





  AC-BRKFST K[facility]USHI  


 


Pioglitazone HCl  30 mg  04/01/20 09:00 





  Actos  PO  





  DAILY [facility]  


 


Sertraline HCl  50 mg  04/01/20 09:00 





  Zoloft  PO  





  DAILY [facility]  


 


Spironolactone  50 mg  04/01/20 09:00 





  Aldactone  PO  





  DAILY [facility]US[facility]  








                                Intake and Output











 03/30/20 03/31/20 03/31/20





 22:59 06:59 14:59


 


Other:   


 


  Weight   90.718 kg








                                 Patient Weight











 04/01/20





 06:59


 


Weight 90.718 kg








                                        





                                 03/31/20 07:40 





                                 03/31/20 07:40

## 2020-03-31 NOTE — P.HPIM
History of Present Illness


Patient is a pleasant 79-year-old female was discharged about 10 days ago came 

back because of multiple falls it appears like patient family declined the 

subacute rehabilitation.  Patient denied any shunt significant tremors of breath

up but upon are regular evaluation patient appears to have right-sided pleural 

effusion and infiltrate patient denied any fever chills patient denied any cough

patient is not a great historian.  I'll obtain ultrasound of the right chest for

any possible pleural effusion.  Patient denied orthopnea paroxysmal nocturnal 

dyspnea and cannot really appreciate a JVD because of the contour patient does 

have elevated BNP visit with is a possibility of diastolic dysfunction patient 

had a normal ejection fraction the past patient will be started on IV Lasix IV 

fluids will be discontinued physical therapy occupational therapy evaluated the 

patient patient has bruises in multiple areas including ice patient has multiple

falls since since the last discharge this is secondary to generalized weakness 

from hospitalization and age related.  Patient does have history of cirrhosis 

but doesn't have any significant ascites.





Review of Systems








REVIEW OF SYSTEMS: 


CONSTITUTIONAL: No fever, no malaise, no fatigue. 


HEENT: No recent visual problems or hearing problems. Denied any sore throat. 


CARDIOVASCULAR: No chest pain, orthopnea, PND, no palpitations, no syncope. 


PULMONARY: No shortness of breath, no cough, no hemoptysis. 


GASTROINTESTINAL: No diarrhea, no nausea, no vomiting, no abdominal pain. 


NEUROLOGICAL: No headaches, no weakness, no numbness. 


HEMATOLOGICAL: Denies any bleeding or petechiae. 


GENITOURINARY: Denies any burning micturition, frequency, or urgency. 


MUSCULOSKELETAL/RHEUMATOLOGICAL: Denies any joint pain, swelling, or any muscle 

pain. 


ENDOCRINE: Denies any polyuria or polydipsia. 





The rest of the 14-point review of systems is negative.











Past Medical History


Past Medical History: Atrial Fibrillation, Cancer, Heart Failure, Diabetes 

Mellitus, Hypertension, Liver Disease


Additional Past Medical History / Comment(s): Pt recently admitted to [facility] on 

3/11/20 with possible NSTEMI, paroxysmal Afib/RVR, hypovolemia 2ndary to large 

volume paracentesis, acute UTI, acute renal failure.     Other hx:   

Nonalcoholic cirrhosis-thought d/t rx, recurrent ascities/paracentesis, thromb

ocytopenia, portal htn, L breast cancer with lumpectomy, NIDDM type II, 

neuropathy bilateral legs/feet,


History of Any Multi-Drug Resistant Organisms: None Reported


Past Surgical History: Breast Surgery, Cholecystectomy


Additional Past Surgical History / Comment(s): L breast lumpectomy for cancer, 

right breast lump removal-benign, paracentesis-several,


Past Anesthesia/Blood Transfusion Reactions: No Reported Reaction


Smoking Status: Never smoker





- Past Family History


  ** Father


Family Medical History: Cancer


Additional Family Medical History / Comment(s): Colon cancer





  ** Brother(s)


Family Medical History: Myocardial Infarction (MI)


Additional Family Medical History / Comment(s): One brother with stents, another

brother passed away from an MI





  ** Mother


Family Medical History: Neurologic Disorder


Additional Family Medical History / Comment(s): Parkinsons





Medications and Allergies


                                Home Medications











 Medication  Instructions  Recorded  Confirmed  Type


 


Cholecalciferol (Vitamin D3) 2,000 unit PO DAILY 11/09/19 03/31/20 History





[Vitamin D3]    


 


Pioglitazone [Actos] 30 mg PO DAILY 11/09/19 03/31/20 History


 


Sertraline [Zoloft] 50 mg PO DAILY 11/09/19 03/31/20 History


 


sitaGLIPtin PHOSPHATE [Januvia] 100 mg PO DAILY 11/09/19 03/31/20 History


 


Cyanocobalamin [Vitamin B-12] 1,000 mcg PO DAILY #60 tab 11/12/19 03/31/20 Rx


 


Niacin 2,000 mg PO DAILY 03/10/20 03/31/20 History


 


Acetaminophen Tab [Tylenol] 500 mg PO Q6HR PRN  tab 03/20/20 03/31/20 Rx


 


Apixaban [Eliquis] 5 mg PO BID #60 tab 03/20/20 03/31/20 Rx


 


Aspirin 81 mg PO DAILY #30 chew 03/20/20 03/31/20 Rx


 


Fludrocortisone [Florinef] 0.2 mg PO BID #120 tab 03/20/20 03/31/20 Rx


 


Gabapentin [Neurontin] 100 mg PO TID #90 cap 03/20/20 03/31/20 Rx


 


Glimepiride [Amaryl] 2 mg PO BID #60 tab 03/20/20 03/31/20 Rx


 


Lactulose [Cephulac] 10 gm PO DAILY #450 ml 03/20/20 03/31/20 Rx


 


Magnesium Oxide [Mag-Ox] 400 mg PO DAILY #10 tab 03/20/20 03/31/20 Rx


 


Metoprolol Tartrate [Lopressor] 12.5 mg PO BID #60 tab 03/20/20 03/31/20 Rx


 


Nitroglycerin Sl Tabs [Nitrostat] 0.4 mg SUBLINGUAL Q5M PRN #30 tab 03/20/20 03/31/20 Rx


 


Pantoprazole [Protonix] 40 mg PO AC-BRKFST #30 tablet.dr 03/20/20 03/31/20 Rx


 


Spironolactone [Aldactone] 50 mg PO BID #120 tab 03/20/20 03/31/20 Rx


 


Furosemide [Lasix] 40 mg PO BID 03/31/20 03/31/20 History








                                    Allergies











Allergy/AdvReac Type Severity Reaction Status Date / Time


 


No Known Allergies Allergy   Verified 03/31/20 11:14














Physical Exam


Vitals: 


                                   Vital Signs











  Temp Pulse Pulse Resp BP BP Pulse Ox


 


 03/31/20 11:43  98.3 F   82  17   104/64  96


 


 03/31/20 10:31  98.1 F  80   18  122/55   98


 


 03/31/20 09:00   80   18  112/51   98


 


 03/31/20 07:34  97.8 F  85   18  138/65   97








                                Intake and Output











 03/30/20 03/31/20 03/31/20





 22:59 06:59 14:59


 


Other:   


 


  Weight   90.718 kg

















PHYSICAL EXAMINATION: 





GENERAL: The patient is alert and oriented x3, not in any acute distress.  Obese


HEENT: Pupils are round and equally reacting to light. EOMI. No scleral icterus.

 No conjunctival pallor. Normocephalic, . No pharyngeal erythema. No 

thyromegaly. 


CARDIOVASCULAR: S1 and S2 present. No murmurs, rubs, or gallops. 


PULMONARY: Chest is clear to auscultation, no wheezing or crackles. 


ABDOMEN: Soft, nontender, nondistended, normoactive bowel sounds. No palpable 

organomegaly.  No clear evidence of any fluid shift


MUSCULOSKELETAL: No joint swelling or deformity.


EXTREMITIES: No cyanosis, clubbing, mild 1+ pitting pedal edema in both lower 

extremities


NEUROLOGICAL: Gross neurological examination did not reveal any focal deficits. 


SKIN: No bruises including bruising around the eyes

















Results


CBC & Chem 7: 


                                 03/31/20 07:40





                                 03/31/20 07:40


Labs: 


                  Abnormal Lab Results - Last 24 Hours (Table)











  03/31/20 03/31/20 03/31/20 Range/Units





  07:40 07:40 07:40 


 


Hgb  10.2 L    (11.4-16.0)  gm/dL


 


Hct  32.4 L    (34.0-46.0)  %


 


RDW  18.1 H    (11.5-15.5)  %


 


Plt Count  50 L    (150-450)  k/uL


 


Lymphocytes #  0.7 L    (1.0-4.8)  k/uL


 


PT   12.6 H   (9.0-12.0)  sec


 


INR   1.3 H   (<1.2)  


 


BUN    32 H  (7-17)  mg/dL


 


Creatinine    1.05 H  (0.52-1.04)  mg/dL


 


Glucose    208 H  (74-99)  mg/dL


 


Magnesium    1.5 L  (1.6-2.3)  mg/dL


 


AST    37 H  (14-36)  U/L


 


Total Protein    5.2 L  (6.3-8.2)  g/dL


 


Albumin    2.6 L  (3.5-5.0)  g/dL


 


Urine Appearance     (Clear)  


 


Urine Protein     (Negative)  


 


Urine Glucose (UA)     (Negative)  


 


Urine Blood     (Negative)  


 


Ur Leukocyte Esterase     (Negative)  


 


Urine RBC     (0-5)  /hpf


 


Urine WBC     (0-5)  /hpf


 


Ur Squamous Epith Cells     (0-4)  /hpf


 


Amorphous Sediment     (None)  /hpf


 


Urine Bacteria     (None)  /hpf


 


Hyaline Casts     (0-2)  /lpf


 


Urine Mucus     (None)  /hpf














  03/31/20 Range/Units





  08:20 


 


Hgb   (11.4-16.0)  gm/dL


 


Hct   (34.0-46.0)  %


 


RDW   (11.5-15.5)  %


 


Plt Count   (150-450)  k/uL


 


Lymphocytes #   (1.0-4.8)  k/uL


 


PT   (9.0-12.0)  sec


 


INR   (<1.2)  


 


BUN   (7-17)  mg/dL


 


Creatinine   (0.52-1.04)  mg/dL


 


Glucose   (74-99)  mg/dL


 


Magnesium   (1.6-2.3)  mg/dL


 


AST   (14-36)  U/L


 


Total Protein   (6.3-8.2)  g/dL


 


Albumin   (3.5-5.0)  g/dL


 


Urine Appearance  Cloudy H  (Clear)  


 


Urine Protein  Trace H  (Negative)  


 


Urine Glucose (UA)  1+ H  (Negative)  


 


Urine Blood  Moderate H  (Negative)  


 


Ur Leukocyte Esterase  Large H  (Negative)  


 


Urine RBC  9 H  (0-5)  /hpf


 


Urine WBC  34 H  (0-5)  /hpf


 


Ur Squamous Epith Cells  20 H  (0-4)  /hpf


 


Amorphous Sediment  Rare H  (None)  /hpf


 


Urine Bacteria  Occasional H  (None)  /hpf


 


Hyaline Casts  14 H  (0-2)  /lpf


 


Urine Mucus  Rare H  (None)  /hpf














Thrombosis Risk Factor Assmnt





- Choose All That Apply


Any of the Below Risk Factors Present?: Yes


Each Factor Represents 1 point: Heart failure (<1month), Obesity (BMI >25)


Other Risk Factors: Yes


Each Risk Factor Represents 2 Points: Malignancy


Each Risk Factor Represents 3 Points: Age 75 years or older


Other congenital or acquired thrombophilia - If yes, enter type in comment: No


Thrombosis Risk Factor Assessment Total Risk Factor Score: 7


Thrombosis Risk Factor Assessment Level: High Risk





Assessment and Plan


Plan: 


-Multiple falls: Secondary to recent hospitalization generalized weakness age-

related muscle atrophy: Physical occupational therapy will be consulted patient 

will need the placement with a subacute rehabilitation


-Pleural effusion etiology of pleural effusion is not care patient may have 

diastolic dysfunction with acute exacerbation we'll obtain ultrasound as unsure 

whether this is a pleural effusion or infiltrate patient doesn't have any 

clinical evidence of pneumonia patient will not be started on any antibiotics 

patient will be started on [place] Las[place] patient had a normal ejection fraction the

 past


-Proximal A. fib atrial fibrillation patient is on anti-coagulation will be 

temporally held until we know if she had a pleural effusion are not as there is 

a chance that need to be drained for diagnostic purposes.


-Cirrhosis patient doesn't have any significant clinical ascites patient may 

have some minimal ascites which is expected to improve with IV Lasix will not 

require any processes at this time


-Asymptomatic bacteriuria patient actually has a contaminated urine sample will 

not require any antibiotics as mentioned above


-Wasting


-Type 2 diabetes mellitus


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

## 2020-04-01 LAB
ANION GAP SERPL CALC-SCNC: 5 MMOL/L
BASOPHILS # BLD AUTO: 0 K/UL (ref 0–0.2)
BASOPHILS NFR BLD AUTO: 0 %
BUN SERPL-SCNC: 28 MG/DL (ref 7–17)
CALCIUM SPEC-MCNC: 7.9 MG/DL (ref 8.4–10.2)
CHLORIDE SERPL-SCNC: 107 MMOL/L (ref 98–107)
CO2 SERPL-SCNC: 25 MMOL/L (ref 22–30)
EOSINOPHIL # BLD AUTO: 0.1 K/UL (ref 0–0.7)
EOSINOPHIL NFR BLD AUTO: 1 %
ERYTHROCYTE [DISTWIDTH] IN BLOOD BY AUTOMATED COUNT: 3.83 M/UL (ref 3.8–5.4)
ERYTHROCYTE [DISTWIDTH] IN BLOOD: 17.9 % (ref 11.5–15.5)
GLUCOSE BLD-MCNC: 190 MG/DL (ref 75–99)
GLUCOSE SERPL-MCNC: 171 MG/DL (ref 74–99)
HCT VFR BLD AUTO: 32.1 % (ref 34–46)
HGB BLD-MCNC: 10 GM/DL (ref 11.4–16)
LYMPHOCYTES # SPEC AUTO: 0.6 K/UL (ref 1–4.8)
LYMPHOCYTES NFR SPEC AUTO: 8 %
MAGNESIUM SPEC-SCNC: 1.7 MG/DL (ref 1.6–2.3)
MCH RBC QN AUTO: 26.1 PG (ref 25–35)
MCHC RBC AUTO-ENTMCNC: 31.1 G/DL (ref 31–37)
MCV RBC AUTO: 83.9 FL (ref 80–100)
MONOCYTES # BLD AUTO: 0.6 K/UL (ref 0–1)
MONOCYTES NFR BLD AUTO: 7 %
NEUTROPHILS # BLD AUTO: 6.2 K/UL (ref 1.3–7.7)
NEUTROPHILS NFR BLD AUTO: 80 %
PLATELET # BLD AUTO: 33 K/UL (ref 150–450)
POTASSIUM SERPL-SCNC: 3.5 MMOL/L (ref 3.5–5.1)
SODIUM SERPL-SCNC: 137 MMOL/L (ref 137–145)
WBC # BLD AUTO: 7.7 K/UL (ref 3.8–10.6)

## 2020-04-01 RX ADMIN — METOPROLOL TARTRATE SCH MG: 25 TABLET, FILM COATED ORAL at 21:45

## 2020-04-01 RX ADMIN — GABAPENTIN SCH MG: 100 CAPSULE ORAL at 08:40

## 2020-04-01 RX ADMIN — METOPROLOL TARTRATE SCH MG: 25 TABLET, FILM COATED ORAL at 08:41

## 2020-04-01 RX ADMIN — GLIMEPIRIDE SCH MG: 2 TABLET ORAL at 08:38

## 2020-04-01 RX ADMIN — GLIMEPIRIDE SCH MG: 2 TABLET ORAL at 17:35

## 2020-04-01 RX ADMIN — CEFAZOLIN SCH: 330 INJECTION, POWDER, FOR SOLUTION INTRAMUSCULAR; INTRAVENOUS at 13:59

## 2020-04-01 RX ADMIN — FLUDROCORTISONE ACETATE SCH MG: 0.1 TABLET ORAL at 21:45

## 2020-04-01 RX ADMIN — LINAGLIPTIN SCH MG: 5 TABLET, FILM COATED ORAL at 08:41

## 2020-04-01 RX ADMIN — PANTOPRAZOLE SODIUM SCH MG: 40 TABLET, DELAYED RELEASE ORAL at 08:38

## 2020-04-01 RX ADMIN — FLUDROCORTISONE ACETATE SCH MG: 0.1 TABLET ORAL at 08:40

## 2020-04-01 RX ADMIN — Medication SCH MG: at 08:41

## 2020-04-01 RX ADMIN — SERTRALINE HYDROCHLORIDE SCH MG: 50 TABLET, FILM COATED ORAL at 08:41

## 2020-04-01 RX ADMIN — PIOGLITAZONE SCH MG: 30 TABLET ORAL at 08:41

## 2020-04-01 RX ADMIN — CYANOCOBALAMIN TAB 500 MCG SCH MCG: 500 TAB at 08:39

## 2020-04-01 RX ADMIN — LACTULOSE SCH GM: 20 SOLUTION ORAL at 08:40

## 2020-04-01 RX ADMIN — FUROSEMIDE SCH MG: 10 INJECTION, SOLUTION INTRAMUSCULAR; INTRAVENOUS at 08:40

## 2020-04-01 RX ADMIN — ASPIRIN 81 MG CHEWABLE TABLET SCH MG: 81 TABLET CHEWABLE at 08:38

## 2020-04-01 RX ADMIN — SPIRONOLACTONE SCH MG: 25 TABLET, FILM COATED ORAL at 08:41

## 2020-04-01 RX ADMIN — GABAPENTIN SCH MG: 100 CAPSULE ORAL at 17:34

## 2020-04-01 RX ADMIN — FUROSEMIDE SCH: 10 INJECTION, SOLUTION INTRAMUSCULAR; INTRAVENOUS at 22:03

## 2020-04-01 RX ADMIN — Medication SCH UNIT: at 08:41

## 2020-04-01 RX ADMIN — GABAPENTIN SCH MG: 100 CAPSULE ORAL at 21:45

## 2020-04-01 NOTE — P.PN
Subjective





HISTORY OF PRESENTING ILLNESS


This is a pleasant 79-year-old  female past medical history significant

for paroxysmal atrial fibrillation on long-term anticoagulation, diabetes 

mellitus, hypertension, non-alcoholic cirrhosis and underlying coronary artery 

disease with an abnormal stress test in March 2020 was performed secondary to 

abnormal troponins during that admission. She is seen and examined sitting up in

bed with ongoing weakness. She has mild shortness of breath. No chest pain, 

dizziness or palpitations. Blood pressure 136/72 heart rate 67 afebrile and 

maintaining oxygen saturation on room air. Laboratory data reviewed, sodium 137,

potassium 3.5, creatinine 1.





PHYSICAL EXAMINATION


CONSTITUTIONAL: No apparent distress. 


HEENT: Head is normocephalic. Pupils are equal, round. Sclerae anicteric. Mucous

membranes of the mouth are moist.  No JVD. No carotid bruit.


CHEST EXAMINATION: Lungs are clear to auscultation. No chest wall tenderness is 

noted on palpation or with deep breathing. 


HEART EXAMINATION: Regular rate and rhythm. S1, S2 heard.  Systolic ejection 

murmur at the base, no gallops or rub.


EXTREMITIES: 2+ peripheral pulses, 2+ bilateral lower extremity pitting edema up

to the mid-calf and no calf tenderness.





ASSESSMENT


Frequent falls secondary to increased weakness


Pleural effusion, likely related to fluid overload from cirrhosis. Normal LV 

function and diastolic filling pattern on recent echo.


Paroxysmal atrial fibrillation, currently maintaining sinus mechanism. Eliquis 

held due to frequent falls and thrombocytopenia


Thrombocytopenia


Non-alcoholic cirrhosis


Diabetes mellitus


Hypertension





PLAN


Discontinue eliquis. 


Stable from a cardiac perspective.





Nurse Practitioner note has been reviewed, I agree with a documented findings 

and plan of care.  Patient was seen and examined.





Objective





- Vital Signs


Vital signs: 


                                   Vital Signs











Temp  97.4 F L  04/01/20 11:25


 


Pulse  67   04/01/20 11:25


 


Resp  16   04/01/20 11:25


 


BP  136/72   04/01/20 11:25


 


Pulse Ox  99   04/01/20 11:25








                                 Intake & Output











 03/31/20 04/01/20 04/01/20





 18:59 06:59 18:59


 


Weight 85.5 kg  89.5 kg


 


Other:   


 


  Voiding Method Bedside Commode Bedside Commode Bedside Commode





 Bedpan Bedpan Bedpan





 Diaper Diaper Diaper


 


  # Voids   1


 


  # Bowel Movements   1














- Labs


CBC & Chem 7: 


                                 03/31/20 07:40





                                 04/01/20 05:56


Labs: 


                  Abnormal Lab Results - Last 24 Hours (Table)











  03/31/20 04/01/20 04/01/20 Range/Units





  19:56 05:56 06:52 


 


BUN   28 H   (7-17)  mg/dL


 


Glucose   171 H   (74-99)  mg/dL


 


POC Glucose (mg/dL)  239 H   190 H  (75-99)  mg/dL


 


Calcium   7.9 L   (8.4-10.2)  mg/dL








                      Microbiology - Last 24 Hours (Table)











 03/31/20 08:20 Urine Culture - Preliminary





 Urine,Voided    Group D Enterococcus

## 2020-04-01 NOTE — P.DS
Providers


Date of admission: 


03/31/20 09:55





Expected date of discharge: 04/01/20


Attending physician: 


Abby Rodarte





Consults: 





                                        





03/31/20 12:14


Consult Physician Routine 


   Consulting Provider: Jose Palacios


   Consult Reason/Comments: possible CHF


   Do you want consulting provider notified?: Yes











Primary care physician: 


Christine DACOSTA [facility] Course: 





Final diagnosis





-Multiple falls: Secondary to recent hospitalization generalized weakness age-

related muscle atrophy


-Pleural effusion etiology of pleural effusion is not clear patient may have 

diastolic dysfunction with acute exacerbation


-Proximal A. fib atrial fibrillation 


-Cirrhosis patient doesn't have any significant clinical ascites


-Asymptomatic bacteriuria 


-Wasting


-Type 2 diabetes mellitus


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





Discharge disposition


Patient is being discharged in a stable condition with guarded prognosis to 

[facility] for continued PT/OT therapy.  Total time taken is 35 minutes.





History of present illness


This is a 79-year-old female who was recently admitted for multiple falls and is

requiring physical therapy for strengthening mobility.  Patient was recently 

just admitted and discharged less than 2 weeks ago and refusing rehab at that 

time.  Family and patient are now agreeable to rehab as she has continued to be 

weak and family cannot take care of her at this time.  Patient does have right-

sided pleural effusion and will continue on Lasix for diuresis.  No reports of 

chest pain, shortness of breath, or palpitations.  Patient is afebrile.  No 

reports of nausea or vomiting and patient is tolerating diet.  Patient denies 

any cough, shortness of breath, or recent fevers.  Patient does have multiple 

bruises noted on the face of bilateral orbital areas status post fall a few days

ago.  Will continue to hold anticoagulation due to thrombocytopenia and history 

of recent frequent falls.  Patient does have a history of cirrhosis with no 

significant ascites at this time.  Patient states she feels better today and 

would like to go to rehab today.  Patient will be going to [facility] for continued 

PT/OT therapy.





On exam vital signs are stable.  Temp is 97.4F, pulse is 67, respirations are 

16, blood pressure is 136/72, oxygen saturation is 99% on room air.  Cardio S1, 

S2 are muffled.  Respiratory shows diminished breath sounds at the bases with no

wheezing or rhonchi noted.  Abdomen is soft, obese, nontender.  Nervous system 

shows mild diffuse weakness.





Please refer to medication reconciliation sheet for a list of medications.





Patient Condition at Discharge: Fair





Plan - Discharge Summary


Discharge Rx Participant: No


New Discharge Prescriptions: 


New


   Spironolactone [Aldactone] 50 mg PO DAILY  tab





Continue


   sitaGLIPtin PHOSPHATE [Januvia] 100 mg PO DAILY


   Sertraline [Zoloft] 50 mg PO DAILY


   Pioglitazone [Actos] 30 mg PO DAILY


   Cholecalciferol (Vitamin D3) [Vitamin D3] 2,000 unit PO DAILY


   Cyanocobalamin [Vitamin B-12] 1,000 mcg PO DAILY #60 tab


   Niacin 2,000 mg PO DAILY


   Glimepiride [Amaryl] 2 mg PO BID #60 tab


   Aspirin 81 mg PO DAILY #30 chew


   Lactulose [Cephulac] 10 gm PO DAILY #450 ml


   Fludrocortisone [Florinef] 0.2 mg PO BID #120 tab


   Metoprolol Tartrate [Lopressor] 12.5 mg PO BID #60 tab


   Magnesium Oxide [Mag-Ox] 400 mg PO DAILY #10 tab


   Nitroglycerin Sl Tabs [Nitrostat] 0.4 mg SUBLINGUAL Q5M PRN #30 tab


     PRN Reason: Chest Pain


   Pantoprazole [Protonix] 40 mg PO AC-BRKFST #30 tablet.


   Acetaminophen Tab [Tylenol] 500 mg PO Q6HR PRN  tab


     PRN Reason: Fever And/ Or Pain


   Furosemide [Lasix] 40 mg PO BID


   Gabapentin [Neurontin] 100 mg PO TID #6 cap





Discontinued


   Spironolactone [Aldactone] 50 mg PO BID #120 tab


   Apixaban [Eliquis] 5 mg PO BID #60 tab


Discharge Medication List





Cholecalciferol (Vitamin D3) [Vitamin D3] 2,000 unit PO DAILY 11/09/19 [History]


Pioglitazone [Actos] 30 mg PO DAILY 11/09/19 [History]


Sertraline [Zoloft] 50 mg PO DAILY 11/09/19 [History]


sitaGLIPtin PHOSPHATE [Januvia] 100 mg PO DAILY 11/09/19 [History]


Cyanocobalamin [Vitamin B-12] 1,000 mcg PO DAILY #60 tab 11/12/19 [Rx]


Niacin 2,000 mg PO DAILY 03/10/20 [History]


Acetaminophen Tab [Tylenol] 500 mg PO Q6HR PRN  tab 03/20/20 [Rx]


Aspirin 81 mg PO DAILY #30 chew 03/20/20 [Rx]


Fludrocortisone [Florinef] 0.2 mg PO BID #120 tab 03/20/20 [Rx]


Glimepiride [Amaryl] 2 mg PO BID #60 tab 03/20/20 [Rx]


Lactulose [Cephulac] 10 gm PO DAILY #450 ml 03/20/20 [Rx]


Magnesium Oxide [Mag-Ox] 400 mg PO DAILY #10 tab 03/20/20 [Rx]


Metoprolol Tartrate [Lopressor] 12.5 mg PO BID #60 tab 03/20/20 [Rx]


Nitroglycerin Sl Tabs [Nitrostat] 0.4 mg SUBLINGUAL Q5M PRN #30 tab 03/20/20 

[Rx]


Pantoprazole [Protonix] 40 mg PO AC-BRKFST #30 tablet.dr 03/20/20 [Rx]


Furosemide [Lasix] 40 mg PO BID 03/31/20 [History]


Gabapentin [Neurontin] 100 mg PO TID #6 cap 04/01/20 [Rx]


Spironolactone [Aldactone] 50 mg PO DAILY  tab 04/01/20 [Rx]








Follow up Appointment(s)/Referral(s): 


Susan King MD [Primary Care Provider] - 1-2 days


Activity/Diet/Wound Care/Special Instructions: 


Patient is going to [facility]


Activity as tolerated


Continue current diet


Continue to hold anticoagulation until follow-up


Follow-up with primary care provider upon discharge


Continue with physical therapy





Discharge Disposition: TRANSFER TO SNF/ECF

## 2020-04-02 VITALS
SYSTOLIC BLOOD PRESSURE: 105 MMHG | RESPIRATION RATE: 17 BRPM | DIASTOLIC BLOOD PRESSURE: 53 MMHG | HEART RATE: 82 BPM | TEMPERATURE: 98.6 F

## 2020-04-02 LAB
ANION GAP SERPL CALC-SCNC: 8 MMOL/L
BUN SERPL-SCNC: 35 MG/DL (ref 7–17)
CALCIUM SPEC-MCNC: 8.2 MG/DL (ref 8.4–10.2)
CHLORIDE SERPL-SCNC: 105 MMOL/L (ref 98–107)
CO2 SERPL-SCNC: 22 MMOL/L (ref 22–30)
GLUCOSE SERPL-MCNC: 223 MG/DL (ref 74–99)
PH UR: 5 [PH] (ref 5–8)
POTASSIUM SERPL-SCNC: 4 MMOL/L (ref 3.5–5.1)
SODIUM SERPL-SCNC: 135 MMOL/L (ref 137–145)
SP GR UR: 1.01 (ref 1–1.03)
UROBILINOGEN UR QL STRIP: <2 MG/DL (ref ?–2)

## 2020-04-02 RX ADMIN — PANTOPRAZOLE SODIUM SCH MG: 40 TABLET, DELAYED RELEASE ORAL at 07:08

## 2020-04-02 RX ADMIN — LINAGLIPTIN SCH MG: 5 TABLET, FILM COATED ORAL at 07:10

## 2020-04-02 RX ADMIN — CEFAZOLIN SCH: 330 INJECTION, POWDER, FOR SOLUTION INTRAMUSCULAR; INTRAVENOUS at 08:57

## 2020-04-02 RX ADMIN — FLUDROCORTISONE ACETATE SCH MG: 0.1 TABLET ORAL at 07:09

## 2020-04-02 RX ADMIN — ASPIRIN 81 MG CHEWABLE TABLET SCH MG: 81 TABLET CHEWABLE at 07:08

## 2020-04-02 RX ADMIN — FUROSEMIDE SCH: 10 INJECTION, SOLUTION INTRAMUSCULAR; INTRAVENOUS at 07:10

## 2020-04-02 RX ADMIN — GABAPENTIN SCH MG: 100 CAPSULE ORAL at 15:40

## 2020-04-02 RX ADMIN — GLIMEPIRIDE SCH MG: 2 TABLET ORAL at 07:09

## 2020-04-02 RX ADMIN — PIOGLITAZONE SCH MG: 30 TABLET ORAL at 07:10

## 2020-04-02 RX ADMIN — Medication SCH MG: at 07:11

## 2020-04-02 RX ADMIN — SPIRONOLACTONE SCH MG: 25 TABLET, FILM COATED ORAL at 07:07

## 2020-04-02 RX ADMIN — Medication SCH MG: at 07:08

## 2020-04-02 RX ADMIN — LACTULOSE SCH GM: 20 SOLUTION ORAL at 07:10

## 2020-04-02 RX ADMIN — Medication SCH UNIT: at 07:08

## 2020-04-02 RX ADMIN — CYANOCOBALAMIN TAB 500 MCG SCH MCG: 500 TAB at 07:08

## 2020-04-02 RX ADMIN — GABAPENTIN SCH MG: 100 CAPSULE ORAL at 07:08

## 2020-04-02 RX ADMIN — METOPROLOL TARTRATE SCH MG: 25 TABLET, FILM COATED ORAL at 07:08

## 2020-04-02 RX ADMIN — SERTRALINE HYDROCHLORIDE SCH MG: 50 TABLET, FILM COATED ORAL at 07:09

## 2020-04-02 NOTE — CONS
CONSULTATION



DATE OF SERVICE:

04/02/2020



REASON FOR CONSULTATION:

VRE urinary tract infection and discharge.



HISTORY OF PRESENT ILLNESS:

The patient is a 79-year-old  female presenting to the ER at [facility] on March 31 after apparently the patient did have a fall. The patient did fall

down, face down. She was trying to get up in the bed and did not have the strain to

hold her walker and fell down.  The patient has been complaining of pain mostly in the

periorbital area, did have significant bruise there.  Denies losing consciousness.

Denies having any chest pain.  No shortness of breath or cough.  No nausea, vomiting.

No abdominal pain or any diarrhea.  With these symptoms, the patient was evaluated by

the ER physician.  On arrival to the ER, the patient has been afebrile and no

temperature has been recorded since the patient has been here.  The patient did have a

normal white count.  The patient did have positive UA which was cloudy, large leukocyte

esterases with 34 WBC.  The patient has been treated with no antibiotics.  Urine is now

coming back positive with VRE, which is more than 100,000 colonies.  Infectious Disease

has been consulted for discharge antibiotic recommendation.  Patient currently denies

having any burning or frequency of urine and no suprapubic or flank pain.



REVIEW OF SYSTEMS:

Positive points have been mentioned in HPI.  Rest of systems are negative.



PAST MEDICAL HISTORY:

Diabetes mellitus, hypertension, clear fluid, nonalcoholic cirrhosis, neuropathy, left

breast cancer.



PAST SURGICAL HISTORY:

Left breast lumpectomy, cholecystectomy.



SOCIAL HISTORY:

No history of smoking, drinking or drug use.



FAMILY HISTORY:

Father with history of colon cancer.  Mother with history of Parkinson's disease.



ALLERGIES:

No known drug allergies.



MEDICATIONS:

The patient is currently on Tylenol, aspirin, vitamin D3, B12, Florinef, Lasix,

Neurontin, Amaryl, lactulose, Tradjenta, vanc oxide, Lopressor, Narcan, Niacin,

Nitrostat, Protonix, Actos, Zoloft.



PHYSICAL EXAMINATION:

Blood pressure 135/53 with a pulse of 82, temperature is 98.3, she is 97% on room air.

General description is an elderly female, lying in bed in no distress.  No tachypnea or

accessory muscle for respiration use.

HEENT:  Shows slight pallor.  No scleral icterus.  The patient did have significant

bilateral periorbital bruise.  No redness.

LUNGS: Unlabored breathing, clear to auscultation anteriorly.

HEART:  S1, S2.  Regular rate and rhythm.

ABDOMEN: Soft. There is no tenderness.

EXTREMITIES:  No edema of the feet.

SKIN:  Examination no rash or mass palpable.

NEUROLOGIC:  The patient is awake, alert, oriented, mood and affect normal.



LABS:

Hemoglobin is 10 with white count 7.7, BUN of 35, creatinine 1.12. White count has been

normal, urine mentioned above.  Urine culture with VRE.



DIAGNOSTIC IMPRESSION AND PLAN:

Patient with a positive urine culture with VRE more than 100,000 colonies in this

patient admitted to the hospital where she did have a fall with significant periorbital

bruise. This patient did not have any urinary symptoms of burning or frequency with

concern for possible contamination versus colonization.



PLAN:

We will obtain a clean-catch UA which if negative, there is no need for any antibiotic

on discharge.  This has been explained in detail to the patient as well.  The nurse

practitioner working on discharge.





MMODL / IJN: [id number] / Job#: [id number]

## 2020-04-02 NOTE — CDI
Documentation Clarification Form



Date: 04/02/2020 03:18:08 PM

From: Elizabeth Finch RN, CCDS

Phone: [phone]

MRN: [id number]

Admit Date: 03/31/2020 09:55:00 AM

Patient Name: Jessica Samaniego

Visit Number: [id number]

Discharge Date:  





ATTENTION: The Clinical Documentation Specialists (CDI) and [facility]oding Staff 
appreciate your assistance in clarifying documentation. Please respond to the 
clarification below the line at the bottom and electronically sign. The CDI & 
[facility] staff will review the response and follow-up if needed. Please note: 
Queries are made part of the Legal Health Record. If you have any questions, 
please contact the author of this message via ITS.



Dr. Abby Rodarte



A pressure ulcer, coccyx was documented in the Nursing wound assessment on 4/1, 
and in the dietary consult on 4/2 additional information is requested to confirm
stage and present on admission indicators.



History/Risk Factors: Nonalcoholic cirrhosis, Diabetes Mellitus, Neuropathy 
bilateral legs/feet



Clinical Indicators: 79-year-old female who present to ED on 3/31 via EMS 
because of frequent falls, and fell on day admit and was unable to get herself 
up. 

3/31 Labs on admission WBC 6.9, HGB 10.2 HCT 32.4, BUN 32, CR 1.05, Glucose 208,
Total Protein 5.2, Albumin 2.6, Lactic acid 1.4

Vital signs on admission: 138/65 85 18 97.8 97 % RA

3/31 ER Rectal exam: deferred, 

4/1 Nursing wound assessment: Pressure injury Stage II 

Location: Coccyx 

Wound description: No drainage, stage II



Treatment: 

Dietary Consult

Skin Integrity monitoring

Monitor I/O

Glucerna BID, Carbohydrate -Fat modified diet

Monitor and control blood sugar





Elements for accurate and compliant documentation of an ulcer:

   *The location/laterality of the ulcer

    *Etiology (decubitus/pressure, diabetic, PVD)

    *Stage I-IV, Unstageable, Suspected Deep Tissue Injury (To the deepest 
stage)

     *If the ulcer was present at admission (POA) or occurred after admission



In your professional opinion, can you please clarify the diagnosis, location, 
laterality and whether present on admission (POA):



     Stage 1 Pressure/Decubitus Ulcer (intact skin, non-blanching redness of 
local area)

     Stage 2 Pressure/Decubitus Ulcer (Partial thickness, loss of dermis, pink 
wound bed)

     Stage 3 Pressure/Decubitus Ulcer (Full thickness tissue loss)

     Stage 4 Pressure/Decubitus Ulcer (Full thickness tissue loss with exposed 
bone, tendon, or muscle.    May have slough or eschar present)  

     Unstageable

     Other condition, please specify 

     Unable to determine



Please indicate etiology of pressure ulcer (if known).



(Last Revision: October 2017)

___________________________________________________________________________

Stage 2 Pressure/Decubitus Ulcer (Partial thickness, loss of dermis, pink wound 
bed) present on admission

MTDD

## 2020-04-28 ENCOUNTER — HOSPITAL ENCOUNTER (INPATIENT)
Dept: HOSPITAL 47 - EC | Age: 80
LOS: 6 days | DRG: 871 | End: 2020-05-04
Attending: HOSPITALIST | Admitting: HOSPITALIST
Payer: MEDICARE

## 2020-04-28 VITALS — BODY MASS INDEX: 37.5 KG/M2

## 2020-04-28 DIAGNOSIS — R40.2244: ICD-10-CM

## 2020-04-28 DIAGNOSIS — I13.0: ICD-10-CM

## 2020-04-28 DIAGNOSIS — I48.0: ICD-10-CM

## 2020-04-28 DIAGNOSIS — I50.33: ICD-10-CM

## 2020-04-28 DIAGNOSIS — E87.1: ICD-10-CM

## 2020-04-28 DIAGNOSIS — Z79.01: ICD-10-CM

## 2020-04-28 DIAGNOSIS — Z87.440: ICD-10-CM

## 2020-04-28 DIAGNOSIS — R18.8: ICD-10-CM

## 2020-04-28 DIAGNOSIS — Z79.82: ICD-10-CM

## 2020-04-28 DIAGNOSIS — Z91.81: ICD-10-CM

## 2020-04-28 DIAGNOSIS — I25.2: ICD-10-CM

## 2020-04-28 DIAGNOSIS — N17.0: ICD-10-CM

## 2020-04-28 DIAGNOSIS — Z66: ICD-10-CM

## 2020-04-28 DIAGNOSIS — Z82.49: ICD-10-CM

## 2020-04-28 DIAGNOSIS — Z82.0: ICD-10-CM

## 2020-04-28 DIAGNOSIS — R31.0: ICD-10-CM

## 2020-04-28 DIAGNOSIS — I25.10: ICD-10-CM

## 2020-04-28 DIAGNOSIS — K76.6: ICD-10-CM

## 2020-04-28 DIAGNOSIS — J96.01: ICD-10-CM

## 2020-04-28 DIAGNOSIS — K75.81: ICD-10-CM

## 2020-04-28 DIAGNOSIS — Z79.84: ICD-10-CM

## 2020-04-28 DIAGNOSIS — E78.5: ICD-10-CM

## 2020-04-28 DIAGNOSIS — D68.32: ICD-10-CM

## 2020-04-28 DIAGNOSIS — R40.2364: ICD-10-CM

## 2020-04-28 DIAGNOSIS — K72.90: ICD-10-CM

## 2020-04-28 DIAGNOSIS — R29.6: ICD-10-CM

## 2020-04-28 DIAGNOSIS — E87.4: ICD-10-CM

## 2020-04-28 DIAGNOSIS — Z80.0: ICD-10-CM

## 2020-04-28 DIAGNOSIS — J96.02: ICD-10-CM

## 2020-04-28 DIAGNOSIS — Z51.5: ICD-10-CM

## 2020-04-28 DIAGNOSIS — E87.5: ICD-10-CM

## 2020-04-28 DIAGNOSIS — E11.22: ICD-10-CM

## 2020-04-28 DIAGNOSIS — J12.89: ICD-10-CM

## 2020-04-28 DIAGNOSIS — F43.9: ICD-10-CM

## 2020-04-28 DIAGNOSIS — R26.9: ICD-10-CM

## 2020-04-28 DIAGNOSIS — Z79.899: ICD-10-CM

## 2020-04-28 DIAGNOSIS — D63.1: ICD-10-CM

## 2020-04-28 DIAGNOSIS — R40.2144: ICD-10-CM

## 2020-04-28 DIAGNOSIS — G93.41: ICD-10-CM

## 2020-04-28 DIAGNOSIS — I87.8: ICD-10-CM

## 2020-04-28 DIAGNOSIS — S00.83XA: ICD-10-CM

## 2020-04-28 DIAGNOSIS — T45.515A: ICD-10-CM

## 2020-04-28 DIAGNOSIS — A41.89: Primary | ICD-10-CM

## 2020-04-28 DIAGNOSIS — J44.0: ICD-10-CM

## 2020-04-28 DIAGNOSIS — Z85.3: ICD-10-CM

## 2020-04-28 DIAGNOSIS — F32.9: ICD-10-CM

## 2020-04-28 DIAGNOSIS — K74.69: ICD-10-CM

## 2020-04-28 DIAGNOSIS — R79.89: ICD-10-CM

## 2020-04-28 DIAGNOSIS — N18.3: ICD-10-CM

## 2020-04-28 DIAGNOSIS — Z90.49: ICD-10-CM

## 2020-04-28 DIAGNOSIS — E66.9: ICD-10-CM

## 2020-04-28 DIAGNOSIS — U07.1: ICD-10-CM

## 2020-04-28 DIAGNOSIS — R65.21: ICD-10-CM

## 2020-04-28 DIAGNOSIS — E11.40: ICD-10-CM

## 2020-04-28 LAB
ALBUMIN SERPL-MCNC: 2.8 G/DL (ref 3.5–5)
ALP SERPL-CCNC: 88 U/L (ref 38–126)
ALT SERPL-CCNC: 21 U/L (ref 4–34)
ANION GAP SERPL CALC-SCNC: 9 MMOL/L
APTT BLD: 26.2 SEC (ref 22–30)
AST SERPL-CCNC: 33 U/L (ref 14–36)
BASOPHILS # BLD AUTO: 0 K/UL (ref 0–0.2)
BASOPHILS NFR BLD AUTO: 0 %
BUN SERPL-SCNC: 83 MG/DL (ref 7–17)
CALCIUM SPEC-MCNC: 9 MG/DL (ref 8.4–10.2)
CHLORIDE SERPL-SCNC: 103 MMOL/L (ref 98–107)
CO2 SERPL-SCNC: 24 MMOL/L (ref 22–30)
EOSINOPHIL # BLD AUTO: 0.2 K/UL (ref 0–0.7)
EOSINOPHIL NFR BLD AUTO: 2 %
ERYTHROCYTE [DISTWIDTH] IN BLOOD BY AUTOMATED COUNT: 4.1 M/UL (ref 3.8–5.4)
ERYTHROCYTE [DISTWIDTH] IN BLOOD: 17.7 % (ref 11.5–15.5)
GLUCOSE BLD-MCNC: 158 MG/DL (ref 75–99)
GLUCOSE SERPL-MCNC: 153 MG/DL (ref 74–99)
HCT VFR BLD AUTO: 33.6 % (ref 34–46)
HGB BLD-MCNC: 10 GM/DL (ref 11.4–16)
INR PPP: 1.3 (ref ?–1.2)
LYMPHOCYTES # SPEC AUTO: 1 K/UL (ref 1–4.8)
LYMPHOCYTES NFR SPEC AUTO: 8 %
MCH RBC QN AUTO: 24.5 PG (ref 25–35)
MCHC RBC AUTO-ENTMCNC: 29.9 G/DL (ref 31–37)
MCV RBC AUTO: 81.9 FL (ref 80–100)
MONOCYTES # BLD AUTO: 0.8 K/UL (ref 0–1)
MONOCYTES NFR BLD AUTO: 7 %
NEUTROPHILS # BLD AUTO: 10.3 K/UL (ref 1.3–7.7)
NEUTROPHILS NFR BLD AUTO: 82 %
PLATELET # BLD AUTO: 153 K/UL (ref 150–450)
POTASSIUM SERPL-SCNC: 4.6 MMOL/L (ref 3.5–5.1)
PROT SERPL-MCNC: 5.7 G/DL (ref 6.3–8.2)
PT BLD: 13.3 SEC (ref 9–12)
SODIUM SERPL-SCNC: 136 MMOL/L (ref 137–145)
WBC # BLD AUTO: 12.6 K/UL (ref 3.8–10.6)

## 2020-04-28 PROCEDURE — 84443 ASSAY THYROID STIM HORMONE: CPT

## 2020-04-28 PROCEDURE — 85730 THROMBOPLASTIN TIME PARTIAL: CPT

## 2020-04-28 PROCEDURE — 83880 ASSAY OF NATRIURETIC PEPTIDE: CPT

## 2020-04-28 PROCEDURE — 87205 SMEAR GRAM STAIN: CPT

## 2020-04-28 PROCEDURE — 87075 CULTR BACTERIA EXCEPT BLOOD: CPT

## 2020-04-28 PROCEDURE — 70551 MRI BRAIN STEM W/O DYE: CPT

## 2020-04-28 PROCEDURE — 84145 PROCALCITONIN (PCT): CPT

## 2020-04-28 PROCEDURE — 96365 THER/PROPH/DIAG IV INF INIT: CPT

## 2020-04-28 PROCEDURE — 85652 RBC SED RATE AUTOMATED: CPT

## 2020-04-28 PROCEDURE — 70486 CT MAXILLOFACIAL W/O DYE: CPT

## 2020-04-28 PROCEDURE — 99291 CRITICAL CARE FIRST HOUR: CPT

## 2020-04-28 PROCEDURE — 80053 COMPREHEN METABOLIC PANEL: CPT

## 2020-04-28 PROCEDURE — 82140 ASSAY OF AMMONIA: CPT

## 2020-04-28 PROCEDURE — 82746 ASSAY OF FOLIC ACID SERUM: CPT

## 2020-04-28 PROCEDURE — 85025 COMPLETE CBC W/AUTO DIFF WBC: CPT

## 2020-04-28 PROCEDURE — 70450 CT HEAD/BRAIN W/O DYE: CPT

## 2020-04-28 PROCEDURE — 89050 BODY FLUID CELL COUNT: CPT

## 2020-04-28 PROCEDURE — 96376 TX/PRO/DX INJ SAME DRUG ADON: CPT

## 2020-04-28 PROCEDURE — 81001 URINALYSIS AUTO W/SCOPE: CPT

## 2020-04-28 PROCEDURE — 76705 ECHO EXAM OF ABDOMEN: CPT

## 2020-04-28 PROCEDURE — 84484 ASSAY OF TROPONIN QUANT: CPT

## 2020-04-28 PROCEDURE — 87635 SARS-COV-2 COVID-19 AMP PRB: CPT

## 2020-04-28 PROCEDURE — 87086 URINE CULTURE/COLONY COUNT: CPT

## 2020-04-28 PROCEDURE — 86140 C-REACTIVE PROTEIN: CPT

## 2020-04-28 PROCEDURE — 83615 LACTATE (LD) (LDH) ENZYME: CPT

## 2020-04-28 PROCEDURE — 94660 CPAP INITIATION&MGMT: CPT

## 2020-04-28 PROCEDURE — 71045 X-RAY EXAM CHEST 1 VIEW: CPT

## 2020-04-28 PROCEDURE — 36415 COLL VENOUS BLD VENIPUNCTURE: CPT

## 2020-04-28 PROCEDURE — 80202 ASSAY OF VANCOMYCIN: CPT

## 2020-04-28 PROCEDURE — 87070 CULTURE OTHR SPECIMN AEROBIC: CPT

## 2020-04-28 PROCEDURE — 80061 LIPID PANEL: CPT

## 2020-04-28 PROCEDURE — 85379 FIBRIN DEGRADATION QUANT: CPT

## 2020-04-28 PROCEDURE — 84300 ASSAY OF URINE SODIUM: CPT

## 2020-04-28 PROCEDURE — 87040 BLOOD CULTURE FOR BACTERIA: CPT

## 2020-04-28 PROCEDURE — 96361 HYDRATE IV INFUSION ADD-ON: CPT

## 2020-04-28 PROCEDURE — 36600 WITHDRAWAL OF ARTERIAL BLOOD: CPT

## 2020-04-28 PROCEDURE — 82607 VITAMIN B-12: CPT

## 2020-04-28 PROCEDURE — 82550 ASSAY OF CK (CPK): CPT

## 2020-04-28 PROCEDURE — 49083 ABD PARACENTESIS W/IMAGING: CPT

## 2020-04-28 PROCEDURE — 85610 PROTHROMBIN TIME: CPT

## 2020-04-28 PROCEDURE — 94640 AIRWAY INHALATION TREATMENT: CPT

## 2020-04-28 PROCEDURE — 85027 COMPLETE CBC AUTOMATED: CPT

## 2020-04-28 PROCEDURE — 83605 ASSAY OF LACTIC ACID: CPT

## 2020-04-28 PROCEDURE — 93005 ELECTROCARDIOGRAM TRACING: CPT

## 2020-04-28 PROCEDURE — 80048 BASIC METABOLIC PNL TOTAL CA: CPT

## 2020-04-28 PROCEDURE — 95819 EEG AWAKE AND ASLEEP: CPT

## 2020-04-28 PROCEDURE — 82945 GLUCOSE OTHER FLUID: CPT

## 2020-04-28 PROCEDURE — 82805 BLOOD GASES W/O2 SATURATION: CPT

## 2020-04-28 PROCEDURE — 83550 IRON BINDING TEST: CPT

## 2020-04-28 PROCEDURE — 83540 ASSAY OF IRON: CPT

## 2020-04-28 RX ADMIN — CEFAZOLIN SCH MLS/HR: 330 INJECTION, POWDER, FOR SOLUTION INTRAMUSCULAR; INTRAVENOUS at 21:36

## 2020-04-28 NOTE — HP
HISTORY AND PHYSICAL



DATE OF SERVICE:

04/28/2020



CHIEF COMPLAINTS:

Shortness of breath and weakness.



HISTORY OF PRESENT ILLNESS:

This 79-year-old woman with a past medical history of multiple medical problems,

including atrial fibrillation, CHF, diabetes, hypertension, cirrhosis of the 
liver, was

recently admitted with falls and weakness. The patient also had atrial 
fibrillation

with rapid ventricular rate  during that time. [facility]
rehab.

While in rehab the patient became increasingly weak and short of breath and the 
patient

came to [facility] and was found to have bilateral pneumonia, admitted 
for

further evaluation and treatment. The patient had multiple clinical 
abnormalities and

COVID-19 was suspected, even though the test was negative.  The patient also had

multiple skin lesions also with patient is drowsy and unable to give a coherent

history. Most of the history is from my discussion with staff and the ER 
physician as

well as review of the chart.



The patient apparently was taken off Eliquis, also, by Cardiology.



The patient is followed by Dr. King in the outpatient setting.



PAST MEDICAL HISTORY:

Atrial fibrillation, history of CHF, diabetes mellitus, hypertension, liver 
disease.



HOME MEDICATIONS:

Medications are to be confirmed:

1. Januvia 100 mg p.o. daily.

2. Aldactone 50 mg p.o. daily.

3. Zoloft 50 mg p.o. daily.

4. Actos 30 mg p.o. daily.

5. Protonix 40 mg with breakfast.

6. Nitrostat 0.4 sublingually p.r.n.

7. Niacin 2000 mg p.o. daily.

8. Lopressor 12.5 mg p.o. b.i.d.

9. Magnesium oxide 400 mg p.o. daily.

10.Lactulose 10 mg p.o. daily.

11.Amaryl 2 mg p.o. b.i.d.

12.Neurontin 100 mg p.o. t.i.d.

13.Lasix 40 mg p.o. b.i.d.

14.Florinef 0.2 b.i.d.

15.Vitamin B12 1000 mcg p.o. daily.

16.Vitamin D3 2000 daily.

17.Aspirin 81 mg p.o. daily.

18.Tylenol 500 mg q.6 p.r.n.



ALLERGIES:

NONE.



Family history, social history, review of systems could not be taken because of 
the

patient's mentation.  No smoking or alcohol per chart.



PHYSICAL EXAMINATION:

Patient is arousable, confused.  Pulse 80, blood pressure 118/60, respiration 
18,

temperature 98.1, pulse ox 94% on 5 L.

HEENT:  Conjunctivae normal.  Oral mucosa dry.

NECK: No jugular venous distention. No carotid bruit. No lymph node enlargement.

CARDIOVASCULAR SYSTEM:  S1, S2 muffled.

RESPIRATORY SYSTEM: Breath sounds diminished at the bases.  Bilateral scattered 
rhonchi

and crackles. Expiratory wheezing also present, especially at the base.

ABDOMEN: Soft, obese, non-tender.

LEGS: Bilateral leg edema. Bilateral leg ulcers also present.

NERVOUS SYSTEM: Higher functions as mentioned earlier. Moves all 4 limbs. No 
focal

motor or sensory deficit.

LYMPHATICS: No lymph node palpable in neck, axillae or groin.

SKIN: No ulcer, rash, bleeding.

JOINTS: No active deforming arthropathy.



LABS:

WBC 12.6, hemoglobin 10.  INR is 1.3.  Creatinine is 2.19. Baseline creatinine 
is 1.12.

Plasma lactic acid 2.8.  Troponin 0.069.  Albumin is 2.8.



ASSESSMENT:

1. Shortness of breath, possible bilateral pneumonia, possibly community-
acquired,

    possibly COVID-19-associated pneumonia.

2. Suspected COVID-19; tested negative.

3. Acute on chronic renal failure with chronic renal failure, stage III 
baseline,

    possibly prerenal acute tubular necrosis.

4. Elevated lactic acid, possibly secondary to sepsis secondary to pneumonia.

5. Troponin 0.069, indeterminate. Rule out acute non-ST-segment-elevation 
myocardial

    infarction.

6. Hypoalbuminemia.

7. Hyponatremia.

8. Change in mental status, metabolic encephalopathy, acute on chronic.

9. Generalized gait dysfunction.

10.Increased white count.

11.Anemia, normocytic.

12.History of multiple falls recently.

13.History of atrial fibrillation.

14.History of congestive heart failure.

15.Diabetes mellitus, type 2.

16.Hypertension.

17.History of chronic liver disease and non-alcoholic cirrhosis of the liver.

18.History of urinary tract infections.

19.History of recurrent ascites and paracentesis.

20.History of thrombocytopenia.

21.History of left breast cancer with lumpectomy.

22.History of vancomycin-resistant Enterococcus.

23.History of depression.

24.History of cholecystectomy.

25.Obesity with body mass index 32.3.

26.FULL CODE.



RECOMMENDATIONS AND DISCUSSION:

In this 79-year-old woman who presented with multiple complex medical issues, we
will

monitor the patient closely, continue the current medications, continue with

symptomatic treatment.  IV heparin has been initiated.  Cardiology will be 
consulted.

We will initiate broad-spectrum IV antibiotics.  I would also recommend 
pulmonary and

infectious disease consultations for possible COVID-19 infection. Otherwise, 
continue

to monitor.  Resume the home medication once it is reconciled.  Prognosis is 
extremely

guarded. Further recommendations to follow. A copy of this dictation is being 
forwarded

to Dr. King, who is the primary physician.





MMODL / IJN: [id number] / Job#: [id number]

[id number]

## 2020-04-28 NOTE — XR
EXAMINATION TYPE: XR chest 1V portable

 

DATE OF EXAM: 4/28/2020

 

COMPARISON: 3/31/2020

 

INDICATION: Dyspnea

 

TECHNIQUE: Single frontal view of the chest is obtained.

 

FINDINGS:  

The heart size is enlarged.  

The pulmonary vasculature is prominent.  

Degree of inspiration is somewhat limited. There are patchy infiltrates within the left perihilar reg
ion and in the right lower lobe and periphery of the right upper lung field. Correlate for atypical p
neumonia.  

 

 

IMPRESSION:  

1. Diffuse patchy infiltrates in the periphery right lower lobe and left perihilar regions. Correlate
 for atypical pneumonia.

2. Cardiomegaly. Some vascular prominence may be present and congestive heart failure could be consid
ered within the differential.

## 2020-04-28 NOTE — ED
General Adult HPI





- General


Chief complaint: Shortness of Breath


Stated complaint: CHETNA


Source: patient


Mode of arrival: ambulatory


Limitations: no limitations





- History of Present Illness


Initial comments: 


Dictation was produced using dragon dictation software. please excuse any 

grammatical, word or spelling errors. 





This patient was cared for during a federal and state declared state of 

emergency secondary to Covid 19





Chief Complaint: 79-year-old female past medical history atrial fibrillation, 

heart failure diabetes presents with shortness of breath





History of Present Illness: 79-year-old female she is brought to the emergency 

department from home.  According to EMS patient was recently admitted to many 

large nursing home.  Couple of days ago patient was discharged from nursing home

and sent home.  Patient was short of breath for the last 24-48 hours.  She 

denies any pain complaints at this time.  She does report having history of 

atrial fibrillation and heart failure.  She states that she is not currently on 

any blood thinner medications.  She reports she used to be on blood thinners.  

She is unable to explain why she is not currently on blood thinners.  She does 

take metoprolol.  Denies any fever, chills or night sweats.








The ROS documented in this emergency department record has been reviewed and 

confirmed by me.  Those systems with pertinent positive or negative responses 

have been documented in the HPI.  All other systems are other negative and/or 

noncontributory.








PHYSICAL EXAM:


General Impression: Alert and oriented x3, not in acute distress


HEENT: Normocephalic atraumatic, extra-ocular movements intact, pupils equal and

reactive to light bilaterally, mucous membranes moist.


Cardiovascular: Irregularly irregular


Chest: Able to complete full sentences, no retractions, no tachypnea


Abdomen: abdomen soft, non-tender, non-distended, no organomegaly


Musculoskeletal: Pulses present and equal in all extremities, 2+ pitting edema 

to bilateral lower extremities


Motor:  no focal deficits noted


Neurological: CN II-XII grossly intact, no focal motor or sensory deficits noted


Skin: Intact with no visualized rashes


Psych: Normal affect and mood





ED course: 79-year-old female with multiple comorbidities presents with dyspnea.

 Upon arrival shows heart rate of 132, worse vital signs within acceptable 

limits.  Patient was satting 100% on nonrebreather 15 L.  Her oxygen was weaned 

to 5 L nasal cannula.  Patient able to complete full sentences she's not showing

signs of significant respiratory distress.  She does appear to be in atrial 

fibrillation with rapid ventricular rate.  Chart review was performed.  Patient 

was last seen by cardiology on April 1.  It appears that patient was concerning 

for frequent falls.  Her glucose was discontinued by cardiology on April 1.  

Patient converted spontaneously on her own.  Repeat EKG shows sinus rhythm.


Laboratory evaluation obtained.  Mild leukocytosis of 12.6.  Slightly secondary 

to stress reaction.  Hemoglobin 10.0 which appears to be patient's baseline.  

Rest of CBC is within acceptable limits.  Coag panel is unremarkable.  Metabolic

panel shows elevated renal markers were creatinine of 2.19 and BUN of 83.  

Lactic acidosis of 2.8, troponin 0.069, prematurity peptide of 13,800.  

Coronavirus test is negative.  Chest x-ray shows diffuse patchy infiltrates 

concerning for possible atypical pneumonia.  There is also cardiomegaly concern 

for heart failure.  At this point I believe patient's shortness of breath was 

from H fibrillation with rapid ventricular rate.  She appeared to have converted

spontaneously.  Considering her lab abnormalities patient given 1 L normal 

saline bolus.  Considering patient's x-ray there is concern for hospital-

acquired pneumonia because she was recently admitted and was at a nursing home. 

Vancomycin and cefepime as ordered.  Pending urine studies and blood culture.  

The patient's coronavirus test was negative according to infection control there

is only 70% specificity.  There is 30% false negative rate.  Patient be remained

on droplet precautions.  Discussed patient case with Dr. Lundy's went except 

patient's care.





EKG interpretation: Ventricular rate 140, A. fib with RVR, QRS 74, . No 

CO prolongation, no QTC prolongation, no ST or T-wave changes noted.  EKG 

compared to March 31 2020 showing no changes.  Overall, this EKG is unremarkable











- Related Data


                                Home Medications











 Medication  Instructions  Recorded  Confirmed


 


Cholecalciferol (Vitamin D3) 2,000 unit PO DAILY 11/09/19 03/31/20





[Vitamin D3]   


 


Pioglitazone [Actos] 30 mg PO DAILY 11/09/19 03/31/20


 


Sertraline [Zoloft] 50 mg PO DAILY 11/09/19 03/31/20


 


sitaGLIPtin PHOSPHATE [Januvia] 100 mg PO DAILY 11/09/19 03/31/20


 


Niacin 2,000 mg PO DAILY 03/10/20 03/31/20


 


Furosemide [Lasix] 40 mg PO BID 03/31/20 03/31/20








                                  Previous Rx's











 Medication  Instructions  Recorded


 


Cyanocobalamin [Vitamin B-12] 1,000 mcg PO DAILY #60 tab 11/12/19


 


Acetaminophen Tab [Tylenol] 500 mg PO Q6HR PRN  tab 03/20/20


 


Aspirin 81 mg PO DAILY #30 chew 03/20/20


 


Fludrocortisone [Florinef] 0.2 mg PO BID #120 tab 03/20/20


 


Glimepiride [Amaryl] 2 mg PO BID #60 tab 03/20/20


 


Lactulose [Cephulac] 10 gm PO DAILY #450 ml 03/20/20


 


Magnesium Oxide [Mag-Ox] 400 mg PO DAILY #10 tab 03/20/20


 


Metoprolol Tartrate [Lopressor] 12.5 mg PO BID #60 tab 03/20/20


 


Nitroglycerin Sl Tabs [Nitrostat] 0.4 mg SUBLINGUAL Q5M PRN #30 tab 03/20/20


 


Pantoprazole [Protonix] 40 mg PO AC-BRKFST #30 tablet.dr 03/20/20


 


Gabapentin [Neurontin] 100 mg PO TID #6 cap 04/01/20


 


Spironolactone [Aldactone] 50 mg PO DAILY  tab 04/01/20











                                    Allergies











Allergy/AdvReac Type Severity Reaction Status Date / Time


 


No Known Allergies Allergy   Verified 03/31/20 11:14














Review of Systems


ROS Statement: 


Those systems with pertinent positive or pertinent negative responses have been 

documented in the HPI.





ROS Other: All systems not noted in ROS Statement are negative.





Past Medical History


Past Medical History: Atrial Fibrillation, Cancer, Heart Failure, Diabetes 

Mellitus, Hypertension, Liver Disease


Additional Past Medical History / Comment(s): Pt recently admitted to [facility] on 

3/11/20 with possible NSTEMI, paroxysmal Afib/RVR, hypovolemia 2ndary to large v

olume paracentesis, acute UTI, acute renal failure.     Other hx:   Nonalcoholic

 cirrhosis-thought d/t rx, recurrent ascities/paracentesis, thrombocytopenia, 

portal htn, L breast cancer with lumpectomy, NIDDM type II, neuropathy bilateral

 legs/feet,


History of Any Multi-Drug Resistant Organisms: VRE


Date of last positivie culture/infection: 3/31/20


MDRO Source:: VRE URINE


Past Surgical History: Breast Surgery, Cholecystectomy


Additional Past Surgical History / Comment(s): L breast lumpectomy for cancer, 

right breast lump removal-benign, paracentesis-several,


Past Anesthesia/Blood Transfusion Reactions: No Reported Reaction


Past Psychological History: Depression


Smoking Status: Never smoker


Past Alcohol Use History: None Reported


Past Drug Use History: None Reported





- Past Family History


  ** Father


Family Medical History: Cancer


Additional Family Medical History / Comment(s): Colon cancer





  ** Brother(s)


Family Medical History: Myocardial Infarction (MI)


Additional Family Medical History / Comment(s): One brother with stents, another

 brother passed away from an MI





  ** Mother


Family Medical History: Neurologic Disorder


Additional Family Medical History / Comment(s): Parkinsons





General Exam


Limitations: no limitations





Course


                                   Vital Signs











  04/28/20 04/28/20 04/28/20





  14:44 15:17 15:59


 


Temperature 98.1 F  


 


Pulse Rate 132 H 80 80


 


Respiratory 22  18





Rate   


 


Blood Pressure 113/78  118/63


 


O2 Sat by Pulse 100  95





Oximetry   














Medical Decision Making





- Lab Data


Result diagrams: 


                                 04/28/20 14:52





                                 04/28/20 14:52


                                   Lab Results











  04/28/20 04/28/20 04/28/20 Range/Units





  14:52 14:52 14:52 


 


WBC  12.6 H    (3.8-10.6)  k/uL


 


RBC  4.10    (3.80-5.40)  m/uL


 


Hgb  10.0 L    (11.4-16.0)  gm/dL


 


Hct  33.6 L    (34.0-46.0)  %


 


MCV  81.9    (80.0-100.0)  fL


 


MCH  24.5 L    (25.0-35.0)  pg


 


MCHC  29.9 L    (31.0-37.0)  g/dL


 


RDW  17.7 H    (11.5-15.5)  %


 


Plt Count  153  D    (150-450)  k/uL


 


Neutrophils %  82    %


 


Lymphocytes %  8    %


 


Monocytes %  7    %


 


Eosinophils %  2    %


 


Basophils %  0    %


 


Neutrophils #  10.3 H    (1.3-7.7)  k/uL


 


Lymphocytes #  1.0    (1.0-4.8)  k/uL


 


Monocytes #  0.8    (0-1.0)  k/uL


 


Eosinophils #  0.2    (0-0.7)  k/uL


 


Basophils #  0.0    (0-0.2)  k/uL


 


Hypochromasia  Marked    


 


Poikilocytosis  Slight    


 


Anisocytosis  Slight    


 


PT   13.3 H   (9.0-12.0)  sec


 


INR   1.3 H   (<1.2)  


 


APTT   26.2   (22.0-30.0)  sec


 


Sodium    136 L  (137-145)  mmol/L


 


Potassium    4.6  (3.5-5.1)  mmol/L


 


Chloride    103  ()  mmol/L


 


Carbon Dioxide    24  (22-30)  mmol/L


 


Anion Gap    9  mmol/L


 


BUN    83 H  (7-17)  mg/dL


 


Creatinine    2.19 H  (0.52-1.04)  mg/dL


 


Est GFR (CKD-EPI)AfAm    24  (>60 ml/min/1.73 sqM)  


 


Est GFR (CKD-EPI)NonAf    21  (>60 ml/min/1.73 sqM)  


 


Glucose    153 H  (74-99)  mg/dL


 


Plasma Lactic Acid Joaquín     (0.7-2.0)  mmol/L


 


Calcium    9.0  (8.4-10.2)  mg/dL


 


Total Bilirubin    1.0  (0.2-1.3)  mg/dL


 


AST    33  (14-36)  U/L


 


ALT    21  (4-34)  U/L


 


Alkaline Phosphatase    88  ()  U/L


 


Troponin I     (0.000-0.034)  ng/mL


 


NT-Pro-B Natriuret Pep     pg/mL


 


Total Protein    5.7 L  (6.3-8.2)  g/dL


 


Albumin    2.8 L  (3.5-5.0)  g/dL


 


Coronavirus (PCR)     (Not Detectd)  














  04/28/20 04/28/20 04/28/20 Range/Units





  14:52 14:52 14:52 


 


WBC     (3.8-10.6)  k/uL


 


RBC     (3.80-5.40)  m/uL


 


Hgb     (11.4-16.0)  gm/dL


 


Hct     (34.0-46.0)  %


 


MCV     (80.0-100.0)  fL


 


MCH     (25.0-35.0)  pg


 


MCHC     (31.0-37.0)  g/dL


 


RDW     (11.5-15.5)  %


 


Plt Count     (150-450)  k/uL


 


Neutrophils %     %


 


Lymphocytes %     %


 


Monocytes %     %


 


Eosinophils %     %


 


Basophils %     %


 


Neutrophils #     (1.3-7.7)  k/uL


 


Lymphocytes #     (1.0-4.8)  k/uL


 


Monocytes #     (0-1.0)  k/uL


 


Eosinophils #     (0-0.7)  k/uL


 


Basophils #     (0-0.2)  k/uL


 


Hypochromasia     


 


Poikilocytosis     


 


Anisocytosis     


 


PT     (9.0-12.0)  sec


 


INR     (<1.2)  


 


APTT     (22.0-30.0)  sec


 


Sodium     (137-145)  mmol/L


 


Potassium     (3.5-5.1)  mmol/L


 


Chloride     ()  mmol/L


 


Carbon Dioxide     (22-30)  mmol/L


 


Anion Gap     mmol/L


 


BUN     (7-17)  mg/dL


 


Creatinine     (0.52-1.04)  mg/dL


 


Est GFR (CKD-EPI)AfAm     (>60 ml/min/1.73 sqM)  


 


Est GFR (CKD-EPI)NonAf     (>60 ml/min/1.73 sqM)  


 


Glucose     (74-99)  mg/dL


 


Plasma Lactic Acid Joaquín  2.8 H*    (0.7-2.0)  mmol/L


 


Calcium     (8.4-10.2)  mg/dL


 


Total Bilirubin     (0.2-1.3)  mg/dL


 


AST     (14-36)  U/L


 


ALT     (4-34)  U/L


 


Alkaline Phosphatase     ()  U/L


 


Troponin I   0.069 H*   (0.000-0.034)  ng/mL


 


NT-Pro-B Natriuret Pep    18175  pg/mL


 


Total Protein     (6.3-8.2)  g/dL


 


Albumin     (3.5-5.0)  g/dL


 


Coronavirus (PCR)     (Not Detectd)  














  04/28/20 Range/Units





  15:54 


 


WBC   (3.8-10.6)  k/uL


 


RBC   (3.80-5.40)  m/uL


 


Hgb   (11.4-16.0)  gm/dL


 


Hct   (34.0-46.0)  %


 


MCV   (80.0-100.0)  fL


 


MCH   (25.0-35.0)  pg


 


MCHC   (31.0-37.0)  g/dL


 


RDW   (11.5-15.5)  %


 


Plt Count   (150-450)  k/uL


 


Neutrophils %   %


 


Lymphocytes %   %


 


Monocytes %   %


 


Eosinophils %   %


 


Basophils %   %


 


Neutrophils #   (1.3-7.7)  k/uL


 


Lymphocytes #   (1.0-4.8)  k/uL


 


Monocytes #   (0-1.0)  k/uL


 


Eosinophils #   (0-0.7)  k/uL


 


Basophils #   (0-0.2)  k/uL


 


Hypochromasia   


 


Poikilocytosis   


 


Anisocytosis   


 


PT   (9.0-12.0)  sec


 


INR   (<1.2)  


 


APTT   (22.0-30.0)  sec


 


Sodium   (137-145)  mmol/L


 


Potassium   (3.5-5.1)  mmol/L


 


Chloride   ()  mmol/L


 


Carbon Dioxide   (22-30)  mmol/L


 


Anion Gap   mmol/L


 


BUN   (7-17)  mg/dL


 


Creatinine   (0.52-1.04)  mg/dL


 


Est GFR (CKD-EPI)AfAm   (>60 ml/min/1.73 sqM)  


 


Est GFR (CKD-EPI)NonAf   (>60 ml/min/1.73 sqM)  


 


Glucose   (74-99)  mg/dL


 


Plasma Lactic Acid Joaquín   (0.7-2.0)  mmol/L


 


Calcium   (8.4-10.2)  mg/dL


 


Total Bilirubin   (0.2-1.3)  mg/dL


 


AST   (14-36)  U/L


 


ALT   (4-34)  U/L


 


Alkaline Phosphatase   ()  U/L


 


Troponin I   (0.000-0.034)  ng/mL


 


NT-Pro-B Natriuret Pep   pg/mL


 


Total Protein   (6.3-8.2)  g/dL


 


Albumin   (3.5-5.0)  g/dL


 


Coronavirus (PCR)  Not Detected  (Not Detectd)  














Critical Care Time


Critical Care Time: Yes


Total Critical Care Time: 33





Disposition


Clinical Impression: 


 Dyspnea





Disposition: ADMITTED AS IP TO THIS [facility]


Condition: Fair


Referrals: 


Susan King MD [Primary Care Provider] - 1-2 days


Decision Time: 16:41

## 2020-04-29 LAB
ANION GAP SERPL CALC-SCNC: 8 MMOL/L
BASOPHILS # BLD AUTO: 0.1 K/UL (ref 0–0.2)
BASOPHILS NFR BLD AUTO: 0 %
BUN SERPL-SCNC: 84 MG/DL (ref 7–17)
CALCIUM SPEC-MCNC: 8.9 MG/DL (ref 8.4–10.2)
CHLORIDE SERPL-SCNC: 106 MMOL/L (ref 98–107)
CO2 SERPL-SCNC: 25 MMOL/L (ref 22–30)
EOSINOPHIL # BLD AUTO: 0.3 K/UL (ref 0–0.7)
EOSINOPHIL NFR BLD AUTO: 2 %
ERYTHROCYTE [DISTWIDTH] IN BLOOD BY AUTOMATED COUNT: 3.87 M/UL (ref 3.8–5.4)
ERYTHROCYTE [DISTWIDTH] IN BLOOD: 17.8 % (ref 11.5–15.5)
GLUCOSE BLD-MCNC: 106 MG/DL (ref 75–99)
GLUCOSE BLD-MCNC: 108 MG/DL (ref 75–99)
GLUCOSE BLD-MCNC: 135 MG/DL (ref 75–99)
GLUCOSE BLD-MCNC: 147 MG/DL (ref 75–99)
GLUCOSE SERPL-MCNC: 95 MG/DL (ref 74–99)
HCT VFR BLD AUTO: 31.5 % (ref 34–46)
HGB BLD-MCNC: 9.5 GM/DL (ref 11.4–16)
LYMPHOCYTES # SPEC AUTO: 1 K/UL (ref 1–4.8)
LYMPHOCYTES NFR SPEC AUTO: 8 %
MCH RBC QN AUTO: 24.5 PG (ref 25–35)
MCHC RBC AUTO-ENTMCNC: 30 G/DL (ref 31–37)
MCV RBC AUTO: 81.6 FL (ref 80–100)
MONOCYTES # BLD AUTO: 1 K/UL (ref 0–1)
MONOCYTES NFR BLD AUTO: 8 %
NEUTROPHILS # BLD AUTO: 10.2 K/UL (ref 1.3–7.7)
NEUTROPHILS NFR BLD AUTO: 79 %
PLATELET # BLD AUTO: 146 K/UL (ref 150–450)
POTASSIUM SERPL-SCNC: 4.7 MMOL/L (ref 3.5–5.1)
SODIUM SERPL-SCNC: 139 MMOL/L (ref 137–145)
VANCOMYCIN SERPL-MCNC: 16.3 UG/ML
WBC # BLD AUTO: 12.9 K/UL (ref 3.8–10.6)

## 2020-04-29 RX ADMIN — Medication SCH UNIT: at 11:55

## 2020-04-29 RX ADMIN — METOPROLOL TARTRATE SCH MG: 25 TABLET, FILM COATED ORAL at 19:59

## 2020-04-29 RX ADMIN — SERTRALINE HYDROCHLORIDE SCH MG: 50 TABLET, FILM COATED ORAL at 11:56

## 2020-04-29 RX ADMIN — Medication SCH MG: at 11:56

## 2020-04-29 RX ADMIN — CYANOCOBALAMIN TAB 500 MCG SCH MCG: 500 TAB at 11:57

## 2020-04-29 RX ADMIN — PANTOPRAZOLE SODIUM SCH MG: 40 TABLET, DELAYED RELEASE ORAL at 15:45

## 2020-04-29 RX ADMIN — CEFEPIME HYDROCHLORIDE SCH MLS/HR: 2 INJECTION, POWDER, FOR SOLUTION INTRAVENOUS at 06:25

## 2020-04-29 RX ADMIN — GABAPENTIN SCH MG: 100 CAPSULE ORAL at 19:59

## 2020-04-29 RX ADMIN — HEPARIN SODIUM SCH MLS/HR: 10000 INJECTION, SOLUTION INTRAVENOUS at 17:17

## 2020-04-29 RX ADMIN — ASPIRIN 81 MG CHEWABLE TABLET SCH MG: 81 TABLET CHEWABLE at 11:55

## 2020-04-29 RX ADMIN — METOPROLOL TARTRATE SCH MG: 25 TABLET, FILM COATED ORAL at 11:56

## 2020-04-29 RX ADMIN — CEFEPIME HYDROCHLORIDE SCH: 2 INJECTION, POWDER, FOR SOLUTION INTRAVENOUS at 08:08

## 2020-04-29 RX ADMIN — FUROSEMIDE SCH: 40 TABLET ORAL at 15:52

## 2020-04-29 RX ADMIN — FLUDROCORTISONE ACETATE SCH MG: 0.1 TABLET ORAL at 19:59

## 2020-04-29 RX ADMIN — FLUDROCORTISONE ACETATE SCH MG: 0.1 TABLET ORAL at 11:55

## 2020-04-29 RX ADMIN — CEFAZOLIN SCH MLS/HR: 330 INJECTION, POWDER, FOR SOLUTION INTRAMUSCULAR; INTRAVENOUS at 06:24

## 2020-04-29 RX ADMIN — LACTULOSE SCH GM: 20 SOLUTION ORAL at 11:56

## 2020-04-29 NOTE — PN
PROGRESS NOTE



DATE OF SERVICE:

04/29/2020



This 79-year-old woman who was admitted with significant shortness of breath and

weakness also had pneumonia and COVID-19 was suspected, even though the testing 
was

negative.  The patient also has cirrhosis of the liver and abdominal distention.
The

most recent chest x-ray, which was done today and reviewed by me, showed 
evidence of

bilateral lesions.  The patient is on empiric antibiotics.  Abdominal ultrasound
was

also done which showed moderate abdominal ascites.  Cardiology has also seen the

patient for possible CHF and possible pneumonia.  Patient is being closely 
monitored as

well. The patient had an abnormal stress test earlier this year suggestive of 
coronary

artery disease. Past medical history reviewed. Review of systems could not be 
taken, as

the patient is confused.



CURRENT MEDICATIONS:

Reviewed. They include:

1. Tylenol p.r.n.

2. Norco 5 mg q.6 p.r.n.

3. Xanax 0.25 t.i.d.

4. Eliquis 2.5 mg b.i.d.

5. Aspirin 81 mg p.o. daily.

6. Cefepime 2 grams daily.

7. Vitamin D3.

8. Vitamin B12.

9. Florinef 0.2 b.i.d.

10.Lasix 40 mg b.i.d.

11.Neurontin.

12.Cephulac.

13.Magnesium oxide.

14.Lopressor.

15.Narcan.

16.Protonix.

17.Zoloft.



PHYSICAL EXAMINATION:

Patient is alert and oriented 3. Pulse 86, blood pressure 134/65, respiration 
18,

temperature 97.8, pulse ox 92% on room air.

HEENT: Conjunctivae normal.

NECK: No jugular venous distention.

CARDIOVASCULAR SYSTEM:  S1, S2 muffled.

RESPIRATORY SYSTEM: Breath sounds diminished at the bases.  A few scattered 
rhonchi and

crackles.

ABDOMEN: Soft, non-tender. Mild distention.

LEGS: Bilateral leg edema.

NERVOUS SYSTEM: Diffusely weak.

Examination of the face shows bilateral ecchymosis present.



LABS:

WBC 12.9, hemoglobin 9.5, and creatinine is 2.11.



ASSESSMENT:

1. Shortness of breath, possibly bilateral pneumonia, possibly community-
acquired,

    possible COVID-19-associated pneumonia.

2. Congestive heart failure, acute exacerbation, with acute on chronic diastolic

    dysfunction, ejection fraction 55% to 60%.

3. Suspected COVID-19. Test negative.

4. Acute on chronic renal failure with chronic kidney disease, stage III 
baseline as

    well as possible acute tubular necrosis.

5. Elevated lactic acid, possibly secondary to sepsis secondary to pneumonia.

6. Troponin 0.069, indeterminate.

7. Hypoalbuminemia.

8. Hyponatremia.

9. Change in mental status, metabolic encephalopathy, acute on chronic.

10.Generalized gait dysfunction.

11.Increased white count.

12.Anemia, normocytic.

13.History of multiple falls recently.

14.History of atrial fibrillation.

15.History of congestive heart failure.

16.History of diabetes mellitus, type 2.

17.Hypertension.

18.History of chronic liver disease and nonalcoholic cirrhosis of the liver and

    history of abdominal paracentesis multiple times.

19.History of urinary tract infection.

20.History of recurrent ascites and paracenteses.

21.History of thrombocytopenia.

22.History of left breast cancer with lumpectomy.

23.History of vancomycin-resistant Enterococcus.

24.History of depression.

25.History of cholecystectomy.

26.History of obesity with body mass index 32.6.

27.FULL CODE.



RECOMMENDATIONS AND DISCUSSION:

I recommend to continue current medications, continue with symptomatic 
treatment.

Otherwise, continue with the antibiotics. Continue the bronchodilators. Closely 
follow

with Cardiology and Pulmonology.  The patient is currently on cautious diuresis 
also.

I would also recommend PT/OT evaluation, possible ECF rehab.  I would also 
recommend a

CT scan of the head without IV contrast to complete the workup.  Otherwise, 
guarded

prognosis. Recommended Interventional Radiology to evaluate the patient for 
possible

abdominal paracentesis.  Further recommendations to follow.





MMODL / IJN: [id number] / Job#: [id number]

MTDD

## 2020-04-29 NOTE — XR
EXAMINATION TYPE: XR chest 1V portable

 

DATE OF EXAM: 4/29/2020

 

COMPARISON: 4/28/2020

 

INDICATION: CHF

 

TECHNIQUE: Single frontal view of the chest is obtained.

 

FINDINGS:  

The heart size is mildly prominent.  

The pulmonary vasculature is prominent.  

Mild diffuse increased opacities within the lungs. There is elevation of the right diaphragm. Some at
electasis may be along the minor fissure on the right. This appears improving from comparison.  

 

 

IMPRESSION:  

1. Findings be compatible with CHF in the proper clinical setting. Continued follow-up is recommended

## 2020-04-29 NOTE — US
EXAMINATION TYPE: US abdomen limited

 

DATE OF EXAM: 4/29/2020

 

COMPARISON: NONE

 

CLINICAL HISTORY: 79-year-old female Ascites check, exam done portable.

 

Technique: Multiple sonographic images of the 4 abdominal quadrants were obtained.

 

FINDINGS:

Moderate abdominal ascites particularly in the right side of the abdomen, Largest pocket of fluid in 
RLQ measuring 7 cm deep.

 

 

 

IMPRESSION:  

Moderate abdominal ascites especially in the right side of the abdomen.

## 2020-04-29 NOTE — P.CNPUL
History of Present Illness


Consult date: 04/29/20


Requesting physician: Mile Lundy


Reason for consult: pneumonia


Chief complaint: Shortness of breath and weakness


History of present illness: 





This is a 79-year-old female, familiar to my service, I have seen this patient 

before for ascites, congestive heart failure, liver cirrhosis, mental status 

changes related to hyperammonemia, related to underlying nonalcoholic liver 

cirrhosis.  Patient is also known to have history of diabetes, hypertension, 

seen few weeks ago for falls and weakness, and at the time she had significant 

ascites and significantly elevated ammonia level, underwent large-volume 

paracentesis, developed hypotension, treated mostly with volume replacement, and

she went on to develop fluid overload and diastolic congestive heart failure.  

Patient also had history of paroxysmal atrial fibrillation.  Patient was 

eventually discharged to [facility] [place] [facility], and apparently over the last few 

days the patient has been generally weak, complaining of shortness of breath, se

nt back to [facility], and her chest x-ray is showing bilateral

infiltrates.  It is not clear whether the findings are findings of pneumonia or 

findings of diastolic congestive heart failure.  Patient was placed on 

antibiotics empirically, her covid 19 screening was negative, however it is not 

entirely ruled out.  During my evaluation, patient was noted to be on room air, 

O2 saturation is 92% on room air.  She was noted to be in no form of distress.  

And she was already started empirically on antibiotics by the admitting 

physician.  Patient was also placed on heparin for paroxysmal atrial 

fibrillation.  On physical examination, patient was noted to have significant 

enlargement of her abdominal girth, and I recommended ultrasound of the abdomen 

patient may require repeat paracentesis.  In the meantime I recommended that we 

continue antibiotics, consider a trial of diuretics.  And I have ordered a serum

pro calcitonin, and I have noticed that her BNP level is significantly elevated.

 Her pro calcitonin is not suggestive of underlying bacterial pneumonia.  It is 

minimally elevated.  Patient is not the greatest historian.  Could not tell me 

exactly why she was sent to the hospital.





Review of Systems


Patient is not a great historian.


Constitutional: Reports fatigue, Reports weakness


Eyes: Negative.


Cardiovascular: Reports decreased exercise tolerance, Reports dyspnea on 

exertion, Denies chest pain, Denies shortness of breath


Respiratory: Slight dyspnea, no cough, no wheezing, no fever, no chills


Gastrointestinal: Increased abdominal girth.


Genitourinary: Denies any dysuria frequency or urgency.


Psychiatric: Denies any symptoms of active depression.  Or anxiety.


Endocrine: Denies any heat or cold intolerance.


Hematologic/Lymphatic: Denies any clotting bleeding or bruising.


Skin: No rashes.








Past Medical History


Past Medical History: Atrial Fibrillation, Cancer, Heart Failure, Diabetes Irene

itus, Hypertension, Liver Disease


Additional Past Medical History / Comment(s): Pt recently admitted to [facility] on 

3/11/20 with possible NSTEMI, paroxysmal Afib/RVR, hypovolemia 2ndary to large 

volume paracentesis, acute UTI, acute renal failure.     Other hx:   

Nonalcoholic cirrhosis-thought d/t rx, recurrent ascities/paracentesis, 

thrombocytopenia, portal htn, L breast cancer with lumpectomy, NIDDM type II, 

neuropathy bilateral legs/feet,


History of Any Multi-Drug Resistant Organisms: VRE


Date of last positivie culture/infection: 3/31/20


MDRO Source:: VRE URINE


Past Surgical History: Breast Surgery, Cholecystectomy


Additional Past Surgical History / Comment(s): L breast lumpectomy for cancer, 

right breast lump removal-benign, paracentesis-several,


Past Anesthesia/Blood Transfusion Reactions: No Reported Reaction


Past Psychological History: Depression


Additional Psychological History / Comment(s): Pt resides with her spouse. She 

states she is receiving home care but cannot recall name of company.  She has a 

walker/glucometer/scale and grab rail.


Smoking Status: Never smoker


Past Alcohol Use History: None Reported


Past Drug Use History: None Reported





- Past Family History


  ** Father


Family Medical History: Cancer


Additional Family Medical History / Comment(s): Colon cancer





  ** Brother(s)


Family Medical History: Myocardial Infarction (MI)


Additional Family Medical History / Comment(s): One brother with stents, another

brother passed away from an MI





  ** Mother


Family Medical History: Neurologic Disorder


Additional Family Medical History / Comment(s): Parkinsons





Medications and Allergies


                                Home Medications











 Medication  Instructions  Recorded  Confirmed  Type


 


Cholecalciferol (Vitamin D3) 2,000 unit PO DAILY 11/09/19 04/28/20 History





[Vitamin D3]    


 


Pioglitazone [Actos] 30 mg PO DAILY 11/09/19 04/28/20 History


 


Sertraline [Zoloft] 50 mg PO DAILY 11/09/19 04/28/20 History


 


Cyanocobalamin [Vitamin B-12] 1,000 mcg PO DAILY #60 tab 11/12/19 04/28/20 Rx


 


Niacin 2,000 mg PO DAILY 03/10/20 04/28/20 History


 


Acetaminophen Tab [Tylenol] 500 mg PO Q6HR PRN  tab 03/20/20 04/28/20 Rx


 


Aspirin 81 mg PO DAILY #30 chew 03/20/20 04/28/20 Rx


 


Fludrocortisone [Florinef] 0.2 mg PO BID #120 tab 03/20/20 04/28/20 Rx


 


Glimepiride [Amaryl] 2 mg PO BID #60 tab 03/20/20 04/28/20 Rx


 


Lactulose [Cephulac] 10 gm PO DAILY #450 ml 03/20/20 04/28/20 Rx


 


Magnesium Oxide [Mag-Ox] 400 mg PO DAILY #10 tab 03/20/20 04/28/20 Rx


 


Nitroglycerin Sl Tabs [Nitrostat] 0.4 mg SUBLINGUAL Q5M PRN #30 tab 03/20/20 04/28/20 Rx


 


Pantoprazole [Protonix] 40 mg PO AC-BRKFST #30 tablet.dr 03/20/20 04/28/20 Rx


 


Furosemide [Lasix] 40 mg PO BID 03/31/20 04/28/20 History


 


Spironolactone [Aldactone] 50 mg PO DAILY  tab 04/01/20 04/28/20 Rx


 


Gabapentin [Neurontin] 100 mg PO HS 04/28/20 04/28/20 History


 


Metoprolol Tartrate 12.5 mg PO BID 04/28/20 04/28/20 History


 


Sulfamethox-Tmp 200-40Mg/5Ml 20 ml PO Q12HR 04/28/20 04/28/20 History





[Bactrim Suspension]    








                                    Allergies











Allergy/AdvReac Type Severity Reaction Status Date / Time


 


No Known Allergies Allergy   Verified 03/31/20 11:14














Physical Exam


Vitals: 


                                   Vital Signs











  Temp Pulse Pulse Resp BP BP Pulse Ox


 


 04/29/20 11:45  97.8 F   86  18   134/65  92 L


 


 04/29/20 08:00  97.7 F   72  18   115/56  99


 


 04/29/20 04:00  97.7 F   77  16   128/58  99


 


 04/29/20 00:00    70  16   


 


 04/28/20 20:00  97.8 F   70  16   122/57  97


 


 04/28/20 19:20  97.7 F   74  20   112/58  96


 


 04/28/20 17:30   75   8 L  120/68   100


 


 04/28/20 15:59   80   18  118/63   95


 


 04/28/20 15:17   80     


 


 04/28/20 14:44  98.1 F  132 H   22  113/78   100








                                Intake and Output











 04/28/20 04/29/20 04/29/20





 22:59 06:59 14:59


 


Intake Total  69.146 120


 


Balance  69.146 120


 


Intake:   


 


  Intake, IV Titration  69.146 





  Amount   


 


    Heparin Sod,Pork in 0.45%  69.146 





    NaCl 25,000 unit In 0.45   





    % NaCl 1 250ml.bag @ 11.   





    02 UNITS/KG/HR 9.997 mls/   





    hr IV .Q24H [facility] Rx#:   





    [id number]   


 


  Oral   120


 


Other:   


 


  Voiding Method   Bedpan





   Diaper


 


  # Voids  1 


 


  Weight 103 kg 103 kg 














Physical Exam: Revealed 79-year-old female in no distress, not a great 

historian.


Head: Atraumatic, normocephalic.


HEENT:[Neck is supple.] [No neck masses.] [No thyromegaly.] [No JVD.]


Chest: [Diminished breath sounds at the bases no crackles or rhonchi or 

wheezes.]


Cardiac Exam: [Normal S1 and S2, no S3 gallop, no murmur.]


Abdomen: [Large abdomen, suspect ascites, no tenderness, abdominal wall edema is

 noted.]


Extremities: [No clubbing, 1+ bipedal edema, no cyanosis.]  Chronic venous s

tasis changes.


Neurological Exam: Confused, oriented 2.


Psychiatric: Depressed mood, blunt affect, poor mental status.


Skin: No rashes.  Chronic venous stasis changes noted in lower extremities.





Results





- Laboratory Findings


CBC and BMP: 


                                 04/29/20 05:25





                                 04/29/20 05:25


PT/INR, D-dimer











PT  13.3 sec (9.0-12.0)  H  04/28/20  14:52    


 


INR  1.3  (<1.2)  H  04/28/20  14:52    








Abnormal lab findings: 


                                  Abnormal Labs











  04/28/20 04/28/20 04/28/20





  14:52 14:52 14:52


 


WBC  12.6 H  


 


Hgb  10.0 L  


 


Hct  33.6 L  


 


MCH  24.5 L  


 


MCHC  29.9 L  


 


RDW  17.7 H  


 


Plt Count   


 


Neutrophils #  10.3 H  


 


PT   13.3 H 


 


INR   1.3 H 


 


APTT   


 


Sodium    136 L


 


BUN    83 H


 


Creatinine    2.19 H


 


Glucose    153 H


 


POC Glucose (mg/dL)   


 


Plasma Lactic Acid Joaquín   


 


Troponin I   


 


Total Protein    5.7 L


 


Albumin    2.8 L


 


Procalcitonin   














  04/28/20 04/28/20 04/28/20





  14:52 14:52 14:52


 


WBC   


 


Hgb   


 


Hct   


 


MCH   


 


MCHC   


 


RDW   


 


Plt Count   


 


Neutrophils #   


 


PT   


 


INR   


 


APTT   


 


Sodium   


 


BUN   


 


Creatinine   


 


Glucose   


 


POC Glucose (mg/dL)   


 


Plasma Lactic Acid Joaquín  2.8 H*  


 


Troponin I   0.069 H* 


 


Total Protein   


 


Albumin   


 


Procalcitonin    0.38 H














  04/28/20 04/28/20 04/29/20





  20:37 23:34 05:25


 


WBC   


 


Hgb   


 


Hct   


 


MCH   


 


MCHC   


 


RDW   


 


Plt Count   


 


Neutrophils #   


 


PT   


 


INR   


 


APTT   90.3 H 


 


Sodium   


 


BUN    84 H


 


Creatinine    2.11 H


 


Glucose   


 


POC Glucose (mg/dL)  158 H  


 


Plasma Lactic Acid Joaquín   


 


Troponin I   


 


Total Protein   


 


Albumin   


 


Procalcitonin   














  04/29/20 04/29/20 04/29/20





  05:25 05:55 06:46


 


WBC  12.9 H  


 


Hgb  9.5 L  


 


Hct  31.5 L  


 


MCH  24.5 L  


 


MCHC  30.0 L  


 


RDW  17.8 H  


 


Plt Count  146 L  


 


Neutrophils #  10.2 H  


 


PT   


 


INR   


 


APTT    70.4 H


 


Sodium   


 


BUN   


 


Creatinine   


 


Glucose   


 


POC Glucose (mg/dL)   108 H 


 


Plasma Lactic Acid Joaquín   


 


Troponin I   


 


Total Protein   


 


Albumin   


 


Procalcitonin   














  04/29/20





  11:28


 


WBC 


 


Hgb 


 


Hct 


 


MCH 


 


MCHC 


 


RDW 


 


Plt Count 


 


Neutrophils # 


 


PT 


 


INR 


 


APTT 


 


Sodium 


 


BUN 


 


Creatinine 


 


Glucose 


 


POC Glucose (mg/dL)  106 H


 


Plasma Lactic Acid Joaquín 


 


Troponin I 


 


Total Protein 


 


Albumin 


 


Procalcitonin 














- Diagnostic Findings


Chest x-ray: image reviewed (Chest x-ray showed small bilateral pleural 

effusions, cardiomegaly, bibasilar infiltrates, interstitial pattern type of e

[place], it is not clear whether this is pneumonia or congestive heart failure, 

clinically I believe the findings are more consistent with congestive heart 

failure.)





Assessment and Plan


Assessment: 





Impression:


Shortness of breath secondary to diastolic congestive heart failure, doubt 

bacterial pneumonia.  Pro calcitonin level is not significantly enlarged.  H

owever her BNP level is significantly high.


Bilateral infiltrates, negative covid 19, but I don't believe is entirely ruled 

out.


Acute on chronic renal failure


Suspect ongoing ascites, doubt spontaneous bacterial peritonitis, physical 

examination of the abdomen is rather benign and abdomen is nontender.


Suspect ongoing metabolic encephalopathy related mostly to her underlying 

nonalcoholic liver cirrhosis, doubt sepsis at this point.  Again that is not 

entirely ruled out.


Paroxysmal atrial fibrillation


History of chronic liver disease and nonalcoholic cirrhosis of the liver


History of recurrent ascites


History of vancomycin-resistant enterococcal infection


History of depression


Obesity with body index of 32.3.


History of recurrent urinary tract infections.





Recommendation:


Continue present supportive care measures


Arrange for ultrasound of the abdomen, May have to consider paracentesis again.


Gentle diuresis and assess chest x-ray findings on a daily basis.


Empiric antibiotics although clinically I strongly doubt bacterial pneumonia in 

this patient.


Suspected Covid  19, based on the chest x-ray findings but clinically the 

patient does not fit the picture.


We'll continue to follow.


Time with Patient: Greater than 30

## 2020-04-29 NOTE — P.CRDCN
History of Present Illness


Consult date: 04/29/20


Consult reason: congestive heart failure


Chief complaint: Shortness of breath


History of present illness: 


This is a 79-year-old  female with past medical history significant for

paroxysmal atrial fibrillation, on long-term anticoagulation, hypertension, 

hyperlipidemia, diabetes, nonalcoholic cirrhosis, coronary artery disease.  

Patient had an abnormal stress test in March of this year and at that time was 

recommended to undergo cardiac catheterization.  Unfortunately she became 

medically unstable and therefore the cardiac catheterization had not been 

performed.  She follows with Dr. Puga in the office.  Echocardiogram with 

Doppler study performed in March of this year revealed a normal left ventricular

systolic function.  Patient presents to the hospital on this occasion with 

symptoms of 2-3 day duration of progressively worsening shortness of breath.  

Her EKG on admission showed atrial fibrillation with a rapid ventricular 

response, heart rate 148, subsequent EKG showed normal sinus rhythm.  Patient 

remains in a normal sinus rhythm this morning.  Subsequent chest x-ray was also 

performed, which revealed congestive heart failure.  Her initial chest x-ray 

showed diffuse patchy infiltrates in the right lower lobe.  Temperature 97.7, 

blood pressure 128/58 with a heart rate in the 70s, 99% on 2 L of oxygen.  White

blood cell count 12.9, hemoglobin 10.0, platelet count 153.  Sodium 136, potass

ium 4.6, BUN 83, and creatinine 2.1 yesterday, 84 and 2.1 today.  Coronavirus 

testing and negative, pro calcitonin 0.38, BNP level 13,800, troponin 0.069.At 

the time of her examination by Dr. Escalante, patient was not in any acute 

distress, still complaining of some mild shortness of breath however.





Past Medical History


Past Medical History: Atrial Fibrillation, Cancer, Heart Failure, Diabetes 

Mellitus, Hypertension, Liver Disease


Additional Past Medical History / Comment(s): Pt recently admitted to [facility] on 

3/11/20 with possible NSTEMI, paroxysmal Afib/RVR, hypovolemia 2ndary to large 

volume paracentesis, acute UTI, acute renal failure.     Other hx:   

Nonalcoholic cirrhosis-thought d/t rx, recurrent ascities/paracentesis, thr

ombocytopenia, portal htn, L breast cancer with lumpectomy, NIDDM type II, 

neuropathy bilateral legs/feet,


History of Any Multi-Drug Resistant Organisms: VRE


Date of last positivie culture/infection: 3/31/20


MDRO Source:: VRE URINE


Past Surgical History: Breast Surgery, Cholecystectomy


Additional Past Surgical History / Comment(s): L breast lumpectomy for cancer, 

right breast lump removal-benign, paracentesis-several,


Past Anesthesia/Blood Transfusion Reactions: No Reported Reaction


Past Psychological History: Depression


Additional Psychological History / Comment(s): Pt resides with her spouse. She 

states she is receiving home care but cannot recall name of company.  She has a 

walker/glucometer/scale and grab rail.


Smoking Status: Never smoker


Past Alcohol Use History: None Reported


Past Drug Use History: None Reported





- Past Family History


  ** Father


Family Medical History: Cancer


Additional Family Medical History / Comment(s): Colon cancer





  ** Brother(s)


Family Medical History: Myocardial Infarction (MI)


Additional Family Medical History / Comment(s): One brother with stents, another

brother passed away from an MI





  ** Mother


Family Medical History: Neurologic Disorder


Additional Family Medical History / Comment(s): Parkinsons





Medications and Allergies


                                Home Medications











 Medication  Instructions  Recorded  Confirmed  Type


 


Cholecalciferol (Vitamin D3) 2,000 unit PO DAILY 11/09/19 04/28/20 History





[Vitamin D3]    


 


Pioglitazone [Actos] 30 mg PO DAILY 11/09/19 04/28/20 History


 


Sertraline [Zoloft] 50 mg PO DAILY 11/09/19 04/28/20 History


 


Cyanocobalamin [Vitamin B-12] 1,000 mcg PO DAILY #60 tab 11/12/19 04/28/20 Rx


 


Niacin 2,000 mg PO DAILY 03/10/20 04/28/20 History


 


Acetaminophen Tab [Tylenol] 500 mg PO Q6HR PRN  tab 03/20/20 04/28/20 Rx


 


Aspirin 81 mg PO DAILY #30 chew 03/20/20 04/28/20 Rx


 


Fludrocortisone [Florinef] 0.2 mg PO BID #120 tab 03/20/20 04/28/20 Rx


 


Glimepiride [Amaryl] 2 mg PO BID #60 tab 03/20/20 04/28/20 Rx


 


Lactulose [Cephulac] 10 gm PO DAILY #450 ml 03/20/20 04/28/20 Rx


 


Magnesium Oxide [Mag-Ox] 400 mg PO DAILY #10 tab 03/20/20 04/28/20 Rx


 


Nitroglycerin Sl Tabs [Nitrostat] 0.4 mg SUBLINGUAL Q5M PRN #30 tab 03/20/20 04/28/20 Rx


 


Pantoprazole [Protonix] 40 mg PO AC-BRKFST #30 tablet.dr 03/20/20 04/28/20 Rx


 


Furosemide [Lasix] 40 mg PO BID 03/31/20 04/28/20 History


 


Spironolactone [Aldactone] 50 mg PO DAILY  tab 04/01/20 04/28/20 Rx


 


Gabapentin [Neurontin] 100 mg PO HS 04/28/20 04/28/20 History


 


Metoprolol Tartrate 12.5 mg PO BID 04/28/20 04/28/20 History


 


Sulfamethox-Tmp 200-40Mg/5Ml 20 ml PO Q12HR 04/28/20 04/28/20 History





[Bactrim Suspension]    








                                    Allergies











Allergy/AdvReac Type Severity Reaction Status Date / Time


 


No Known Allergies Allergy   Verified 03/31/20 11:14














Physical Exam


Vitals: 


                                   Vital Signs











  Temp Pulse Pulse Resp BP BP Pulse Ox


 


 04/29/20 11:45  97.8 F   86  18   134/65  92 L


 


 04/29/20 08:00  97.7 F   72  18   115/56  99


 


 04/29/20 04:00  97.7 F   77  16   128/58  99


 


 04/29/20 00:00    70  16   


 


 04/28/20 20:00  97.8 F   70  16   122/57  97


 


 04/28/20 19:20  97.7 F   74  20   112/58  96


 


 04/28/20 17:30   75   8 L  120/68   100


 


 04/28/20 15:59   80   18  118/63   95


 


 04/28/20 15:17   80     


 


 04/28/20 14:44  98.1 F  132 H   22  113/78   100








                                Intake and Output











 04/28/20 04/29/20 04/29/20





 22:59 06:59 14:59


 


Intake Total  69.146 120


 


Balance  69.146 120


 


Intake:   


 


  Intake, IV Titration  69.146 





  Amount   


 


    Heparin Sod,Pork in 0.45%  69.146 





    NaCl 25,000 unit In 0.45   





    % NaCl 1 250ml.bag @ 11.   





    02 UNITS/KG/HR 9.997 mls/   





    hr IV .Q24H [facility] Rx#:   





    [id number]   


 


  Oral   120


 


Other:   


 


  Voiding Method   Bedpan





   Diaper


 


  # Voids  1 


 


  Weight 103 kg 103 kg 














Physical Exam: Revealed 79-year-old female in no distress, not a great 

historian.


Head: Atraumatic, normocephalic.


HEENT:Neck is supple.] [No neck masses.No thyromegaly.No JVD.


Chest: [Diminished breath sounds at the bases w crackles ,no rhonchi or wh

eezes.]


Cardiac Exam: [Normal S1 and S2, no S3 gallop, no murmur.]


Abdomen: [Large abdomen, suspect ascites, no tenderness, abdominal wall edema is

 noted.]


Extremities: [No clubbing, 1+ bipedal edema, no cyanosis.]  Chronic venous 

stasis changes.


Neurological Exam: Confused, oriented 2.


Psychiatric: Depressed mood, blunt affect, poor mental status.


Skin: No rashes.  Chronic venous stasis changes noted in lower extremities.








Results





                                 04/29/20 05:25





                                 04/29/20 05:25


                                 Cardiac Enzymes











  04/28/20 04/28/20 Range/Units





  14:52 14:52 


 


AST  33   (14-36)  U/L


 


Troponin I   0.069 H*  (0.000-0.034)  ng/mL








                                   Coagulation











  04/28/20 04/28/20 04/29/20 Range/Units





  14:52 23:34 06:46 


 


PT  13.3 H    (9.0-12.0)  sec


 


APTT  26.2  90.3 H  70.4 H  (22.0-30.0)  sec








                                       CBC











  04/28/20 04/29/20 Range/Units





  14:52 05:25 


 


WBC  12.6 H  12.9 H  (3.8-10.6)  k/uL


 


RBC  4.10  3.87  (3.80-5.40)  m/uL


 


Hgb  10.0 L  9.5 L  (11.4-16.0)  gm/dL


 


Hct  33.6 L  31.5 L  (34.0-46.0)  %


 


Plt Count  153  D  146 L  (150-450)  k/uL








                          Comprehensive Metabolic Panel











  04/28/20 04/29/20 Range/Units





  14:52 05:25 


 


Sodium  136 L  139  (137-145)  mmol/L


 


Potassium  4.6  4.7  (3.5-5.1)  mmol/L


 


Chloride  103  106  ()  mmol/L


 


Carbon Dioxide  24  25  (22-30)  mmol/L


 


BUN  83 H  84 H  (7-17)  mg/dL


 


Creatinine  2.19 H  2.11 H  (0.52-1.04)  mg/dL


 


Glucose  153 H  95  (74-99)  mg/dL


 


Calcium  9.0  8.9  (8.4-10.2)  mg/dL


 


AST  33   (14-36)  U/L


 


ALT  21   (4-34)  U/L


 


Alkaline Phosphatase  88   ()  U/L


 


Total Protein  5.7 L   (6.3-8.2)  g/dL


 


Albumin  2.8 L   (3.5-5.0)  g/dL








                               Current Medications











Generic Name Dose Route Start Last Admin





  Trade Name Freq  PRN Reason Stop Dose Admin


 


Acetaminophen  500 mg  04/28/20 17:05 





  Tylenol Tab  PO  





  Q6HR PRN  





  Fever and/ or Mild Pain  


 


Hydrocodone Bitart/Acetaminophen  1 each  04/28/20 17:05 





  Palmer 5-325  PO  





  Q6HR PRN  





  Moderate Pain  


 


Alprazolam  0.25 mg  04/28/20 17:05 





  Xanax  PO  





  TID PRN  





  Anxiety  


 


Aspirin  81 mg  04/29/20 09:00  04/29/20 11:55





  Aspirin  PO   81 mg





  DAILY KHUSHI   Administration


 


Cholecalciferol  2,000 unit  04/29/20 09:00  04/29/20 11:55





  Vitamin D3 (25 Mcg = 1000 Iu)  PO   2,000 unit





  DAILY KHUSHI   Administration


 


Cyanocobalamin  1,000 mcg  04/29/20 09:00  04/29/20 11:57





  Vitamin B-12  PO   1,000 mcg





  DAILY KHUSHI   Administration


 


Fludrocortisone Acetate  0.2 mg  04/29/20 09:00  04/29/20 11:55





  Florinef  PO   0.2 mg





  BID KHUSHI   Administration


 


Furosemide  40 mg  04/29/20 16:00 





  Lasix  PO  





  BID@0900,1600 KHUSHI  


 


Gabapentin  100 mg  04/29/20 21:00 





  Neurontin  PO  





  HS KHUSHI  


 


Heparin Sodium (Porcine)  0 unit  04/28/20 16:31 





  Heparin  IV  





  PER PROTOCOL PRN  





  Low PTT  





  Protocol  


 


Heparin Sodium/Sodium Chloride  250 mls @ 9.997 mls/hr  04/28/20 16:45  04/29/20

00:30





  25,000 unit/ Sodium Chloride  IV   8.82 units/kg/hr





  .Q24H KHUSHI   7.997 mls/hr





    Titration





  Protocol  





  11.02 UNITS/KG/HR  


 


Sodium Chloride  1,000 mls @ 50 mls/hr  04/28/20 16:45  04/29/20 06:24





  Saline 0.9%  IV   50 mls/hr





  .Q20H KHUSHI   Administration


 


Cefepime HCl 2 gm/ Sodium  100 mls @ 200 mls/hr  04/30/20 06:00 





  Chloride  IVPB  





  Q24H KHUSHI  


 


Lactulose  10 gm  04/29/20 09:00  04/29/20 11:56





  Cephulac  PO   10 gm





  DAILY KHUSHI   Administration


 


Magnesium Oxide  400 mg  04/29/20 09:00  04/29/20 11:56





  Mag-Ox  PO   400 mg





  DAILY KHUSHI   Administration


 


Metoprolol Tartrate  12.5 mg  04/29/20 09:00  04/29/20 11:56





  Lopressor  PO   12.5 mg





  BID KHUSHI   Administration


 


Miscellaneous Information  1 each  04/28/20 16:37 





  Pharmacy To Dose Iv Vancomycin  MISCELLANE  





  AS DIRECTED PRN  





  Per Protocol  





  Protocol  


 


Naloxone HCl  0.2 mg  04/28/20 16:41 





  Narcan  IV  





  Q2M PRN  





  Opioid Reversal  


 


Pantoprazole Sodium  40 mg  04/29/20 09:00 





  Protonix  PO  





  AC-BRKFST KHUSHI  


 


Sertraline HCl  50 mg  04/29/20 09:00  04/29/20 11:56





  Zoloft  PO   50 mg





  DAILY KHUSHI   Administration








                                Intake and Output











 04/28/20 04/29/20 04/29/20





 22:59 06:59 14:59


 


Intake Total  69.146 120


 


Balance  69.146 120


 


Intake:   


 


  Intake, IV Titration  69.146 





  Amount   


 


    Heparin Sod,Pork in 0.45%  69.146 





    NaCl 25,000 unit In 0.45   





    % NaCl 1 250ml.bag @ 11.   





    02 UNITS/KG/HR 9.997 mls/   





    hr IV .Q24H KHUSHI Rx#:   





    [id number]   


 


  Oral   120


 


Other:   


 


  Voiding Method   Bedpan





   Diaper


 


  # Voids  1 


 


  Weight 103 kg 103 kg 








                                        





                                 04/29/20 05:25 





                                 04/29/20 05:25 











EKG Interpretations (text)


Initial EKG shows atrial fibrillation with a rapid ventricular response, 

subsequent EKG shows normal sinus rhythm.








Assessment and Plan


Plan: 





Impression and plan:


#1 Shortness of breath secondary to diastolic congestive heart failure, acute on

chronic, possible pneumonia.  Pro calcitonin level is 0.38.   BNP level 13,800. 




Bilateral infiltrates, negative covid 19, although this cannot be completely 

ruled out.


#2 Acute on chronic renal failure


#3 Ongoing ascites


#4 Metabolic encephalopathy related mostly to her underlying nonalcoholic liver 

cirrhosis, possible sepsis 


 #5 Paroxysmal atrial fibrillation


 #6 History of chronic liver disease and nonalcoholic cirrhosis of the liver


 #7History of recurrent ascites


 #8 History of vancomycin-resistant enterococcal infection


 #9 History of depression


 #10Obesity with body index of 32.3.


 #11 History of recurrent urinary tract infections.


#12 diabetes 


 #13 hypertension 


 #14 hyperlipidemia 


 #15 abnormal stress test in March of this year, suggesting possible underlying 

coronary artery disease and plan








Plan 





Patient recently had an echo cardiogram with Doppler study performed in March of

this year which revealed a normal left ventricular systolic function.  We will 

give the patient a one-time dose of IV Lasix now, reevaluate on a daily basis.  

We will also start the patient on oral anticoagulation.  Continue baby aspirin. 

Further recommendations to follow.





DNP note has been reviewed, I agree with a documented findings and plan of care.

 Patient was seen and examined.

## 2020-04-30 LAB
ANION GAP SERPL CALC-SCNC: 9 MMOL/L
BASOPHILS # BLD AUTO: 0 K/UL (ref 0–0.2)
BASOPHILS NFR BLD AUTO: 0 %
BUN SERPL-SCNC: 81 MG/DL (ref 7–17)
CALCIUM SPEC-MCNC: 8.8 MG/DL (ref 8.4–10.2)
CELL CNT PNL FLD: 100
CHLORIDE SERPL-SCNC: 103 MMOL/L (ref 98–107)
CO2 SERPL-SCNC: 26 MMOL/L (ref 22–30)
DEPRECATED POLYS # FLD: 10 %
EOSINOPHIL # BLD AUTO: 0.2 K/UL (ref 0–0.7)
EOSINOPHIL NFR BLD AUTO: 2 %
ERYTHROCYTE [DISTWIDTH] IN BLOOD BY AUTOMATED COUNT: 3.66 M/UL (ref 3.8–5.4)
ERYTHROCYTE [DISTWIDTH] IN BLOOD: 17.8 % (ref 11.5–15.5)
GLUCOSE BLD-MCNC: 104 MG/DL (ref 75–99)
GLUCOSE BLD-MCNC: 92 MG/DL (ref 75–99)
GLUCOSE BLD-MCNC: 99 MG/DL (ref 75–99)
GLUCOSE FLD-MCNC: 108 MG/DL
GLUCOSE SERPL-MCNC: 112 MG/DL (ref 74–99)
HCT VFR BLD AUTO: 30.6 % (ref 34–46)
HGB BLD-MCNC: 9.2 GM/DL (ref 11.4–16)
HYALINE CASTS UR QL AUTO: 3 /LPF (ref 0–2)
LYMPHOCYTES # SPEC AUTO: 1.1 K/UL (ref 1–4.8)
LYMPHOCYTES NFR SPEC AUTO: 10 %
MCH RBC QN AUTO: 25.2 PG (ref 25–35)
MCHC RBC AUTO-ENTMCNC: 30.1 G/DL (ref 31–37)
MCV RBC AUTO: 83.7 FL (ref 80–100)
MONOCYTES # BLD AUTO: 0.7 K/UL (ref 0–1)
MONOCYTES NFR BLD AUTO: 7 %
MONONUC CELLS # FLD: 90 %
NEUTROPHILS # BLD AUTO: 9.1 K/UL (ref 1.3–7.7)
NEUTROPHILS NFR BLD AUTO: 80 %
NUC CELL # FLD: 178 /UL
PH UR: 5 [PH] (ref 5–8)
PLATELET # BLD AUTO: 132 K/UL (ref 150–450)
POTASSIUM SERPL-SCNC: 4.6 MMOL/L (ref 3.5–5.1)
RBC UR QL: 1 /HPF (ref 0–5)
SODIUM SERPL-SCNC: 138 MMOL/L (ref 137–145)
SP GR UR: 1.01 (ref 1–1.03)
SQUAMOUS UR QL AUTO: <1 /HPF (ref 0–4)
UROBILINOGEN UR QL STRIP: <2 MG/DL (ref ?–2)
VANCOMYCIN SERPL-MCNC: 17.6 UG/ML
WBC # BLD AUTO: 11.4 K/UL (ref 3.8–10.6)
WBC #/AREA URNS HPF: <1 /HPF (ref 0–5)

## 2020-04-30 RX ADMIN — FLUDROCORTISONE ACETATE SCH: 0.1 TABLET ORAL at 15:07

## 2020-04-30 RX ADMIN — GABAPENTIN SCH MG: 100 CAPSULE ORAL at 20:26

## 2020-04-30 RX ADMIN — ASPIRIN 81 MG CHEWABLE TABLET SCH MG: 81 TABLET CHEWABLE at 17:03

## 2020-04-30 RX ADMIN — FLUDROCORTISONE ACETATE SCH MG: 0.1 TABLET ORAL at 20:26

## 2020-04-30 RX ADMIN — CEFAZOLIN SCH MLS/HR: 330 INJECTION, POWDER, FOR SOLUTION INTRAMUSCULAR; INTRAVENOUS at 06:46

## 2020-04-30 RX ADMIN — CYANOCOBALAMIN TAB 500 MCG SCH: 500 TAB at 15:07

## 2020-04-30 RX ADMIN — FUROSEMIDE SCH MG: 40 TABLET ORAL at 17:04

## 2020-04-30 RX ADMIN — METOPROLOL TARTRATE SCH MG: 25 TABLET, FILM COATED ORAL at 20:27

## 2020-04-30 RX ADMIN — CEFEPIME HYDROCHLORIDE SCH MLS/HR: 2 INJECTION, POWDER, FOR SOLUTION INTRAVENOUS at 06:46

## 2020-04-30 RX ADMIN — Medication SCH MG: at 17:03

## 2020-04-30 RX ADMIN — HEPARIN SODIUM SCH: 10000 INJECTION, SOLUTION INTRAVENOUS at 19:26

## 2020-04-30 RX ADMIN — METOPROLOL TARTRATE SCH MG: 25 TABLET, FILM COATED ORAL at 17:03

## 2020-04-30 RX ADMIN — FUROSEMIDE SCH: 40 TABLET ORAL at 15:07

## 2020-04-30 RX ADMIN — LACTULOSE SCH GM: 20 SOLUTION ORAL at 17:04

## 2020-04-30 RX ADMIN — Medication SCH: at 15:08

## 2020-04-30 RX ADMIN — PANTOPRAZOLE SODIUM SCH MG: 40 TABLET, DELAYED RELEASE ORAL at 06:38

## 2020-04-30 RX ADMIN — Medication SCH: at 15:07

## 2020-04-30 RX ADMIN — SERTRALINE HYDROCHLORIDE SCH MG: 50 TABLET, FILM COATED ORAL at 17:03

## 2020-04-30 RX ADMIN — CEFAZOLIN SCH MLS/HR: 330 INJECTION, POWDER, FOR SOLUTION INTRAMUSCULAR; INTRAVENOUS at 20:27

## 2020-04-30 RX ADMIN — APIXABAN SCH MG: 2.5 TABLET, FILM COATED ORAL at 20:27

## 2020-04-30 NOTE — P.CONS
History of Present Illness





- Reason for Consult


Consult date: 04/29/20


pneumonia


Requesting physician: Mile Lundy





- Chief Complaint


shortness of breath x few days





- History of Present Illness


Patient is a 79-year-old  female with a past medical history for atrial

fibrillation congestive heart failure diabetes presenting to the ER at [facility] yesterday with chief complaints of increasing shortness of breath

patient symptom has been going on for few days before she presented to hospital 

patient denies having any chest pain he did have very minimal cough no sputum 

denies having any URI symptoms no nausea no vomiting no abdominal pain and no 

diarrhea on arrival to the ER patient has been afebrile patient did have my 

evaluation of 12,000 repeat is 12.9 urine is negative corona PCR was negative 

patient did have a chest x-ray which did shows diffuse patchy infiltrate in the 

periphery right lower lobe and left perihilar region correlate for atypical 

pneumonia patient has been started on cefepime and vancomycin infectious was 

consulted for further recommendation about antibiotic therapy patient herself is

not a very good historian so most information has been extracted from review of 

the chart.








Review of Systems


Positive point has been mentioned in HPI rest of the systems are negative











Past Medical History


Past Medical History: Atrial Fibrillation, Cancer, Heart Failure, Diabetes 

Mellitus, Hypertension, Liver Disease


Additional Past Medical History / Comment(s): Pt recently admitted to [facility] on 

3/11/20 with possible NSTEMI, paroxysmal Afib/RVR, hypovolemia 2ndary to large 

volume paracentesis, acute UTI, acute renal failure.     Other hx:   

Nonalcoholic cirrhosis-thought d/t rx, recurrent ascities/paracentesis, 

thrombocytopenia, portal htn, L breast cancer with lumpectomy, NIDDM type II, 

neuropathy bilateral legs/feet,


History of Any Multi-Drug Resistant Organisms: VRE


Year Discovered:: 3/31/20


MDRO Source:: VRE URINE


Past Surgical History: Breast Surgery, Cholecystectomy


Additional Past Surgical History / Comment(s): L breast lumpectomy for cancer, 

right breast lump removal-benign, paracentesis-several,


Past Anesthesia/Blood Transfusion Reactions: No Reported Reaction


Past Psychological History: Depression


Additional Psychological History / Comment(s): Pt resides with her spouse. She 

states she is receiving home care but cannot recall name of company.  She has a 

walker/glucometer/scale and grab rail.


Smoking Status: Never smoker


Past Alcohol Use History: None Reported


Past Drug Use History: None Reported





- Past Family History


  ** Father


Family Medical History: Cancer


Additional Family Medical History / Comment(s): Colon cancer





  ** Brother(s)


Family Medical History: Myocardial Infarction (MI)


Additional Family Medical History / Comment(s): One brother with stents, another

brother passed away from an MI





  ** Mother


Family Medical History: Neurologic Disorder


Additional Family Medical History / Comment(s): Parkinsons





Medications and Allergies


                                Home Medications











 Medication  Instructions  Recorded  Confirmed  Type


 


Cholecalciferol (Vitamin D3) 2,000 unit PO DAILY 11/09/19 04/28/20 History





[Vitamin D3]    


 


Pioglitazone [Actos] 30 mg PO DAILY 11/09/19 04/28/20 History


 


Sertraline [Zoloft] 50 mg PO DAILY 11/09/19 04/28/20 History


 


Cyanocobalamin [Vitamin B-12] 1,000 mcg PO DAILY #60 tab 11/12/19 04/28/20 Rx


 


Niacin 2,000 mg PO DAILY 03/10/20 04/28/20 History


 


Acetaminophen Tab [Tylenol] 500 mg PO Q6HR PRN  tab 03/20/20 04/28/20 Rx


 


Aspirin 81 mg PO DAILY #30 chew 03/20/20 04/28/20 Rx


 


Fludrocortisone [Florinef] 0.2 mg PO BID #120 tab 03/20/20 04/28/20 Rx


 


Glimepiride [Amaryl] 2 mg PO BID #60 tab 03/20/20 04/28/20 Rx


 


Lactulose [Cephulac] 10 gm PO DAILY #450 ml 03/20/20 04/28/20 Rx


 


Magnesium Oxide [Mag-Ox] 400 mg PO DAILY #10 tab 03/20/20 04/28/20 Rx


 


Nitroglycerin Sl Tabs [Nitrostat] 0.4 mg SUBLINGUAL Q5M PRN #30 tab 03/20/20 04/28/20 Rx


 


Pantoprazole [Protonix] 40 mg PO AC-BRKFST #30 tablet.dr 03/20/20 04/28/20 Rx


 


Furosemide [Lasix] 40 mg PO BID 03/31/20 04/28/20 History


 


Spironolactone [Aldactone] 50 mg PO DAILY  tab 04/01/20 04/28/20 Rx


 


Gabapentin [Neurontin] 100 mg PO HS 04/28/20 04/28/20 History


 


Metoprolol Tartrate 12.5 mg PO BID 04/28/20 04/28/20 History


 


Sulfamethox-Tmp 200-40Mg/5Ml 20 ml PO Q12HR 04/28/20 04/28/20 History





[Bactrim Suspension]    








                                    Allergies











Allergy/AdvReac Type Severity Reaction Status Date / Time


 


No Known Allergies Allergy   Verified 03/31/20 11:14














Physical Exam


Vitals: 


                                   Vital Signs











  Temp Pulse Pulse Resp BP BP Pulse Ox


 


 04/29/20 11:45  97.8 F   86  18   134/65  92 L


 


 04/29/20 08:00  97.7 F   72  18   115/56  99


 


 04/29/20 04:00  97.7 F   77  16   128/58  99


 


 04/29/20 00:00    70  16   


 


 04/28/20 20:00  97.8 F   70  16   122/57  97


 


 04/28/20 19:20  97.7 F   74  20   112/58  96


 


 04/28/20 17:30   75   8 L  120/68   100


 


 04/28/20 15:59   80   18  118/63   95


 


 04/28/20 15:17   80     








                                Intake and Output











 04/29/20 04/29/20 04/29/20





 06:59 14:59 22:59


 


Intake Total 69.146 480 


 


Balance 69.146 480 


 


Intake:   


 


  Intake, IV Titration 69.146  





  Amount   


 


    Heparin Sod,Pork in 0.45% 69.146  





    NaCl 25,000 unit In 0.45   





    % NaCl 1 250ml.bag @ 11.   





    02 UNITS/KG/HR 9.997 mls/   





    hr IV .Q24H [facility] Rx#:   





    [id number]   


 


  Oral  480 


 


Other:   


 


  Voiding Method  Bedpan 





  Diaper 


 


  # Voids 1  


 


  Weight 103 kg  











GENERAL DESCRIPTION: Elderly female lying in bed, no distress. No tachypnea or 

accessory muscle of respiration use.


HEENT: Shows Pallor , no scleral icterus. Oral mucous membrane is dry.


NECK: Trachea central, no thyromegaly.


LUNGS: Unlabored breathing.  Decreased breath sounds at bases. No wheeze or 

crackle.


HEART: S1, S2, regular rate and rhythm.


ABDOMEN: Soft, no tenderness , guarding or rigidity


EXTREMITIES: No edema of feet.


SKIN: No rash, no masses palpable.


NEUROLOGICAL: The patient is awake, alert, oriented x2, mood and affect normal.








Results


CBC & Chem 7: 


                                 04/30/20 02:19





                                 04/30/20 02:19


Labs: 


                  Abnormal Lab Results - Last 24 Hours (Table)











  04/28/20 04/28/20 04/28/20 Range/Units





  14:52 14:52 14:52 


 


WBC  12.6 H    (3.8-10.6)  k/uL


 


Hgb  10.0 L    (11.4-16.0)  gm/dL


 


Hct  33.6 L    (34.0-46.0)  %


 


MCH  24.5 L    (25.0-35.0)  pg


 


MCHC  29.9 L    (31.0-37.0)  g/dL


 


RDW  17.7 H    (11.5-15.5)  %


 


Plt Count     (150-450)  k/uL


 


Neutrophils #  10.3 H    (1.3-7.7)  k/uL


 


PT   13.3 H   (9.0-12.0)  sec


 


INR   1.3 H   (<1.2)  


 


APTT     (22.0-30.0)  sec


 


Sodium    136 L  (137-145)  mmol/L


 


BUN    83 H  (7-17)  mg/dL


 


Creatinine    2.19 H  (0.52-1.04)  mg/dL


 


Glucose    153 H  (74-99)  mg/dL


 


POC Glucose (mg/dL)     (75-99)  mg/dL


 


Plasma Lactic Acid Joaquín     (0.7-2.0)  mmol/L


 


Troponin I     (0.000-0.034)  ng/mL


 


Total Protein    5.7 L  (6.3-8.2)  g/dL


 


Albumin    2.8 L  (3.5-5.0)  g/dL


 


Procalcitonin     (0.02-0.09)  ng/mL














  04/28/20 04/28/20 04/28/20 Range/Units





  14:52 14:52 14:52 


 


WBC     (3.8-10.6)  k/uL


 


Hgb     (11.4-16.0)  gm/dL


 


Hct     (34.0-46.0)  %


 


MCH     (25.0-35.0)  pg


 


MCHC     (31.0-37.0)  g/dL


 


RDW     (11.5-15.5)  %


 


Plt Count     (150-450)  k/uL


 


Neutrophils #     (1.3-7.7)  k/uL


 


PT     (9.0-12.0)  sec


 


INR     (<1.2)  


 


APTT     (22.0-30.0)  sec


 


Sodium     (137-145)  mmol/L


 


BUN     (7-17)  mg/dL


 


Creatinine     (0.52-1.04)  mg/dL


 


Glucose     (74-99)  mg/dL


 


POC Glucose (mg/dL)     (75-99)  mg/dL


 


Plasma Lactic Acid Joaquín  2.8 H*    (0.7-2.0)  mmol/L


 


Troponin I   0.069 H*   (0.000-0.034)  ng/mL


 


Total Protein     (6.3-8.2)  g/dL


 


Albumin     (3.5-5.0)  g/dL


 


Procalcitonin    0.38 H  (0.02-0.09)  ng/mL














  04/28/20 04/28/20 04/29/20 Range/Units





  20:37 23:34 05:25 


 


WBC     (3.8-10.6)  k/uL


 


Hgb     (11.4-16.0)  gm/dL


 


Hct     (34.0-46.0)  %


 


MCH     (25.0-35.0)  pg


 


MCHC     (31.0-37.0)  g/dL


 


RDW     (11.5-15.5)  %


 


Plt Count     (150-450)  k/uL


 


Neutrophils #     (1.3-7.7)  k/uL


 


PT     (9.0-12.0)  sec


 


INR     (<1.2)  


 


APTT   90.3 H   (22.0-30.0)  sec


 


Sodium     (137-145)  mmol/L


 


BUN    84 H  (7-17)  mg/dL


 


Creatinine    2.11 H  (0.52-1.04)  mg/dL


 


Glucose     (74-99)  mg/dL


 


POC Glucose (mg/dL)  158 H    (75-99)  mg/dL


 


Plasma Lactic Acid Joaquín     (0.7-2.0)  mmol/L


 


Troponin I     (0.000-0.034)  ng/mL


 


Total Protein     (6.3-8.2)  g/dL


 


Albumin     (3.5-5.0)  g/dL


 


Procalcitonin     (0.02-0.09)  ng/mL














  04/29/20 04/29/20 04/29/20 Range/Units





  05:25 05:55 06:46 


 


WBC  12.9 H    (3.8-10.6)  k/uL


 


Hgb  9.5 L    (11.4-16.0)  gm/dL


 


Hct  31.5 L    (34.0-46.0)  %


 


MCH  24.5 L    (25.0-35.0)  pg


 


MCHC  30.0 L    (31.0-37.0)  g/dL


 


RDW  17.8 H    (11.5-15.5)  %


 


Plt Count  146 L    (150-450)  k/uL


 


Neutrophils #  10.2 H    (1.3-7.7)  k/uL


 


PT     (9.0-12.0)  sec


 


INR     (<1.2)  


 


APTT    70.4 H  (22.0-30.0)  sec


 


Sodium     (137-145)  mmol/L


 


BUN     (7-17)  mg/dL


 


Creatinine     (0.52-1.04)  mg/dL


 


Glucose     (74-99)  mg/dL


 


POC Glucose (mg/dL)   108 H   (75-99)  mg/dL


 


Plasma Lactic Acid Joaquín     (0.7-2.0)  mmol/L


 


Troponin I     (0.000-0.034)  ng/mL


 


Total Protein     (6.3-8.2)  g/dL


 


Albumin     (3.5-5.0)  g/dL


 


Procalcitonin     (0.02-0.09)  ng/mL














  04/29/20 Range/Units





  11:28 


 


WBC   (3.8-10.6)  k/uL


 


Hgb   (11.4-16.0)  gm/dL


 


Hct   (34.0-46.0)  %


 


MCH   (25.0-35.0)  pg


 


MCHC   (31.0-37.0)  g/dL


 


RDW   (11.5-15.5)  %


 


Plt Count   (150-450)  k/uL


 


Neutrophils #   (1.3-7.7)  k/uL


 


PT   (9.0-12.0)  sec


 


INR   (<1.2)  


 


APTT   (22.0-30.0)  sec


 


Sodium   (137-145)  mmol/L


 


BUN   (7-17)  mg/dL


 


Creatinine   (0.52-1.04)  mg/dL


 


Glucose   (74-99)  mg/dL


 


POC Glucose (mg/dL)  106 H  (75-99)  mg/dL


 


Plasma Lactic Acid Joaquín   (0.7-2.0)  mmol/L


 


Troponin I   (0.000-0.034)  ng/mL


 


Total Protein   (6.3-8.2)  g/dL


 


Albumin   (3.5-5.0)  g/dL


 


Procalcitonin   (0.02-0.09)  ng/mL














Assessment and Plan


Assessment: 


Patient presented to the hospital with increasing shortness of breath of few 

days duration in this patient currently do not have any fever mildly elevated 

white count, with abnormal chest x-ray finding question of fluid related 

pneumonia less likely but not entirely excluded in view of the patient in and 

out of the hospital agree with covering for resistant gram-positive and gram-neg

ative while waiting for the culture to finalize








(1) Pneumonia


Current Visit: Yes   Status: Acute   Code(s): J18.9 - PNEUMONIA, UNSPECIFIED 

ORGANISM   SNOMED Code(s): 717241208


   


Plan: 


1-we will obtain a sputum for Gram stain and culture


2-vancomycin pharmacy to dose her with a target trough of 15 while watching her 

kidney function and Vanco trough closely.  And cefepime to continue for now.


3- check procalcitoin level


We will follow on clinical condition and cultures to further adjust medication 

if needed


Thank you for this consultation we will follow the patient along with you








Time with Patient: Greater than 30

## 2020-04-30 NOTE — CT
EXAMINATION TYPE: CT brain wo con

 

DATE OF EXAM: 4/30/2020

 

HISTORY: Change in mental status.

 

CT DLP: 1057.2 mGycm.  Automated Exposure Control for Dose Reduction was Utilized.

 

TECHNIQUE: CT scan of the head is performed without contrast.

 

COMPARISON: CT brain March 31, 2020.

 

FINDINGS:   There is no acute intracranial hemorrhage or midline shift identified. There is diffuse v
entricular and sulcal prominence consistent with diffuse age-related cerebral atrophy.  There is low-
attenuation in the periventricular white matter consistent with chronic small vessel ischemic change.
 Mild to moderate mucosal thickening in the right maxillary and minimal mucosal thickening in left ma
xillary sinus noted on current study.

 

IMPRESSION:  No acute intracranial hemorrhage or midline shift.  There is mild to moderate diffuse ce
rebral atrophy and chronic small vessel ischemic change redemonstrated.  No significant change from m
ost recent prior CT.

## 2020-04-30 NOTE — P.PN
Subjective


Progress Note Date: 04/30/20





This is a 79-year-old  female with past medical history significant for

paroxysmal atrial fibrillation, on long-term anticoagulation, hypertension, 

hyperlipidemia, diabetes, nonalcoholic cirrhosis, coronary artery disease.  

Patient had an abnormal stress test in March of this year and at that time was 

recommended to undergo cardiac catheterization.  Unfortunately she became 

medically unstable and therefore the cardiac catheterization had not been 

performed.  She follows with Dr. Puga in the office.  Echocardiogram with 

Doppler study performed in March of this year revealed a normal left ventricular

systolic function.  Patient presents to the hospital on this occasion with 

symptoms of 2-3 day duration of progressively worsening shortness of breath.  

Her EKG on admission showed atrial fibrillation with a rapid ventricular 

response, heart rate 148, subsequent EKG showed normal sinus rhythm.  Patient 

remains in a normal sinus rhythm this morning.  Subsequent chest x-ray was also 

performed, which revealed congestive heart failure.  Her initial chest x-ray 

showed diffuse patchy infiltrates in the right lower lobe.  Temperature 97.7, 

blood pressure 128/58 with a heart rate in the 70s, 99% on 2 L of oxygen.  White

blood cell count 12.9, hemoglobin 10.0, platelet count 153.  Sodium 136, 

potassium 4.6, BUN 83, and creatinine 2.1 yesterday, 84 and 2.1 today.  

Coronavirus testing and negative, pro calcitonin 0.38, BNP level 13,800, 

troponin 0.069.At the time of her examination by Dr. Escalante, patient was not in

any acute distress, still complaining of some mild shortness of breath however.





04/30/2020


Patient was seen and examined this morning, she does feel as though her breathi

ng is improving, her weight is down 3 kg today.  Blood pressure 126/40 with a 

heart rate in the 70s, 95% on 3 L of oxygen.  White blood cell count 11.4, 

hemoglobin 9.2, platelet count 132.  Sodium 138, potassium 4.6, BUN 81, 

creatinine 2.0.  Patient is currently on oral diuretics.  We will give her one 

time dose of additional 40 mg IV Lasix today.  Check her labs in the morning.








Objective





- Vital Signs


Vital signs: 


                                   Vital Signs











Temp  98.3 F   04/30/20 08:35


 


Pulse  76   04/30/20 08:35


 


Resp  16   04/30/20 08:35


 


BP  127/44   04/30/20 08:35


 


Pulse Ox  95   04/30/20 08:35








                                 Intake & Output











 04/29/20 04/30/20 04/30/20





 18:59 06:59 18:59


 


Intake Total 720 73.706 29.072


 


Balance 720 73.706 29.072


 


Weight  100 kg 


 


Intake:   


 


  Intake, IV Titration  73.706 29.072





  Amount   


 


    Heparin Sod,Pork in 0.45%  73.706 29.072





    NaCl 25,000 unit In 0.45   





    % NaCl 1 250ml.bag @ 9   





    UNITS/KG/HR 9.27 mls/hr   





    IV .Q24H [facility] Rx#:   





    [id number]   


 


  Oral 720  


 


Other:   


 


  Voiding Method Bedpan Bedpan Bedpan





 Diaper Diaper Diaper


 


  # Voids 2 22 














- Exam





Physical Exam: Revealed 79-year-old female in no distress, not a great 

historian.


Head: Atraumatic, normocephalic.


HEENT:Neck is supple.] [No neck masses.No thyromegaly.No JVD.


Chest: [Diminished breath sounds at the bases w crackles ,no rhonchi or 

wheezes.]


Cardiac Exam: [Normal S1 and S2, no S3 gallop, no murmur.]


Abdomen: [Large abdomen,  ascites, no tenderness, abdominal wall edema is 

noted.]


Extremities: [No clubbing, 1+ bipedal edema, no cyanosis.]  Chronic venous 

stasis changes.


Neurological Exam: Confused, oriented 2.


Psychiatric: Depressed mood, blunt affect, poor mental status.


Skin: No rashes.  Chronic venous stasis changes noted in lower extremities.








- Labs


CBC & Chem 7: 


                                 04/30/20 02:19





                                 04/30/20 02:19


Labs: 


                  Abnormal Lab Results - Last 24 Hours (Table)











  04/29/20 04/29/20 04/29/20 Range/Units





  11:28 16:45 20:21 


 


WBC     (3.8-10.6)  k/uL


 


RBC     (3.80-5.40)  m/uL


 


Hgb     (11.4-16.0)  gm/dL


 


Hct     (34.0-46.0)  %


 


MCHC     (31.0-37.0)  g/dL


 


RDW     (11.5-15.5)  %


 


Plt Count     (150-450)  k/uL


 


Neutrophils #     (1.3-7.7)  k/uL


 


APTT     (22.0-30.0)  sec


 


BUN     (7-17)  mg/dL


 


Creatinine     (0.52-1.04)  mg/dL


 


Glucose     (74-99)  mg/dL


 


POC Glucose (mg/dL)  106 H  147 H  135 H  (75-99)  mg/dL














  04/30/20 04/30/20 04/30/20 Range/Units





  02:19 02:19 02:19 


 


WBC   11.4 H   (3.8-10.6)  k/uL


 


RBC   3.66 L   (3.80-5.40)  m/uL


 


Hgb   9.2 L   (11.4-16.0)  gm/dL


 


Hct   30.6 L   (34.0-46.0)  %


 


MCHC   30.1 L   (31.0-37.0)  g/dL


 


RDW   17.8 H   (11.5-15.5)  %


 


Plt Count   132 L   (150-450)  k/uL


 


Neutrophils #   9.1 H   (1.3-7.7)  k/uL


 


APTT    73.2 H  (22.0-30.0)  sec


 


BUN  81 H    (7-17)  mg/dL


 


Creatinine  2.08 H    (0.52-1.04)  mg/dL


 


Glucose  112 H    (74-99)  mg/dL


 


POC Glucose (mg/dL)     (75-99)  mg/dL














  04/30/20 Range/Units





  05:55 


 


WBC   (3.8-10.6)  k/uL


 


RBC   (3.80-5.40)  m/uL


 


Hgb   (11.4-16.0)  gm/dL


 


Hct   (34.0-46.0)  %


 


MCHC   (31.0-37.0)  g/dL


 


RDW   (11.5-15.5)  %


 


Plt Count   (150-450)  k/uL


 


Neutrophils #   (1.3-7.7)  k/uL


 


APTT   (22.0-30.0)  sec


 


BUN   (7-17)  mg/dL


 


Creatinine   (0.52-1.04)  mg/dL


 


Glucose   (74-99)  mg/dL


 


POC Glucose (mg/dL)  104 H  (75-99)  mg/dL








                      Microbiology - Last 24 Hours (Table)











 04/28/20 19:10 Blood Culture - Preliminary





 Blood    No Growth after 24 hours














Assessment and Plan


Plan: 





Impression and plan:


#1 Shortness of breath secondary to diastolic congestive heart failure, acute on

chronic, possible pneumonia.  Pro calcitonin level is 0.38.   BNP level 13,800. 




Bilateral infiltrates, negative covid 19, although this cannot be completely 

ruled out.


#2 Acute on chronic renal failure


#3 Ongoing ascites


#4 Metabolic encephalopathy related mostly to her underlying nonalcoholic liver 

cirrhosis, possible sepsis 


 #5 Paroxysmal atrial fibrillation


 #6 History of chronic liver disease and nonalcoholic cirrhosis of the liver


 #7History of recurrent ascites


 #8 History of vancomycin-resistant enterococcal infection


 #9 History of depression


 #10Obesity with body index of 32.3.


 #11 History of recurrent urinary tract infections.


#12 diabetes 


 #13 hypertension 


 #14 hyperlipidemia 


 #15 abnormal stress test in March of this year, suggesting possible underlying 

coronary artery disease and plan








Plan 





Patient recently had an echo cardiogram with Doppler study performed in March of

this year which revealed a normal left ventricular systolic function.  We will 

give the patient a one-time dose of IV Lasix now, reevaluate on a daily basis.  

Continue the rest of the patient's medications.





DNP note has been reviewed, I agree with a documented findings and plan of care.

 Patient was seen and examined.

## 2020-04-30 NOTE — CONS
CONSULTATION



DATE OF SERVICE:

04/30/2020.



REASON FOR CONSULTATION:

Altered mental status.



HISTORY OF PRESENT ILLNESS:

This is a 79-year-old female who was admitted from the emergency room on April 28.  She

lives at home with her  and was brought in after recently being admitted 
to a

nursing home.  According to the record, she was only in the nursing home for 
short

period time but once at home, within 24 to 48 hours, she became quite short of 
breath.



In the emergency room, the patient was found to have tachycardia, heart rate of 
132,

and on a non-rebreather at 15 L she was able to saturate at 100%.  Her workup 
indicated

she was in acute renal failure, atrial fibrillation, and had pneumonia.



Now on hospital day 3, this morning she had acute altered mental status 
according to

Nursing.  She became nonsensical, could not speak clearly and was not oriented 
to time,

place and person and she had been prior.  Her mental status was so much affected
that

the staff had to get consent for paracentesis that she underwent.



By mid afternoon, the patient began to clear and became more oriented back to 
her

baseline.  My evaluation of her was close to 6 p.m., upon which she seemed quite
tired,

but was oriented to time, place and person.  The patient, however, at times 
appeared to

be easily distracted.  When I would ask her what she was thinking, she would 
look sad

and say she just wants to go home.



Review of her labs over her admission indicates increased risk for metabolic

encephalopathy with a BUN on admission of 83, creatinine 2.19.  White count was

elevated with a left shift.  BNP was also elevated at 13,800, and the patient 
did have

hyperammonemia, which is now resolved.



Vital signs: pulse rate 82, respiratory rate 20, blood pressure 129/63.  The 
patient is

on nasal cannula at 4 L/minute.



Her past medical history is significant for atrial fibrillation, cancer, heart 
failure,

diabetes, hypertension and liver disease.



Review of her chart indicates that she was admitted to [facility] on March 11, 2020, 
with

possible non-STEMI, paroxysmal atrial fibrillation, hypovolemia secondary to 
large-

volume paracentesis, acute UTI, acute renal failure.



This patient also has nonalcoholic cirrhosis, recurrent ascites, prior 
paracentesis,

thrombocytopenia, portal hypertension.  This is thought to have been brought on 
by

medication related.



The patient's history of cancer is related to breast cancer.  The patient also 
suffers

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



Family history is significant for cancer as well as colon cancer.  Cardiac 
disease (her

brother has a history of MI with stents and another brother passed away from 
MI). Her

mother suffered from Parkinson's disease.



Social history: The patient lives with her  but prior to that was briefly
in a

nursing home.



Review of systems: The patient was able to provide some information with this.  
A 10-

point review of systems was obtained with positive pertinents and negatives 
related in

the history of present illness.



The patient reports overall her general health has declined in the past year.  
She

suffers from difficulty with sleep continuity.



General physical examination: The patient is awake in bed. No acute distress.

HEENT:  Clear sclerae.  Clear oropharynx. Neck appears supple.

PULSES:  Radial and pedal pulses appear equal and symmetric.

EXTREMITIES:  Severe peripheral vascular disease in both lower extremities 
bilaterally.

No swelling noted in the hands.  No clubbing of the digits noted.



Neurological examination:

MENTAL STATUS:  Patient is awake. She is able to follow commands and answer 
questions

appropriately, but overall her affect is slow and depressed.

Pupils:  2 mm, reactive to light and accommodation.

Cranial nerves:  The patient tracks well.  No evidence of nystagmus noted on 
vertical

or horizontal gaze.  There is some restriction on upgaze.  Face appears 
symmetric.

Cranial nerve 8 appears intact by clinical observation.  Palate elevates 
symmetrically.

Tongue appears midline without fasciculations or deviation.

Motor exam:  This exam was limited due to the patient's overall fatigue.  She is
able

to move her upper extremities without difficulty.  Pronator drift is negative.  
The

patient is able to provide -5/5 strength over deltoids, triceps, biceps 
bilaterally.

There is notable give-way weakness.  Tone appears normal in the upper 
extremities.  No

fasciculations or tremor noted.

Lower extremity testing:  This was difficult due to the patient's legs with 
wound and

pain and infection.  The patient was not able to lift either leg off the bed but
was

able to flex and extend with discomfort.  She was also able to flex and extend 
the feet

bilaterally.

Coordination testing:  Patient was able to perform the finger-to-nose testing 
with eyes

open and eyes closed.  This was slow, but no overt dysmetria noted.  
Heel-to-shin

maneuver was deferred due to the patient's leg infections.

Sensory examination:  Grossly intact to light touch throughout.

Deep tendon reflexes: Trace over biceps, brachioradialis bilaterally.  Patellar

reflexes are absent.  Plantar responses are withdrawal bilaterally.  Unable to 
elicit

ankle jerks.

Gait examination deferred.



This is a 79-year-old female with very complicated medical history of heart 
disease,

acute on chronic renal failure, and now with pneumonia, peripheral vascular 
disease due

to poor wound healing.  This patient's mental status abrupt change morning is 
unclear,

but now has resolved to some extent.  She is oriented to time, place, person.  
This

patient does have significant stroke risk factors. Would recommend neuro imaging

studies to rule out possible acute ischemic infarct.  The patient also had what 
appears

to be mental status involving an expressive aphasia.  Therefore I would obtain 
an EEG,

as this patient does have significant systemic infection that could lower her 
seizure

threshold.



RECOMMENDATIONS:

1. MRI of the brain without contrast.

2. EEG in the morning.

3. Continue with neuro checks q.2 hours while awake.

4. Aggressive management of glucose, electrolytes and ammonia levels.

5. PT, OT and Speech to evaluate patient.

Thank you for this consult.  Neurology will follow closely.  Please note that 
there

will not be a neurologist available this weekend.  If any acute changes occur, 
please

consider transfer to higher level of care for neurological assessment. Dr. Camp
will

be back on Monday morning for neurology service for the next 2 weeks.





MMFREDRICKL / IJN: [id number] / Job#: [id number]

MTDD

## 2020-04-30 NOTE — P.PN
Subjective


Progress Note Date: 04/30/20


Principal diagnosis: 


 Shortness of breath, weakness





This is a 79-year-old female, familiar to my service, I have seen this patient 

before for ascites, congestive heart failure, liver cirrhosis, mental status 

changes related to hyperammonemia, related to underlying nonalcoholic liver 

cirrhosis.  Patient is also known to have history of diabetes, hypertension, 

seen few weeks ago for falls and weakness, and at the time she had significant 

ascites and significantly elevated ammonia level, underwent large-volume 

paracentesis, developed hypotension, treated mostly with volume replacement, and

she went on to develop fluid overload and diastolic congestive heart failure.  

Patient also had history of paroxysmal atrial fibrillation.  Patient was 

eventually discharged to medical [facility], and apparently over the last few 

days the patient has been generally weak, complaining of shortness of breath, 

sent back to [facility], and her chest x-ray is showing bila

teral infiltrates.  It is not clear whether the findings are findings of 

pneumonia or findings of diastolic congestive heart failure.  Patient was placed

on antibiotics empirically, her covid 19 screening was negative, however it is 

not entirely ruled out.  During my evaluation, patient was noted to be on room 

air, O2 saturation is 92% on room air.  She was noted to be in no form of 

distress.  And she was already started empirically on antibiotics by the 

admitting physician.  Patient was also placed on heparin for paroxysmal atrial 

fibrillation.  On physical examination, patient was noted to have significant 

enlargement of her abdominal girth, and I recommended ultrasound of the abdomen 

patient may require repeat paracentesis.  In the meantime I recommended that we 

continue antibiotics, consider a trial of diuretics.  And I have ordered a serum

pro calcitonin, and I have noticed that her BNP level is significantly elevated.

 Her pro calcitonin is not suggestive of underlying bacterial pneumonia.  It is 

minimally elevated.  Patient is not the greatest historian.  Could not tell me 

exactly why she was sent to the hospital.





On 04/30/2020 patient seen in follow-up on selective care unit, is lethargic, 

but no acute distress, she is on 4 L of oxygen the pulse ox 100%, afebrile, 

hemodynamically stable.  We requested to have interventional radiology do a 

paracentesis on her, and ascites fluid will be sent for cultures, cell count 

with differential.  She is on empiric antibiotics in the form of cefepime and 

vancomycin.  he was given a dose of IV Lasix and she is maintained on oral Lasix

of 40 mg twice daily.  Blood culture has been negative.  Today's labs have been 

reviewed showing white blood cell count of 11.4, hemoglobin of 9.2, electrolytes

were within normal limits, profile relatively stable with BUN of 81 creatinine 

is 2.08.  She is on heparin infusion for history of atrial fibrillation which is

currently on hold for paracentesis.  Lung sounds are diminished at the bases, 

abdomen significantly distended suspect large amount of ascites








Objective





- Vital Signs


Vital signs: 


                                   Vital Signs











Temp  98.3 F   04/30/20 08:35


 


Pulse  82   04/30/20 13:20


 


Resp  20   04/30/20 13:20


 


BP  129/63   04/30/20 13:20


 


Pulse Ox  100   04/30/20 13:20








                                 Intake & Output











 04/29/20 04/30/20 04/30/20





 18:59 06:59 18:59


 


Intake Total 720 73.706 29.072


 


Balance 720 73.706 29.072


 


Weight  100 kg 


 


Intake:   


 


  Intake, IV Titration  73.706 29.072





  Amount   


 


    Heparin Sod,Pork in 0.45%  73.706 29.072





    NaCl 25,000 unit In 0.45   





    % NaCl 1 250ml.bag @ 9   





    UNITS/KG/HR 9.27 mls/hr   





    IV .Q24H [facility] Rx#:   





    [id number]   


 


  Oral 720  


 


Other:   


 


  Voiding Method Bedpan Bedpan Bedpan





 Diaper Diaper Diaper


 


  # Voids 2 22 














- Exam


 GENERAL EXAM: pleasant, 79-year-old lethargic white female, on 4 L of oxygen 

with pulse ox 100% comfortable in no apparent distress.


HEAD: Normocephalic/atraumatic.


EYES: Normal reaction of pupils, equal size.  Conjunctiva pink, sclera white.


NOSE: Clear with pink turbinates.


THROAT: No erythema or exudates.


NECK: No masses, no JVD, no thyroid enlargement, no adenopathy.


CHEST: No chest wall deformity.  Symmetrical expansion. 


LUNGS: Equal air entry with no crackles, wheeze, rhonchi or dullness.


CVS: Regular rate and rhythm, normal S1 and S2, no gallops, no murmurs, no rubs


ABDOMEN: Soft, large distended.  No hepatosplenomegaly, normal bowel sounds, no 

guarding or rigidity.


EXTREMITIES: No clubbing, no edema, no cyanosis, 2+ pulses and upper and lower 

extremities.


MUSCULOSKELETAL: Muscle strength and tone normal.


SPINE: No scoliosis or deformity


SKIN: No rashes


CENTRAL NERVOUS SYSTEM: Alert and oriented -1.  No focal deficits, tone is 

normal in all 4 extremities.


PSYCHIATRIC: Alert and oriented -1.  Appropriate affect.  Intact judgment and 

insight.











- Labs


CBC & Chem 7: 


                                 04/30/20 02:19





                                 04/30/20 02:19


Labs: 


                  Abnormal Lab Results - Last 24 Hours (Table)











  04/29/20 04/29/20 04/30/20 Range/Units





  16:45 20:21 02:19 


 


WBC     (3.8-10.6)  k/uL


 


RBC     (3.80-5.40)  m/uL


 


Hgb     (11.4-16.0)  gm/dL


 


Hct     (34.0-46.0)  %


 


MCHC     (31.0-37.0)  g/dL


 


RDW     (11.5-15.5)  %


 


Plt Count     (150-450)  k/uL


 


Neutrophils #     (1.3-7.7)  k/uL


 


APTT     (22.0-30.0)  sec


 


BUN    81 H  (7-17)  mg/dL


 


Creatinine    2.08 H  (0.52-1.04)  mg/dL


 


Glucose    112 H  (74-99)  mg/dL


 


POC Glucose (mg/dL)  147 H  135 H   (75-99)  mg/dL














  04/30/20 04/30/20 04/30/20 Range/Units





  02:19 02:19 05:55 


 


WBC  11.4 H    (3.8-10.6)  k/uL


 


RBC  3.66 L    (3.80-5.40)  m/uL


 


Hgb  9.2 L    (11.4-16.0)  gm/dL


 


Hct  30.6 L    (34.0-46.0)  %


 


MCHC  30.1 L    (31.0-37.0)  g/dL


 


RDW  17.8 H    (11.5-15.5)  %


 


Plt Count  132 L    (150-450)  k/uL


 


Neutrophils #  9.1 H    (1.3-7.7)  k/uL


 


APTT   73.2 H   (22.0-30.0)  sec


 


BUN     (7-17)  mg/dL


 


Creatinine     (0.52-1.04)  mg/dL


 


Glucose     (74-99)  mg/dL


 


POC Glucose (mg/dL)    104 H  (75-99)  mg/dL








                      Microbiology - Last 24 Hours (Table)











 04/28/20 19:10 Blood Culture - Preliminary





 Blood    No Growth after 24 hours














Assessment and Plan


Plan: 


 Assessment:





Shortness of breath secondary to diastolic congestive heart failure, doubt 

bacterial pneumonia.  Pro calcitonin level is not significantly enlarged.  

However her BNP level is significantly high.


Bilateral infiltrates, negative covid 19, but I don't believe is entirely ruled 

out.


Acute on chronic renal failure


Suspect ongoing ascites, doubt spontaneous bacterial peritonitis, physical 

examination of the abdomen is rather benign and abdomen is nontender.


Suspect ongoing metabolic encephalopathy related mostly to her underlying non

alcoholic liver cirrhosis, doubt sepsis at this point.  Again that is not 

entirely ruled out.


Paroxysmal atrial fibrillation


History of chronic liver disease and nonalcoholic cirrhosis of the liver


History of recurrent ascites


History of vancomycin-resistant enterococcal infection


History of depression


Obesity with body index of 32.3.


History of recurrent urinary tract infections. 





Plan:





Patient is going for paracentesis today, send ascites fluid for Gram stain, 

cultures, cell count and differential.  Continue with empiric antibiotics. 

monitor renal profile, follow-up electrolytes BUN and creatinine in the morning.

follow-up chest x-ray in the morning. 





I performed a history & physical examination of the patient and discussed their 

management with my nurse practitioner, Marija Landers.  I reviewed the nurse 

practitioner's note and agree with the documented findings and plan of care.  

Lung sounds are positive for diffuse wheezes throughoNP statement











Time with Patient: Less than 30

## 2020-04-30 NOTE — PN
PROGRESS NOTE



DATE OF SERVICE:

04/30/2020



This 79-year-old woman was admitted with significant shortness of breath, suspected to

have some pneumonia.  The patient also had change in mental status.  Patient also has

ascites related to cirrhosis liver.  Abdominal paracentesis has been recommended.  A

CAT scan of the brain was also recommended at this time.  Neurology evaluation

underway.  The patient was also thought to have seen CHF.  The abdominal ultrasound

showed enough fluid yesterday.  Serum ammonia at the time of admission was normal.

COVID was negative.  The renal function is worse but stable at 2.08.  The platelets are

132.



PAST MEDICAL HISTORY, REVIEW OF SYSTEMS:

Could not be taken as the patient is drowsy, barely arousable, unable to give a

coherent history.



CURRENT MEDICATIONS:

Reviewed and include:

1. Tylenol 500 mg q.6 p.r.n.

2. Norco 5 mg q.6 p.r.n.

3. Xanax 0.5 t.i.d.

4. Aspirin 81 mg p.o. daily.

5. Cefepime 2 g IV b.i.d.

6. Vitamin D3 two thousand daily.

7. Vitamin B12 one thousand mg daily.

8. Florinef 0.2 b.i.d.

9. Lasix 40 mg p.o. b.i.d.

10.Heparin.

11.Lactulose, magnesium oxide.

12.Lopressor.

13.Narcan.

14.Nitrostat.

15.Protonix.

16.Zoloft.



PHYSICAL EXAM:

Patient is alert, oriented x3.  Pulse 82, blood pressure 120/66, respiration 20,

temperature normal, pulse ox 100% on 4 L.

HEENT: Conjunctivae normal.  Oral mucosa moist.

NECK:  No jugular venous distention.  No lymph node enlargement.

CARDIOVASCULAR SYSTEMS: S1, S2, muffled.

RESPIRATION:  Breath sounds diminished at the bases, a few scattered rhonchi, no

crackles.

ABDOMEN:  Soft, nontender.  No mass palpable.

LEGS:  No edema.  No swelling.

NERVOUS SYSTEM:  No focal deficits.



LABS:

WBC is 11.2, hemoglobin is 9.2, platelets 132.  Creatinine is 2.08.



ASSESSMENT:

1. Shortness of breath, possible bilateral pneumonia, possibly community-acquired,

    with possible COVID-19 associated pneumonia.

2. Congestive heart failure acute exacerbation with acute on chronic diastolic

    dysfunction, ejection fraction 50% to 60%.

3. Suspected COVID-19; however, test negative.

4. Change in mental status, metabolic encephalopathy, acute on chronic.

5. Acute on chronic renal failure with chronic kidney disease stage III baseline, as

    well as possible acute tubular necrosis.

6. Elevated lactic acid possibly secondary to sepsis, present on admission, secondary

    to pneumonia.

7. Troponin 0.069, indeterminate.

8. Hypoalbuminemia.

9. Hyponatremia.

10.Change in mental status, as mentioned earlier.

11.Acute on chronic.

12.Generalized gait dysfunction.

13.Increased WBC.

14.Anemia, normocytic.

15.History of multiple falls recently.

16.History of atrial fibrillation.

17.History of congestive heart failure.

18.History of diabetes mellitus type 2.

19.Hypertension.

20.History of chronic liver disease and nonalcoholic cirrhosis of liver with a history

    of abdominal paracentesis multiple times.

21.History of urinary tract infection.

22.History of recurrent ascites and paracentesis.

23.History of thrombocytopenia.

24.History of left breast cancer with lumpectomy.

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

26.History of depression.

27.History of cholecystectomy.

28.Obesity with a body mass index of 32.6.

29.FULL CODE.



RECOMMENDATION:

Recommend to continue to monitor, symptomatic treatment. Otherwise abdominal

paracentesis.  I would also recommend a CT scan of the brain, neuro checks, neurology

evaluation. The patient is slightly more confused today.  Continue with antibiotics. I

would sedatives with IV pain medications.  Guarded prognosis because of multiple

complex medical issues. Further recommendations to follow. Follow closely with multiple

consultants.  Prognosis guarded.





MMODL / IJN: [id number] / Job#: [id number]

## 2020-04-30 NOTE — PN
PROGRESS NOTE



DATE OF SERVICE:

04/30/2020



REASON FOR FOLLOWUP:

Abnormal x-ray and a question of pneumonia.



INTERVAL HISTORY:

The patient is currently afebrile.  The patient is breathing comfortably. The patient

denies having any chest pain or shortness of breath.  Minimal cough.  No abdominal pain

or any diarrhea.  The patient is status post paracentesis today, which the patient has

tolerated.



PHYSICAL EXAMINATION:

Blood pressure 108/47 with a pulse of 120, temperature 98.2. She is 90% on 4 L nasal

cannula.

General description is an elderly female lying in bed in no distress.

HEENT EXAMINATION: Slight pallor.  No scleral icterus. Oral mucosa membrane is dry.

LUNGS: Unlabored breathing. Decreased breath sounds in the base. No wheeze.

HEART: S1, S2.  Regular rate and rhythm.

ABDOMEN: Soft. No tenderness.

EXTREMITIES: No edema of the feet.



LABS:

Hemoglobin 9.1, white count 11.4, BUN of 81, creatinine 2.08.  The ascitic fluid white

count of only 178 was clear.  COVID-19 testing is negative.  Blood culture has been

negative.  No sputum has been collected.



DIAGNOSTIC IMPRESSION AND PLAN:

Patient admitted to hospital with shortness of breath, more likely related to

underlying fluid overload, chronic obstructive pulmonary disease or congestive heart

failure exacerbation.  Clinically not behaving as pneumonia in this patient with

borderline kidney function, high risk of nephrotoxicity.  We will discontinue the

vancomycin.  Continue Rocephin while waiting for the culture to finalize and monitor

her clinical course closely.





MMODL / IJN: [id number] / Job#: [id number]

## 2020-05-01 LAB
ANION GAP SERPL CALC-SCNC: 10 MMOL/L
BASOPHILS # BLD AUTO: 0 K/UL (ref 0–0.2)
BASOPHILS NFR BLD AUTO: 0 %
BUN SERPL-SCNC: 81 MG/DL (ref 7–17)
CALCIUM SPEC-MCNC: 8.6 MG/DL (ref 8.4–10.2)
CHLORIDE SERPL-SCNC: 106 MMOL/L (ref 98–107)
CHOLEST SERPL-MCNC: 103 MG/DL (ref ?–200)
CO2 SERPL-SCNC: 24 MMOL/L (ref 22–30)
EOSINOPHIL # BLD AUTO: 0.1 K/UL (ref 0–0.7)
EOSINOPHIL NFR BLD AUTO: 1 %
ERYTHROCYTE [DISTWIDTH] IN BLOOD BY AUTOMATED COUNT: 3.75 M/UL (ref 3.8–5.4)
ERYTHROCYTE [DISTWIDTH] IN BLOOD: 17.2 % (ref 11.5–15.5)
GLUCOSE BLD-MCNC: 114 MG/DL (ref 75–99)
GLUCOSE BLD-MCNC: 117 MG/DL (ref 75–99)
GLUCOSE BLD-MCNC: 210 MG/DL (ref 75–99)
GLUCOSE BLD-MCNC: 239 MG/DL (ref 75–99)
GLUCOSE BLD-MCNC: 77 MG/DL (ref 75–99)
GLUCOSE SERPL-MCNC: 75 MG/DL (ref 74–99)
HCT VFR BLD AUTO: 32.4 % (ref 34–46)
HDLC SERPL-MCNC: 22 MG/DL (ref 40–60)
HGB BLD-MCNC: 9.5 GM/DL (ref 11.4–16)
LDLC SERPL CALC-MCNC: 70 MG/DL (ref 0–99)
LYMPHOCYTES # SPEC AUTO: 0.7 K/UL (ref 1–4.8)
LYMPHOCYTES NFR SPEC AUTO: 6 %
MCH RBC QN AUTO: 25.3 PG (ref 25–35)
MCHC RBC AUTO-ENTMCNC: 29.2 G/DL (ref 31–37)
MCV RBC AUTO: 86.4 FL (ref 80–100)
MONOCYTES # BLD AUTO: 0.5 K/UL (ref 0–1)
MONOCYTES NFR BLD AUTO: 5 %
NEUTROPHILS # BLD AUTO: 9.9 K/UL (ref 1.3–7.7)
NEUTROPHILS NFR BLD AUTO: 87 %
PLATELET # BLD AUTO: 77 K/UL (ref 150–450)
POTASSIUM SERPL-SCNC: 5.8 MMOL/L (ref 3.5–5.1)
SODIUM SERPL-SCNC: 140 MMOL/L (ref 137–145)
TRIGL SERPL-MCNC: 54 MG/DL (ref ?–150)
VIT B12 SERPL-MCNC: 1611 PG/ML (ref 200–944)
WBC # BLD AUTO: 11.4 K/UL (ref 3.8–10.6)

## 2020-05-01 PROCEDURE — 0W9G3ZZ DRAINAGE OF PERITONEAL CAVITY, PERCUTANEOUS APPROACH: ICD-10-PCS

## 2020-05-01 RX ADMIN — CEFAZOLIN SCH: 330 INJECTION, POWDER, FOR SOLUTION INTRAMUSCULAR; INTRAVENOUS at 17:31

## 2020-05-01 RX ADMIN — FUROSEMIDE SCH MG: 10 INJECTION, SOLUTION INTRAMUSCULAR; INTRAVENOUS at 21:50

## 2020-05-01 RX ADMIN — LACTULOSE SCH GM: 20 SOLUTION ORAL at 09:08

## 2020-05-01 RX ADMIN — CEFEPIME HYDROCHLORIDE SCH MLS/HR: 2 INJECTION, POWDER, FOR SOLUTION INTRAVENOUS at 06:38

## 2020-05-01 RX ADMIN — FUROSEMIDE SCH MG: 40 TABLET ORAL at 09:07

## 2020-05-01 RX ADMIN — FLUDROCORTISONE ACETATE SCH MG: 0.1 TABLET ORAL at 21:50

## 2020-05-01 RX ADMIN — PANTOPRAZOLE SODIUM SCH MG: 40 TABLET, DELAYED RELEASE ORAL at 06:38

## 2020-05-01 RX ADMIN — METOPROLOL TARTRATE SCH MG: 25 TABLET, FILM COATED ORAL at 21:50

## 2020-05-01 RX ADMIN — Medication SCH MG: at 09:07

## 2020-05-01 RX ADMIN — Medication SCH UNIT: at 09:07

## 2020-05-01 RX ADMIN — ASPIRIN 81 MG CHEWABLE TABLET SCH MG: 81 TABLET CHEWABLE at 09:07

## 2020-05-01 RX ADMIN — FLUDROCORTISONE ACETATE SCH MG: 0.1 TABLET ORAL at 09:08

## 2020-05-01 RX ADMIN — SERTRALINE HYDROCHLORIDE SCH MG: 50 TABLET, FILM COATED ORAL at 09:07

## 2020-05-01 RX ADMIN — GABAPENTIN SCH MG: 100 CAPSULE ORAL at 21:50

## 2020-05-01 RX ADMIN — APIXABAN SCH MG: 2.5 TABLET, FILM COATED ORAL at 09:07

## 2020-05-01 RX ADMIN — METOPROLOL TARTRATE SCH MG: 25 TABLET, FILM COATED ORAL at 09:07

## 2020-05-01 RX ADMIN — CYANOCOBALAMIN TAB 500 MCG SCH MCG: 500 TAB at 09:07

## 2020-05-01 RX ADMIN — APIXABAN SCH MG: 2.5 TABLET, FILM COATED ORAL at 21:51

## 2020-05-01 RX ADMIN — Medication SCH MG: at 09:08

## 2020-05-01 NOTE — US
Ultrasound-guided paracentesis.

 

DATE OF EXAM: 4/30/2020

 

CLINICAL HISTORY:  Ascites

 

The procedure was discussed with the patient. The risks, complications, benefits, and alternatives we
re discussed and any questions were answered. Informed consent was obtained. The patient was placed s
upine on the ultrasound table and prepped and draped in the usual sterile fashion. 

All elements of maximal barrier technique were utilized.  Under ultrasound guidance, access into the 
right lower quadrant was obtained, via the paracentesis catheter system and direct ultrasound guidanc
e.

 

Approximately 2.5 liters of straw-colored fluid was removed. The patient was stable throughout the pr
ocedure and remained stable upon discharge from Department of Radiology.

 

IMPRESSION: 

Successful  paracentesis under ultrasound guidance.

## 2020-05-01 NOTE — XR
EXAMINATION TYPE: XR chest 1V portable

 

DATE OF EXAM: 5/1/2020

 

COMPARISON: 4/29/2020

 

HISTORY: Shortness of breath

 

TECHNIQUE: Single frontal view of the chest is obtained.

 

FINDINGS:  New multifocal opacities are scattered throughout the right lung with bibasilar opacities 
remaining. Enlarged cardiac mediastinal silhouette. Mild pulmonary vascular congestion. Probable trac
e right pleural effusion.

 

IMPRESSION:  New multifocal patchy opacities. Consider multifocal pneumonia. Mild pulmonary vascular 
congestion and probable trace pleural effusion remaining.

## 2020-05-01 NOTE — MR
EXAMINATION TYPE: MR brain wo con

 

 DATE OF EXAM: 5/1/2020

 

COMPARISON: CT brain dated 4/30/2020

 

HISTORY: Acute AMS. Fast brain protocol used as there is some patient motion, limiting the exam.

 

TECHNIQUE: 

Multiplanar, multisequence images of the brain and brainstem is performed without intravenous contras
t.

 

FINDINGS: Diffusion weighted images demonstrate no evidence of a recent infarct or other diffusion ab
normality.  There is no extra-axial fluid collection. There are scattered foci of T2/FLAIR hyperinten
sity in the periventricular and subcortical white matter, overall moderate burden for the patient's a
ge. The ventricular system and cisternal spaces are nearly symmetrically prominent compatible with ag
e-related volume loss. Additionally there is thinning of the corpus callosum within a degenerative ba
sis.

 

Midline structures demonstrate normal morphology.  Incidentally noted partially empty sella turcica. 
The craniocervical junction appears within normal limits.  The dural venous sinuses appear patent. Th
e visualized sinuses demonstrate mild mucosal thickening of the maxillary sinuses bilaterally and min
imal mucosal thickening in the ethmoid sinuses. Remaining paranasal sinuses are well aerated. Partial
 opacification of the right mastoid air cells.

 

IMPRESSION: 

1. No acute infarct, midline shift or mass effect. Moderate burden nonspecific white matter change mo
st commonly on the microangiopathy and generalized diffuse cerebral atrophy, most commonly age relate
d.

2. Mild paranasal sinus disease and partial opacification of the right mastoid air cells. Correlate w
ith point tenderness to exclude mastoiditis.

## 2020-05-01 NOTE — PN
PROGRESS NOTE



DATE OF SERVICE:

05/01/2020



This 79 woman was admitted with multiple medical issues including shortness of breath

with possible CHF and pneumonia, is being closely monitored.  Patient also has some

change in mental status.  Patient also had abdominal ascites and acute paracentesis

done about 2.5 L. Patient had bilateral ecchymoses on the face.  Brain MRI was done per

neurology recommendations.  Otherwise, the final report is pending at this time.  The

most recent chest x-ray which was personally reviewed by me showed bilateral lesions,

right more than the left, suggestive of pneumonia.  The patient is currently on

cefepime at this time.  A combination cefepime and vancomycin which was discontinued.

The cultures are negative so far.



WBC 11.4, potassium is 5.8.



PAST MEDICAL HISTORY:

Reviewed.



REVIEW OF SYSTEMS:

CARDIOVASCULAR SYSTEM: No angina.

RESPIRATION: As mentioned earlier.

GI: As mentioned earlier.

: As mentioned earlier.

NERVOUS SYSTEM:  Slight improvement in the mentation today.



CURRENT MEDICATIONS:

1. Tylenol p.r.n.

2. Norco 5 mg q.6h p.r.n.

3. Xanax 0.5 t.i.d. p.r.n.

4. Eliquis 2.5 mg b.i.d.

5. Aspirin 81 mg daily.

6. Cefepime 2 g IV daily.

7. Vitamin B2.

8. Florinef 0.2 b.i.d.

9. Lasix 40 mg b.i.d.

10.Neurontin.

11.Cephulac.

12.Magnesium oxide.

13.Lopressor.

14.Niacin.

15.Nitrostat.

16.Protonix.

17.Zoloft.

18.All obtained doses reviewed.



PHYSICAL EXAM:

Patient is alert, oriented x1.  Pulse 65, blood pressure 102/50, respiration 20,

temperature 98 degrees, pulse ox 94% on 4 L.

HEENT:  Conjunctivae normal.

NECK:  No jugular venous distension.

CARDIOVASCULAR:  S1, S2, muffled.

RESPIRATORY:  Breath sounds diminished at the bases, a few scattered rhonchi, no

crackles.

ABDOMEN:  Soft.  Diffuse ascites present.  Some leaking at the ascitic tap site on the

right side of the abdomen also present.

LEGS:  Bilateral leg edema.

NERVOUS SYSTEM:  Diffusely weak.



LABS:

WBC 7.5, hemoglobin IS 10.5, potassium 5.8, creatinine is 2.08.



ASSESSMENT:

1. Shortness of breath, multifactorial, possible bilateral pneumonia, possibly

    community-acquired, possibly COVID-19 associated pneumonia.

2. Congestive heart failure acute exacerbation with acute on chronic diastolic

    dysfunction, ejection fraction 50% to 60%.

3. Suspected COVID-19 but; however, test negative.

4. Change in mental status, metabolic encephalopathy, acute on chronic.

5. Hyperkalemia.

6. Acute renal failure with acute tubular necrosis on baseline, chronic kidney disease

    stage III.

7. Elevated lactic acid possibly secondary to sepsis present on admission, secondary

    to pneumonia.

8. Troponin 0.069, indeterminate.

9. Hypoalbuminemia.

10.Hyponatremia.

11.Change in mental status, as mentioned earlier.

12.Paroxysmal atrial fibrillation, on Eliquis.

13.Generalized gait dysfunction.

14.Increased WBC.

15.Anemia, normocytic.

16.History of multiple falls recently.

17.History of congestive heart failure.

18.History of diabetes mellitus type 2.

19.Hypertension.

20.History of chronic liver disease and nonalcoholic cirrhosis liver with history of

    multiple abdominal paracentesis.

21.History of urinary tract infection.

22.History of recurrent ascites and paracentesis.

23.History of thrombocytopenia.

24.History of left breast cancer with lumpectomy.

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

26.History of depression.

27.History of cholecystectomy.

28.History of obesity with body mass index of 32.6.

29.FULL CODE.



RECOMMENDATION:

In this 79-year-old woman who presented with multiple complex medical issues, will

monitor the patient closely, continue with the current management and symptomatic

treatment. Otherwise, at this time I would recommend continue with empiric antibiotics,

bronchodilators.  Otherwise, continue with anticoagulation.  Follow the cultures, PT,

OT evaluation, possible ECF rehab in the next few days once the patient's sensorium is

stabilized.  Will follow the patient closely.  Prognosis guarded because of multiple

consultants.  I would also recommend Kayexalate for the hyperkalemia and monitor lytes

closely.





MMODL / IJN: [id number] / Job#: [id number]

## 2020-05-01 NOTE — PN
PROGRESS NOTE



DATE OF SERVICE:

05/01/2020



REASON FOR FOLLOWUP:

Possible pneumonia.



INTERVAL HISTORY:

The patient is currently afebrile.  The patient is breathing more comfortably. 
The

patient denies having any chest pain.  Minimal cough.  No nausea, vomiting. 
Abdominal

pain and distention have improved after paracentesis, and no diarrhea.



PHYSICAL EXAMINATION:

Blood pressure 117/55 with a pulse of 81, temperature 97.6.  She is 98% on 3 L 
nasal

cannula.

General description is an elderly female lying in bed in no distress.

RESPIRATORY SYSTEM: Unlabored breathing with decreased breath sounds at the 
base. No

wheeze.

HEART: S1, S2.  Regular rate and rhythm.

ABDOMEN: Soft. No tenderness.



LABS:

Hemoglobin is 9.4, white count 11.4, BUN of 21, creatinine 2.08.  Blood culture

negative. Abdominal fluid culture so far pending.



DIAGNOSTIC IMPRESSION AND PLAN:

Patient with an abnormal x-ray and slightly elevated white count, possibly 
related to fluid

overload.  Clinically not behaving as pneumonia. On empiric cefepime; to 
continue while

waiting for the culture to finalize. Continue with supportive care.





MMODL / IJN: [id number] / Job#: [id number]

[email or web address]

## 2020-05-01 NOTE — EEG
ELECTROENCEPHALOGRAM REPORT



DATE OF SERVICE:

05/01/2020.



HISTORY:

This is an inpatient EEG performed on a 79-year-old female with a multitude of 
medical

problems such as liver disease, cardiac disease.  This EEG is being performed 
due to

questionable altered mental status, possible seizure activity.  On the second 
day of

admission she was found to have some brief episodes of altered mental status and
speech

arrest.  No prior EEG is available for comparison.



TECHNICAL REPORT:

This is an inpatient EEG performed on the [employer] EEG monitor with electrodes 
placed

according to the international 10-20 system and a single EKG channel.  
Simultaneous

video EEG monitoring was performed.  This EEG was reviewed in both longitudinal

bipolar, common average referential and transverse montages.  Photic stimulation
was

performed.  Hyperventilation was not performed due to the patient's medical 
conditions.



Throughout this recording it was technically difficult due to excessive EMG 
artifact

contaminating the tracing.  The patient frequently was biting down, grinding 
teeth.

When the patient would relax and open her mouth, this resolved.  The recording 
begins

with the patient somewhat lethargic and unable to respond to simple questions.  
There

is increased muscle artifact noted in the bifrontal temporal head region.



At 18:28:23 the patient spontaneously opens her eyes looking to the right.  This
is

only associated with motion and movement artifact.



Eye opening and frequent eye blinking occurs at 18:29:23. This is associated 
with a

very suppressed background with rhythms ranging in the 7 to 8 Hz range.



At 18:30:35 the technologist asks the patient questions.  She has no response, 
and the

background continues to remain low voltage and suppressed.  No epileptiform 
activity is

noted.



Photic stimulation was performed at various flash frequencies and failed to 
elicit a

consistent driving response.  During photic there were intermittent bursts of 
excessive

muscle artifact present.



Even while the patient's eyes were closed, there was still excessive EMG 
artifact

noted, often followed by 2-3 seconds of suppression in the background.  This 
appeared

to be related to the patient locking her jaw, then relaxing.



At 18:46:26 the patient is asked to open her mouth.  She is able to follow this

command, and once this occurs the EMG artifact resolves.  However, the 
background

remains very suppressed of low amplitude ranging in 7 Hz consistent with 
drowsiness.



As soon as the patient closes her mouth, the EMG artifact increases again in a

generalized distribution, most likely consistent with the patient locking her 
jaw.



IMPRESSION:

This EEG was technically difficult to interpret.  Overall it is considered 
abnormal due

to the slow background for the patient's stated age.  Despite appearing somewhat

wakeful, the background is very slow, ranging at 7 Hz and rarely reaching a 
normal wake

posterior-dominant rhythm of 8.



The recording was considered technically difficult due to the patient constantly

locking her jaw.  This, however, was not associated with any clinical seizure 
activity

or electrographic seizure or any focal slowing.



CLINICAL CORRELATION:

This EEG is consistent with generalized diffuse cerebral dysfunction, often seen
with

encephalopathic states.  Serial EEGs are recommended and/or if clinically 
indicated a

more prolonged overnight study.  Neuro imaging studies are also recommended.





MMODL / IJN: [id number] / Job#: [id number]

KRANTHI

## 2020-05-01 NOTE — P.PN
Subjective


Progress Note Date: 05/01/20


Principal diagnosis: 


 Shortness of breath, weakness





This is a 79-year-old female, familiar to my service, I have seen this patient 

before for ascites, congestive heart failure, liver cirrhosis, mental status 

changes related to hyperammonemia, related to underlying nonalcoholic liver 

cirrhosis.  Patient is also known to have history of diabetes, hypertension, 

seen few weeks ago for falls and weakness, and at the time she had significant 

ascites and significantly elevated ammonia level, underwent large-volume 

paracentesis, developed hypotension, treated mostly with volume replacement, and

she went on to develop fluid overload and diastolic congestive heart failure.  

Patient also had history of paroxysmal atrial fibrillation.  Patient was 

eventually discharged to medical [place] [facility]hab, and apparently over the last few 

days the patient has been generally weak, complaining of shortness of breath, 

sent back to [facility], and her chest x-ray is showing bila

teral infiltrates.  It is not clear whether the findings are findings of 

pneumonia or findings of diastolic congestive heart failure.  Patient was placed

on antibiotics empirically, her covid 19 screening was negative, however it is 

not entirely ruled out.  During my evaluation, patient was noted to be on room 

air, O2 saturation is 92% on room air.  She was noted to be in no form of 

distress.  And she was already started empirically on antibiotics by the 

admitting physician.  Patient was also placed on heparin for paroxysmal atrial 

fibrillation.  On physical examination, patient was noted to have significant 

enlargement of her abdominal girth, and I recommended ultrasound of the abdomen 

patient may require repeat paracentesis.  In the meantime I recommended that we 

continue antibiotics, consider a trial of diuretics.  And I have ordered a serum

pro calcitonin, and I have noticed that her BNP level is significantly elevated.

 Her pro calcitonin is not suggestive of underlying bacterial pneumonia.  It is 

minimally elevated.  Patient is not the greatest historian.  Could not tell me 

exactly why she was sent to the hospital.





On 04/30/2020 patient seen in follow-up on selective care unit, is lethargic, 

but no acute distress, she is on 4 L of oxygen the pulse ox 100%, afebrile, 

hemodynamically stable.  We requested to have interventional radiology do a 

paracentesis on her, and ascites fluid will be sent for cultures, cell count 

with differential.  She is on empiric antibiotics in the form of cefepime and 

vancomycin.  he was given a dose of IV Lasix and she is maintained on oral Lasix

of 40 mg twice daily.  Blood culture has been negative.  Today's labs have been 

reviewed showing white blood cell count of 11.4, hemoglobin of 9.2, electrolytes

were within normal limits, profile relatively stable with BUN of 81 creatinine 

is 2.08.  She is on heparin infusion for history of atrial fibrillation which is

currently on hold for paracentesis.  Lung sounds are diminished at the bases, 

abdomen significantly distended suspect large amount of ascites





On 05/01/2020 patient seen in follow-up on selective care unit, she is status 

post paracentesis with removal of 2.5 liters of ascitic fluid.  Ascites fluid 

cultures are pending at this time, cell count showed LVH of 65, glucose of 108, 

fluid Analy nuclear WBCs were 10, and fluid mononuclear WBCs were 90.  More 

consistent with a picture of CHF, liver cirrhosis, and not infection.  Patient 

remains on combination of antibiotics of cefepime and vancomycin.  She is more 

awake and alert on today's exam.  She is going for brain MRI today she was seen 

by neurology in consultation for episode of altered mental status, with alterati

on in her speech and confusion.  Brain CT was obtained yesterday showing no 

acute intracranial process, mild to moderate diffuse cerebral atrophy and 

chronic small vessel ischemic changes.  Signs are stable, no worsening dyspnea, 

she is on 4 L of oxygen the pulse ox of %.  She's been afebrile.  Today's 

chest x-ray reviewed showing new multifocal patchy opacities likely related to 

mild pulmonary vascular congestion and possibility of pneumonia is not excluded.








Objective





- Vital Signs


Vital signs: 


                                   Vital Signs











Temp  98 F   05/01/20 03:26


 


Pulse  60   05/01/20 08:00


 


Resp  20   05/01/20 08:00


 


BP  102/50   05/01/20 08:00


 


Pulse Ox  94 L  05/01/20 08:00








                                 Intake & Output











 04/30/20 05/01/20 05/01/20





 18:59 06:59 18:59


 


Intake Total 29.072  


 


Output Total 700 250 


 


Balance -670.928 -250 


 


Weight 100 kg 92 kg 


 


Intake:   


 


  Intake, IV Titration 29.072  





  Amount   


 


    Heparin Sod,Pork in 0.45% 29.072  





    NaCl 25,000 unit In 0.45   





    % NaCl 1 250ml.bag @ 9   





    UNITS/KG/HR 9.27 mls/hr   





    IV .Q24H [facility] Rx#:   





    [id number]   


 


Output:   


 


  Urine 700 250 


 


Other:   


 


  Voiding Method Bedpan Bedpan 





 Diaper Diaper 


 


  # Bowel Movements  1 














- Exam


 GENERAL EXAM: pleasant, 79-year-old much more awake and today's exam white 

female, on 4 L of oxygen with pulse ox 94% comfortable in no apparent distress.


HEAD: Normocephalic/atraumatic.


EYES: Normal reaction of pupils, equal size.  Conjunctiva pink, sclera white.


NOSE: Clear with pink turbinates.


THROAT: No erythema or exudates.


NECK: No masses, no JVD, no thyroid enlargement, no adenopathy.


CHEST: No chest wall deformity.  Symmetrical expansion. 


LUNGS: Equal air entry with no crackles, wheeze, rhonchi or dullness.


CVS: Regular rate and rhythm, normal S1 and S2, no gallops, no murmurs, no rubs


ABDOMEN: Soft, large distended.  No hepatosplenomegaly, normal bowel sounds, no 

guarding or rigidity.


EXTREMITIES: No clubbing, no edema, no cyanosis, 2+ pulses and upper and lower 

extremities.


MUSCULOSKELETAL: Muscle strength and tone normal.


SPINE: No scoliosis or deformity


SKIN: No rashes


CENTRAL NERVOUS SYSTEM: Alert and oriented -2.  No focal deficits, tone is 

normal in all 4 extremities.


PSYCHIATRIC: Alert and oriented -2.  Appropriate affect.  Intact judgment and 

insight.











- Labs


CBC & Chem 7: 


                                 05/01/20 08:24





                                 05/01/20 06:38


Labs: 


                  Abnormal Lab Results - Last 24 Hours (Table)











  04/30/20 04/30/20 05/01/20 Range/Units





  16:30 20:27 06:38 


 


WBC     (3.8-10.6)  k/uL


 


RBC     (3.80-5.40)  m/uL


 


Hgb     (11.4-16.0)  gm/dL


 


Hct     (34.0-46.0)  %


 


MCHC     (31.0-37.0)  g/dL


 


RDW     (11.5-15.5)  %


 


Plt Count     (150-450)  k/uL


 


Neutrophils #     (1.3-7.7)  k/uL


 


Lymphocytes #     (1.0-4.8)  k/uL


 


Potassium    5.8 H  (3.5-5.1)  mmol/L


 


BUN    81 H  (7-17)  mg/dL


 


Creatinine    2.08 H  (0.52-1.04)  mg/dL


 


POC Glucose (mg/dL)   117 H   (75-99)  mg/dL


 


HDL Cholesterol    22 L  (40-60)  mg/dL


 


Urine Appearance  Cloudy H    (Clear)  


 


Uric Acid Crystals  Occasional H    (None)  /hpf


 


Hyaline Casts  3 H    (0-2)  /lpf


 


Urine Mucus  Rare H    (None)  /hpf














  05/01/20 05/01/20 Range/Units





  08:24 11:50 


 


WBC  11.4 H   (3.8-10.6)  k/uL


 


RBC  3.75 L   (3.80-5.40)  m/uL


 


Hgb  9.5 L   (11.4-16.0)  gm/dL


 


Hct  32.4 L   (34.0-46.0)  %


 


MCHC  29.2 L   (31.0-37.0)  g/dL


 


RDW  17.2 H   (11.5-15.5)  %


 


Plt Count  77 L   (150-450)  k/uL


 


Neutrophils #  9.9 H   (1.3-7.7)  k/uL


 


Lymphocytes #  0.7 L   (1.0-4.8)  k/uL


 


Potassium    (3.5-5.1)  mmol/L


 


BUN    (7-17)  mg/dL


 


Creatinine    (0.52-1.04)  mg/dL


 


POC Glucose (mg/dL)   114 H  (75-99)  mg/dL


 


HDL Cholesterol    (40-60)  mg/dL


 


Urine Appearance    (Clear)  


 


Uric Acid Crystals    (None)  /hpf


 


Hyaline Casts    (0-2)  /lpf


 


Urine Mucus    (None)  /hpf








                      Microbiology - Last 24 Hours (Table)











 04/30/20 14:07 Gram Stain - Preliminary





 Ascites Fluid Body Fluid Culture - Preliminary


 


 04/30/20 12:55 Anaerobic Culture - Preliminary





 Paracentesis Fluid 


 


 04/28/20 19:10 Blood Culture - Preliminary





 Blood    No Growth after 48 hours














Assessment and Plan


Plan: 


 Assessment:





Shortness of breath secondary to diastolic congestive heart failure, doubt 

bacterial pneumonia.  Pro calcitonin level is not significantly enlarged.  

However her BNP level is significantly high.


Bilateral infiltrates, negative covid 19, but I don't believe is entirely ruled 

out.


Acute on chronic renal failure


Suspect ongoing ascites, doubt spontaneous bacterial peritonitis, physical 

examination of the abdomen is rather benign and abdomen is nontender.


Suspect ongoing metabolic encephalopathy related mostly to her underlying 

nonalcoholic liver cirrhosis, doubt sepsis at this point.  Again that is not 

entirely ruled out.


Paroxysmal atrial fibrillation


History of chronic liver disease and nonalcoholic cirrhosis of the liver


History of recurrent ascites


History of vancomycin-resistant enterococcal infection


History of depression


Obesity with body index of 32.3.


History of recurrent urinary tract infections.


Altered mental status, rule out possibility of acute ischemic infarct, brain CT 

showed no acute intracranial process, brain MRI is pending, EEGs pending.  

Neurology service is following


Plan:





Continue same antibiotics, ascites fluid analysis is not consistent with 

infection, will await final cultures.  Patient denies any worsening dyspnea, 

more awake and today's exam.  Today's chest x-ray has been reviewed showing new 

multifocal patchy opacities the possibility of multifocal pneumonia. Patient is 

afebrile, she is having episodes of altered mental status and neurology workup 

is underway.  Brain MRI is pending.  Continue with cefepime and vancomycin, 

continue with IV Lasix.  Daily labs, electrolytes and renal profile.  Will 

continue to follow





I performed a history & physical examination of the patient and discussed their 

management with my nurse practitioner, Marija Landers.  I reviewed the nurse 

practitioner's note and agree with the documented findings and plan of care.  

Lung sounds are positive for diffuse wheezes throughoNP statement











Time with Patient: Less than 30

## 2020-05-01 NOTE — P.PN
Subjective


Progress Note Date: 05/01/20





This is a 79-year-old  female with past medical history significant for

paroxysmal atrial fibrillation, on long-term anticoagulation, hypertension, 

hyperlipidemia, diabetes, nonalcoholic cirrhosis, coronary artery disease.  

Patient had an abnormal stress test in March of this year and at that time was 

recommended to undergo cardiac catheterization.  Unfortunately she became 

medically unstable and therefore the cardiac catheterization had not been 

performed.  She follows with Dr. Puga in the office.  Echocardiogram with 

Doppler study performed in March of this year revealed a normal left ventricular

systolic function.  Patient presents to the hospital on this occasion with 

symptoms of 2-3 day duration of progressively worsening shortness of breath.  

Her EKG on admission showed atrial fibrillation with a rapid ventricular 

response, heart rate 148, subsequent EKG showed normal sinus rhythm.  Patient 

remains in a normal sinus rhythm this morning.  Subsequent chest x-ray was also 

performed, which revealed congestive heart failure.  Her initial chest x-ray 

showed diffuse patchy infiltrates in the right lower lobe.  Temperature 97.7, 

blood pressure 128/58 with a heart rate in the 70s, 99% on 2 L of oxygen.  White

blood cell count 12.9, hemoglobin 10.0, platelet count 153.  Sodium 136, 

potassium 4.6, BUN 83, and creatinine 2.1 yesterday, 84 and 2.1 today.  

Coronavirus testing and negative, pro calcitonin 0.38, BNP level 13,800, 

troponin 0.069.At the time of her examination by Dr. Escalante, patient was not in

any acute distress, still complaining of some mild shortness of breath however.





04/30/2020


Patient was seen and examined this morning, she does feel as though her breathi

ng is improving, her weight is down 3 kg today.  Blood pressure 126/40 with a 

heart rate in the 70s, 95% on 3 L of oxygen.  White blood cell count 11.4, 

hemoglobin 9.2, platelet count 132.  Sodium 138, potassium 4.6, BUN 81, 

creatinine 2.0.  Patient is currently on oral diuretics.  We will give her one 

time dose of additional 40 mg IV Lasix today.  Check her labs in the morning.








05/01/2020


Patient was seen and examined this morning by Dr. Escalante, continues to feel 

short of breath.  Blood pressure 102/50 with a heart rate in the 60s, 94% on 4 L

of oxygen.  A repeat chest x-ray was performed this morning and continues to be 

pending.  Patient again underwent successful paracentesis under ultrasound 

guidance this morning, for 2.5 L of straw-colored fluid.  White blood cell count

11.4, hemoglobin 9.5, platelet count 77.  Sodium 140, potassium 5.8, BUN 81 and 

creatinine 2.0.  We will start the patient today on a twice a day dose of IV 

Lasix, hold the oral diuretics for now.  Check lytes BUN and creatinine in the 

morning.








Objective





- Vital Signs


Vital signs: 


                                   Vital Signs











Temp  98 F   05/01/20 03:26


 


Pulse  60   05/01/20 08:00


 


Resp  20   05/01/20 08:00


 


BP  102/50   05/01/20 08:00


 


Pulse Ox  94 L  05/01/20 08:00








                                 Intake & Output











 04/30/20 05/01/20 05/01/20





 18:59 06:59 18:59


 


Intake Total 29.072  


 


Output Total 700 250 


 


Balance -670.928 -250 


 


Weight 100 kg 92 kg 


 


Intake:   


 


  Intake, IV Titration 29.072  





  Amount   


 


    Heparin Sod,Pork in 0.45% 29.072  





    NaCl 25,000 unit In 0.45   





    % NaCl 1 250ml.bag @ 9   





    UNITS/KG/HR 9.27 mls/hr   





    IV .Q24H [facility] Rx#:   





    [id number]   


 


Output:   


 


  Urine 700 250 


 


Other:   


 


  Voiding Method Bedpan Bedpan 





 Diaper Diaper 


 


  # Bowel Movements  1 














- Exam





Physical Exam: Revealed 79-year-old female in no distress, not a great 

historian.


Head: Atraumatic, normocephalic.


HEENT:Neck is supple.] [No neck masses.No thyromegaly.No JVD.


Chest: [Diminished breath sounds at the bases bilaterally ,no rhonchi or 

wheezes.]


Cardiac Exam: [Normal S1 and S2, no S3 gallop, no murmur.]


Abdomen: [Large abdomen,  ascites, no tenderness, abdominal wall edema is 

noted.]


Extremities: [No clubbing, 1+ bipedal edema, no cyanosis.]  Chronic venous 

stasis changes.


Neurological Exam: Confused, oriented 2.


Psychiatric: Depressed mood, blunt affect, poor mental status.


Skin: No rashes.  Chronic venous stasis changes noted in lower extremities.








- Labs


CBC & Chem 7: 


                                 05/01/20 08:24





                                 05/01/20 06:38


Labs: 


                  Abnormal Lab Results - Last 24 Hours (Table)











  04/30/20 04/30/20 05/01/20 Range/Units





  16:30 20:27 06:38 


 


WBC     (3.8-10.6)  k/uL


 


RBC     (3.80-5.40)  m/uL


 


Hgb     (11.4-16.0)  gm/dL


 


Hct     (34.0-46.0)  %


 


MCHC     (31.0-37.0)  g/dL


 


RDW     (11.5-15.5)  %


 


Plt Count     (150-450)  k/uL


 


Neutrophils #     (1.3-7.7)  k/uL


 


Lymphocytes #     (1.0-4.8)  k/uL


 


Potassium    5.8 H  (3.5-5.1)  mmol/L


 


BUN    81 H  (7-17)  mg/dL


 


Creatinine    2.08 H  (0.52-1.04)  mg/dL


 


POC Glucose (mg/dL)   117 H   (75-99)  mg/dL


 


HDL Cholesterol    22 L  (40-60)  mg/dL


 


Urine Appearance  Cloudy H    (Clear)  


 


Uric Acid Crystals  Occasional H    (None)  /hpf


 


Hyaline Casts  3 H    (0-2)  /lpf


 


Urine Mucus  Rare H    (None)  /hpf














  05/01/20 Range/Units





  08:24 


 


WBC  11.4 H  (3.8-10.6)  k/uL


 


RBC  3.75 L  (3.80-5.40)  m/uL


 


Hgb  9.5 L  (11.4-16.0)  gm/dL


 


Hct  32.4 L  (34.0-46.0)  %


 


MCHC  29.2 L  (31.0-37.0)  g/dL


 


RDW  17.2 H  (11.5-15.5)  %


 


Plt Count  77 L  (150-450)  k/uL


 


Neutrophils #  9.9 H  (1.3-7.7)  k/uL


 


Lymphocytes #  0.7 L  (1.0-4.8)  k/uL


 


Potassium   (3.5-5.1)  mmol/L


 


BUN   (7-17)  mg/dL


 


Creatinine   (0.52-1.04)  mg/dL


 


POC Glucose (mg/dL)   (75-99)  mg/dL


 


HDL Cholesterol   (40-60)  mg/dL


 


Urine Appearance   (Clear)  


 


Uric Acid Crystals   (None)  /hpf


 


Hyaline Casts   (0-2)  /lpf


 


Urine Mucus   (None)  /hpf








                      Microbiology - Last 24 Hours (Table)











 04/30/20 14:07 Gram Stain - Preliminary





 Ascites Fluid Body Fluid Culture - Preliminary


 


 04/30/20 12:55 Anaerobic Culture - Preliminary





 Paracentesis Fluid 


 


 04/28/20 19:10 Blood Culture - Preliminary





 Blood    No Growth after 48 hours














Assessment and Plan


Plan: 





Impression and plan:


#1 Shortness of breath secondary to diastolic congestive heart failure, acute on

chronic, possible pneumonia.    


Bilateral infiltrates, negative covid 19, although this cannot be completely 

ruled out.


#2 Acute on chronic renal failure


#3 Ongoing ascites


#4 Metabolic encephalopathy related mostly to her underlying nonalcoholic liver 

cirrhosis, possible sepsis 


 #5 Paroxysmal atrial fibrillation


 #6 History of chronic liver disease and nonalcoholic cirrhosis of the liver


 #7History of recurrent ascites


 #8 History of vancomycin-resistant enterococcal infection


 #9 History of depression


 #10Obesity with body index of 32.3.


 #11 History of recurrent urinary tract infections.


#12 diabetes 


 #13 hypertension 


 #14 hyperlipidemia 


 #15 abnormal stress test in March of this year, suggesting possible underlying 

coronary artery disease and plan








Plan 


Patient again underwent a successful paracentesis this morning under ultrasound 

guidance, 2.5 L of straw-colored fluid removed.  We will discontinue her oral 

diuretics and start her on IV diuretics 3 times a day today.  Continue to 

monitor her intake and output along with daily weights and daily lytes BUN and 

creatinine.  We will also review her chest x-ray which was performed today.





DNP note has been reviewed, I agree with a documented findings and plan of care.

 Patient was seen and examined.

## 2020-05-02 LAB
ANION GAP SERPL CALC-SCNC: 11 MMOL/L
APTT BLD: 31.5 SEC (ref 22–30)
BUN SERPL-SCNC: 77 MG/DL (ref 7–17)
CALCIUM SPEC-MCNC: 8.3 MG/DL (ref 8.4–10.2)
CHLORIDE SERPL-SCNC: 106 MMOL/L (ref 98–107)
CO2 SERPL-SCNC: 20 MMOL/L (ref 22–30)
ERYTHROCYTE [DISTWIDTH] IN BLOOD BY AUTOMATED COUNT: 3.24 M/UL (ref 3.8–5.4)
ERYTHROCYTE [DISTWIDTH] IN BLOOD BY AUTOMATED COUNT: 3.49 M/UL (ref 3.8–5.4)
ERYTHROCYTE [DISTWIDTH] IN BLOOD: 17.4 % (ref 11.5–15.5)
ERYTHROCYTE [DISTWIDTH] IN BLOOD: 17.7 % (ref 11.5–15.5)
GLUCOSE BLD-MCNC: 140 MG/DL (ref 75–99)
GLUCOSE BLD-MCNC: 201 MG/DL (ref 75–99)
GLUCOSE BLD-MCNC: 208 MG/DL (ref 75–99)
GLUCOSE BLD-MCNC: 213 MG/DL (ref 75–99)
GLUCOSE BLD-MCNC: 219 MG/DL (ref 75–99)
GLUCOSE SERPL-MCNC: 135 MG/DL (ref 74–99)
HCT VFR BLD AUTO: 27.7 % (ref 34–46)
HCT VFR BLD AUTO: 29.5 % (ref 34–46)
HGB BLD-MCNC: 7.8 GM/DL (ref 11.4–16)
HGB BLD-MCNC: 8.6 GM/DL (ref 11.4–16)
HYALINE CASTS UR QL AUTO: 30 /LPF (ref 0–2)
INR PPP: 2.2 (ref ?–1.2)
IRON SERPL-MCNC: 15 UG/DL (ref 50–170)
MCH RBC QN AUTO: 24.1 PG (ref 25–35)
MCH RBC QN AUTO: 24.6 PG (ref 25–35)
MCHC RBC AUTO-ENTMCNC: 28.2 G/DL (ref 31–37)
MCHC RBC AUTO-ENTMCNC: 29.1 G/DL (ref 31–37)
MCV RBC AUTO: 84.4 FL (ref 80–100)
MCV RBC AUTO: 85.4 FL (ref 80–100)
PH UR: 5.5 [PH] (ref 5–8)
PLATELET # BLD AUTO: 90 K/UL (ref 150–450)
PLATELET # BLD AUTO: 92 K/UL (ref 150–450)
POTASSIUM SERPL-SCNC: 4.6 MMOL/L (ref 3.5–5.1)
PROT UR QL: (no result)
PT BLD: 21.1 SEC (ref 9–12)
RBC UR QL: >182 /HPF (ref 0–5)
SODIUM SERPL-SCNC: 137 MMOL/L (ref 137–145)
SP GR UR: 1.02 (ref 1–1.03)
SQUAMOUS UR QL AUTO: 2 /HPF (ref 0–4)
TIBC SERPL-MCNC: 259 UG/DL (ref 228–460)
UROBILINOGEN UR QL STRIP: <2 MG/DL (ref ?–2)
WBC # BLD AUTO: 12.8 K/UL (ref 3.8–10.6)
WBC # BLD AUTO: 13.6 K/UL (ref 3.8–10.6)
WBC # UR AUTO: 64 /HPF (ref 0–5)

## 2020-05-02 RX ADMIN — GABAPENTIN SCH MG: 100 CAPSULE ORAL at 21:56

## 2020-05-02 RX ADMIN — MIDODRINE HYDROCHLORIDE SCH MG: 5 TABLET ORAL at 18:09

## 2020-05-02 RX ADMIN — ALBUMIN HUMAN SCH MLS/HR: 0.25 SOLUTION INTRAVENOUS at 18:46

## 2020-05-02 RX ADMIN — APIXABAN SCH MG: 2.5 TABLET, FILM COATED ORAL at 09:16

## 2020-05-02 RX ADMIN — PANTOPRAZOLE SODIUM SCH MG: 40 TABLET, DELAYED RELEASE ORAL at 06:15

## 2020-05-02 RX ADMIN — DILTIAZEM HYDROCHLORIDE SCH MLS/HR: 5 INJECTION INTRAVENOUS at 09:45

## 2020-05-02 RX ADMIN — CEFEPIME HYDROCHLORIDE SCH MLS/HR: 2 INJECTION, POWDER, FOR SOLUTION INTRAVENOUS at 06:14

## 2020-05-02 RX ADMIN — Medication SCH MG: at 09:17

## 2020-05-02 RX ADMIN — CEFAZOLIN SCH MLS/HR: 330 INJECTION, POWDER, FOR SOLUTION INTRAMUSCULAR; INTRAVENOUS at 21:57

## 2020-05-02 RX ADMIN — CYANOCOBALAMIN TAB 500 MCG SCH MCG: 500 TAB at 09:16

## 2020-05-02 RX ADMIN — ALBUMIN HUMAN SCH MLS/HR: 0.25 SOLUTION INTRAVENOUS at 18:12

## 2020-05-02 RX ADMIN — ALBUMIN HUMAN SCH MLS/HR: 0.25 SOLUTION INTRAVENOUS at 20:34

## 2020-05-02 RX ADMIN — FLUDROCORTISONE ACETATE SCH MG: 0.1 TABLET ORAL at 09:18

## 2020-05-02 RX ADMIN — FLUDROCORTISONE ACETATE SCH MG: 0.1 TABLET ORAL at 21:56

## 2020-05-02 RX ADMIN — CEFAZOLIN SCH MLS/HR: 330 INJECTION, POWDER, FOR SOLUTION INTRAMUSCULAR; INTRAVENOUS at 18:12

## 2020-05-02 RX ADMIN — SERTRALINE HYDROCHLORIDE SCH MG: 50 TABLET, FILM COATED ORAL at 09:16

## 2020-05-02 RX ADMIN — ASPIRIN 81 MG CHEWABLE TABLET SCH MG: 81 TABLET CHEWABLE at 09:16

## 2020-05-02 RX ADMIN — Medication SCH UNIT: at 09:17

## 2020-05-02 RX ADMIN — ALBUMIN HUMAN SCH MLS/HR: 0.25 SOLUTION INTRAVENOUS at 18:34

## 2020-05-02 RX ADMIN — LACTULOSE SCH GM: 20 SOLUTION ORAL at 09:17

## 2020-05-02 RX ADMIN — FUROSEMIDE SCH MG: 10 INJECTION, SOLUTION INTRAMUSCULAR; INTRAVENOUS at 09:16

## 2020-05-02 RX ADMIN — METOPROLOL TARTRATE SCH MG: 25 TABLET, FILM COATED ORAL at 06:41

## 2020-05-02 RX ADMIN — ALBUMIN HUMAN SCH MLS/HR: 0.25 SOLUTION INTRAVENOUS at 20:19

## 2020-05-02 RX ADMIN — ALBUMIN HUMAN SCH MLS/HR: 0.25 SOLUTION INTRAVENOUS at 18:36

## 2020-05-02 RX ADMIN — ALBUMIN HUMAN SCH MLS/HR: 0.25 SOLUTION INTRAVENOUS at 18:11

## 2020-05-02 RX ADMIN — METOPROLOL TARTRATE SCH MG: 25 TABLET, FILM COATED ORAL at 21:56

## 2020-05-02 NOTE — PN
PROGRESS NOTE



DATE OF SERVICE:

05/02/2020



This 79-year-old woman is admitted with change in mental status, has got CHF 
also.  The

possibility of COVID-19 associated pneumonia is also suspected.  The patient 
also had

significant ascites which was tapped.  The patient remains to be confused.  
Cultures

are negative so far.  WBC 13.6, hemoglobin is 8.6.  Some leaking at the site of 
the

ascitic tap is also noted.  Creatinine is worsening 2.39 today.  Sugars are 
monitored

closely.  The urine is slightly dark in the Lobo catheter.



PAST MEDICAL HISTORY:

Reviewed.



REVIEW OF SYSTEMS:

CARDIOVASCULAR: No angina or palpitations.

RESPIRATORY:  As mentioned earlier.

GI:  As mentioned earlier.

: No dysuria.

CENTRAL NERVOUS SYSTEM: No numbness or weakness.



CURRENT MEDICATIONS:

Reviewed and include:

1. Tylenol p.r.n.

2. Norco 5 mg q.6h.

3. Xanax 0.5 daily.

4. Eliquis 2.5 mg b.i.d.

5. Aspirin 81 mg.

6. Cefepime 2 g IV daily.

7. Vitamin D3.

8. Vitamin B12.

9. Cardizem drip previously.

10.Florinef 0.2 b.i.d.

11.Lasix 40 mg IV b.i.d.

13.Magnesium oxide.

14.Lopressor.

15.Narcan.

16.Nitrostat.

17.Zoloft.



PHYSICAL EXAM:

Patient is alert, oriented x3.  Pulse is 74, blood pressure 140/82, respiration 
20,

temperature 97.4, pulse ox 97% on room air.

HEENT: Conjunctivae normal.

NECK: No JVD.

CARDIOVASCULAR: S1, S2 muffled.

RESPIRATION: Breath sounds diminished in  the bases.  A few scattered rhonchi 
and

crackles.

ABDOMEN:  Soft, obese.  Ascites present.  Some leaking also noted on the right 
side as

mentioned earlier.

LEGS: There is bilateral leg edema.

NERVOUS SYSTEM:  Diffusely weak.  Otherwise, ecchymoses around the face and 
periorbital

area also present.



The CT scan not show any obvious fractures.



LABS:

At this time shows WBC 13.2, hemoglobin is 8.6 and sodium 137, potassium 4.6.



ASSESSMENT:

1. Shortness of breath, multifactorial, possible bilateral pneumonia possibly

    community-acquired, possibly Covid-19  associated pneumonia.

2. Congestive heart failure acute exacerbation with acute on chronic diastolic

    dysfunction ejection fraction 50% to 60% with suspected Covid-19 but however
test

    is negative.

3. Change in mental status, metabolic encephalopathy, acute on chronic.

4. Hyperkalemia secondary to renal failure.

5. Acute renal failure with acute tubular necrosis.  Baseline chronic kidney 
stage 3

    possibly.

6. Elevated lactic acid possibly secondary sepsis, present on admission 
secondary to

    pneumonia.

7. Troponin 0.06 indeterminate.

8. Hypoalbuminemia.

9. Hyponatremia.

10.Change in mental status, as mentioned earlier.

11.Paroxysmal atrial fibrillation on Eliquis.

12.Generalized gait dysfunction.

13.Increased WBC.

14.Anemia, normocytic.

15.History of multiple falls recently.

16.History of congestive heart failure.

17.History of diabetes type 2.

18.Hypertension.

19.History of chronic liver disease and nonalcoholic cirrhosis of the liver with

    history of multiple abdominal paracentesis.

20.History of urinary tract infection.

21.History of recurrent ascites and paracentesis.

22.History of thrombocytopenia.

23.History of left breast cancer with lumpectomy.

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

25.History of depression.

26.History of cholecystectomy.

27.History of obesity with body mass index of 32.6.

28.FULL CODE.



RECOMMENDATIONS AND DISCUSSION:

I recommend to continue current medications, management and symptomatic 
treatment.

White count is slightly elevated.  The patient is on IV antibiotics.  Infectious

Disease following the patient closely.  Creatinine has slightly worsened to 
2.39.

Recommend Nephrology evaluation.  Continue to monitor.  Otherwise, white count 
is 13.6,

hemoglobin is 8.6.  Continue the rest of medications.  We will check a UA for 
the

possibility of any hematuria or any infection.  Once again, the prognosis 
guarded

because of multiple complex medical issues.  Further recommendations to follow.





MMODL / IJN: [id number] / Job#: [id number]

KRANTHI

## 2020-05-02 NOTE — CT
EXAMINATION TYPE: CT facial bones wo con

 

DATE OF EXAM: 5/2/2020

 

COMPARISON: None

 

HISTORY: FALL WITH FACIAL INJURY AND BRUISING

 

CT DLP: 878.9 mGycm

Automated exposure control for dose reduction was used.

 

The orbital margins are intact. There is no evidence of a blowout fracture. There is some mild bilate
ral mucosal thickening in the maxillary sinuses. There is bilateral patency of the ostiomeatal comple
x. There is no evidence of retro-orbital mass. Maxilla is intact. The mandibular ring is intact. Lore
ibular joints are intact. Zygomatic arches appear normal. Nasal bone is intact. The submandibular sal
ivary glands are symmetric. Parotid glands are symmetric. There is normal aeration of the temporal gabe
zurdo.

 

IMPRESSION:

No fracture seen. Minimal maxillary sinusitis.

## 2020-05-02 NOTE — PN
PROGRESS NOTE



DATE OF SERVICE:

05/02/2020



REASON FOR FOLLOWUP:

Possible pneumonia.



INTERVAL HISTORY:

The patient was noticed to have some changes in her mental status.  She was 
noted to be

slightly more weak and lethargic today.  However, the patient is afebrile.  The 
patient

did respond to her name and open her eyes and try to mumble or communicate but 
hard to

understand.  Asked specifically for any headache, no response.  No other changes

reported.



PHYSICAL EXAMINATION:

Blood pressure 100/42 with a pulse of 74, temperature is 97.5.  She is 97% on 3 
L nasal

cannula. General description is an elderly female lying in bed in no distress.

Respiratory system: Unlabored breathing, decreased breath sounds in the base, no

wheeze.  Heart S1, S2.  Regular rate and rhythm. Abdomen soft, no tenderness.



LABS:

Hemoglobin 8.8, white count of 8.3, BUN of 77, creatinine is 2.39.  Peritoneal 
fluid

culture so far negative.  Blood culture negative.



DIAGNOSTIC IMPRESSION AND PLAN:

Patient admitted to the hospital with shortness of breath, multifactorial with

abnormality on x-ray, could be fluid related.  The patient did have evidence of 
fluid

overload and ascites status post paracentesis.  Fluid culture so far negative.

Lethargic related to possible metabolic encephalopathy, clinically doubt or 
encephalitis

in a patient with no fever or elevated white count.  Continue empiric cefepime 
and

monitor clinical course closely.





MMODL / IJN: [id number] / Job#: [id number]

MTDD

## 2020-05-02 NOTE — P.PN
Subjective


Progress Note Date: 05/02/20





This is a 79-year-old  female with past medical history significant for

paroxysmal atrial fibrillation, on long-term anticoagulation, hypertension, 

hyperlipidemia, diabetes, nonalcoholic cirrhosis, coronary artery disease.  

Patient had an abnormal stress test in March of this year and at that time was 

recommended to undergo cardiac catheterization.  Unfortunately she became 

medically unstable and therefore the cardiac catheterization had not been 

performed.  She follows with Dr. Puga in the office.  Echocardiogram with 

Doppler study performed in March of this year revealed a normal left ventricular

systolic function.  Patient presents to the hospital on this occasion with 

symptoms of 2-3 day duration of progressively worsening shortness of breath.  

Her EKG on admission showed atrial fibrillation with a rapid ventricular 

response, heart rate 148, subsequent EKG showed normal sinus rhythm.  Patient 

remains in a normal sinus rhythm this morning.  Subsequent chest x-ray was also 

performed, which revealed congestive heart failure.  Her initial chest x-ray 

showed diffuse patchy infiltrates in the right lower lobe.  Temperature 97.7, 

blood pressure 128/58 with a heart rate in the 70s, 99% on 2 L of oxygen.  White

blood cell count 12.9, hemoglobin 10.0, platelet count 153.  Sodium 136, 

potassium 4.6, BUN 83, and creatinine 2.1 yesterday, 84 and 2.1 today.  

Coronavirus testing and negative, pro calcitonin 0.38, BNP level 13,800, 

troponin 0.069.At the time of her examination by Dr. Escalante, patient was not in

any acute distress, still complaining of some mild shortness of breath however.





04/30/2020


Patient was seen and examined this morning, she does feel as though her breathi

ng is improving, her weight is down 3 kg today.  Blood pressure 126/40 with a 

heart rate in the 70s, 95% on 3 L of oxygen.  White blood cell count 11.4, 

hemoglobin 9.2, platelet count 132.  Sodium 138, potassium 4.6, BUN 81, 

creatinine 2.0.  Patient is currently on oral diuretics.  We will give her one 

time dose of additional 40 mg IV Lasix today.  Check her labs in the morning.








05/01/2020


Patient was seen and examined this morning by Dr. Escalante, continues to feel 

short of breath.  Blood pressure 102/50 with a heart rate in the 60s, 94% on 4 L

of oxygen.  A repeat chest x-ray was performed this morning and continues to be 

pending.  Patient again underwent successful paracentesis under ultrasound 

guidance this morning, for 2.5 L of straw-colored fluid.  White blood cell count

11.4, hemoglobin 9.5, platelet count 77.  Sodium 140, potassium 5.8, BUN 81 and 

creatinine 2.0.  We will start the patient today on a twice a day dose of IV 

Lasix, hold the oral diuretics for now.  Check lytes BUN and creatinine in the 

morning.








05/02/2020





Patient seen and examined this morning, overall states that she feels her 

breathing is mildly improved.  Blood pressure 100/60 with a heart rate in the 

70s, earlier this morning patient went into a rapid A. fib with a heart rate in 

the 150 range, we did initiate an IV Cardizem bolus with subsequent drip.  White

blood cell count 13.6, hematoma and 8.6, platelet count 90.  Sodium 137, 

potassium 4.6, BUN 77 and creatinine 2.3.








Objective





- Vital Signs


Vital signs: 


                                   Vital Signs











Temp  97.5 F L  05/02/20 10:43


 


Pulse  74   05/02/20 10:43


 


Resp  20   05/02/20 10:43


 


BP  100/42   05/02/20 10:43


 


Pulse Ox  97   05/02/20 10:43








                                 Intake & Output











 05/01/20 05/02/20 05/02/20





 18:59 06:59 18:59


 


Output Total  700 


 


Balance  -700 


 


Weight  94 kg 


 


Output:   


 


  Urine  700 


 


Other:   


 


  Voiding Method Bedpan Bedpan 





 Diaper Diaper 


 


  # Bowel Movements 1  














- Exam





Physical Exam: Revealed 79-year-old female in no distress, not a great hi

storian.


Head: Atraumatic, normocephalic.


HEENT:Neck is supple.] [No neck masses.No thyromegaly.No JVD.


Chest: [Diminished breath sounds at the bases bilaterally ,no rhonchi or 

wheezes.]


Cardiac Exam: [Normal S1 and S2, no S3 gallop, no murmur.]


Abdomen: [Large abdomen,  ascites, no tenderness, abdominal wall edema is 

noted.]


Extremities: [No clubbing, 1+ bipedal edema, no cyanosis.]  Chronic venous 

stasis changes.


Neurological Exam: Confused, oriented 2.


Psychiatric: Depressed mood, blunt affect, poor mental status.


Skin: No rashes.  Chronic venous stasis changes noted in lower extremities.








- Labs


CBC & Chem 7: 


                                 05/02/20 10:00





                                 05/02/20 10:00


Labs: 


                  Abnormal Lab Results - Last 24 Hours (Table)











  05/01/20 05/01/20 05/01/20 Range/Units





  06:38 16:33 20:10 


 


WBC     (3.8-10.6)  k/uL


 


RBC     (3.80-5.40)  m/uL


 


Hgb     (11.4-16.0)  gm/dL


 


Hct     (34.0-46.0)  %


 


MCH     (25.0-35.0)  pg


 


MCHC     (31.0-37.0)  g/dL


 


RDW     (11.5-15.5)  %


 


Plt Count     (150-450)  k/uL


 


Carbon Dioxide     (22-30)  mmol/L


 


BUN     (7-17)  mg/dL


 


Creatinine     (0.52-1.04)  mg/dL


 


Glucose     (74-99)  mg/dL


 


POC Glucose (mg/dL)   210 H  239 H  (75-99)  mg/dL


 


Calcium     (8.4-10.2)  mg/dL


 


Vitamin B12  1611.0 H    (200.0-944.0)  pg/mL














  05/02/20 05/02/20 05/02/20 Range/Units





  05:59 10:00 10:00 


 


WBC   13.6 H   (3.8-10.6)  k/uL


 


RBC   3.49 L   (3.80-5.40)  m/uL


 


Hgb   8.6 L   (11.4-16.0)  gm/dL


 


Hct   29.5 L   (34.0-46.0)  %


 


MCH   24.6 L   (25.0-35.0)  pg


 


MCHC   29.1 L   (31.0-37.0)  g/dL


 


RDW   17.4 H   (11.5-15.5)  %


 


Plt Count   90 L   (150-450)  k/uL


 


Carbon Dioxide    20 L  (22-30)  mmol/L


 


BUN    77 H  (7-17)  mg/dL


 


Creatinine    2.39 H  (0.52-1.04)  mg/dL


 


Glucose    135 H  (74-99)  mg/dL


 


POC Glucose (mg/dL)  208 H    (75-99)  mg/dL


 


Calcium    8.3 L  (8.4-10.2)  mg/dL


 


Vitamin B12     (200.0-944.0)  pg/mL














  05/02/20 Range/Units





  11:12 


 


WBC   (3.8-10.6)  k/uL


 


RBC   (3.80-5.40)  m/uL


 


Hgb   (11.4-16.0)  gm/dL


 


Hct   (34.0-46.0)  %


 


MCH   (25.0-35.0)  pg


 


MCHC   (31.0-37.0)  g/dL


 


RDW   (11.5-15.5)  %


 


Plt Count   (150-450)  k/uL


 


Carbon Dioxide   (22-30)  mmol/L


 


BUN   (7-17)  mg/dL


 


Creatinine   (0.52-1.04)  mg/dL


 


Glucose   (74-99)  mg/dL


 


POC Glucose (mg/dL)  140 H  (75-99)  mg/dL


 


Calcium   (8.4-10.2)  mg/dL


 


Vitamin B12   (200.0-944.0)  pg/mL








                      Microbiology - Last 24 Hours (Table)











 04/28/20 19:10 Blood Culture - Preliminary





 Blood    No Growth after 72 hours


 


 04/30/20 14:07 Gram Stain - Preliminary





 Ascites Fluid Body Fluid Culture - Preliminary














Assessment and Plan


Plan: 





Impression and plan:


#1 Shortness of breath secondary to diastolic congestive heart failure, acute on

chronic, possible pneumonia.    


Bilateral infiltrates, negative covid 19, although this cannot be completely 

ruled out.


#2 Acute on chronic renal failure


#3 Ongoing ascites


#4 Metabolic encephalopathy related mostly to her underlying nonalcoholic liver 

cirrhosis, possible sepsis 


 #5 Paroxysmal atrial fibrillation


 #6 History of chronic liver disease and nonalcoholic cirrhosis of the liver


 #7History of recurrent ascites


 #8 History of vancomycin-resistant enterococcal infection


 #9 History of depression


 #10Obesity with body index of 32.3.


 #11 History of recurrent urinary tract infections.


#12 diabetes 


 #13 hypertension 


 #14 hyperlipidemia 


 #15 abnormal stress test in March of this year, suggesting possible underlying 

coronary artery disease and plan








Plan 


We will continue the IV Cardizem drip for 24 hours, hopefully tomorrow we will 

be able to discontinue the drip, and continue her oral medications.


DNP note has been reviewed, I agree with a documented findings and plan of care.

 Patient was seen and examined.

## 2020-05-02 NOTE — P.PN
Subjective


Progress Note Date: 05/02/20


Principal diagnosis: 





Acute exacerbation of diastolic congestive heart failure





This is a 79-year-old female, familiar to my service, I have seen this patient 

before for ascites, congestive heart failure, liver cirrhosis, mental status 

changes related to hyperammonemia, related to underlying nonalcoholic liver 

cirrhosis.  Patient is also known to have history of diabetes, hypertension, 

seen few weeks ago for falls and weakness, and at the time she had significant 

ascites and significantly elevated ammonia level, underwent large-volume 

paracentesis, developed hypotension, treated mostly with volume replacement, and

she went on to develop fluid overload and diastolic congestive heart failure.  

Patient also had history of paroxysmal atrial fibrillation.  Patient was eventua

lly discharged to medical [facility], and apparently over the last few days the

patient has been generally weak, complaining of shortness of breath, sent back 

to [facility], and her chest x-ray is showing bilateral 

infiltrates.  It is not clear whether the findings are findings of pneumonia or 

findings of diastolic congestive heart failure.  Patient was placed on 

antibiotics empirically, her covid 19 screening was negative, however it is not 

entirely ruled out.  During my evaluation, patient was noted to be on room air, 

O2 saturation is 92% on room air.  She was noted to be in no form of distress.  

And she was already started empirically on antibiotics by the admitting phys

brooke.  Patient was also placed on heparin for paroxysmal atrial fibrillation.  

On physical examination, patient was noted to have significant enlargement of 

her abdominal girth, and I recommended ultrasound of the abdomen patient may 

require repeat paracentesis.  In the meantime I recommended that we continue 

antibiotics, consider a trial of diuretics.  And I have ordered a serum pro 

calcitonin, and I have noticed that her BNP level is significantly elevated.  

Her pro calcitonin is not suggestive of underlying bacterial pneumonia.  It is 

minimally elevated.  Patient is not the greatest historian.  Could not tell me 

exactly why she was sent to the hospital.





On 04/30/2020 patient seen in follow-up on selective care unit, is lethargic, 

but no acute distress, she is on 4 L of oxygen the pulse ox 100%, afebrile, 

hemodynamically stable.  We requested to have interventional radiology do a par

acentesis on her, and ascites fluid will be sent for cultures, cell count with 

differential.  She is on empiric antibiotics in the form of cefepime and 

vancomycin.  he was given a dose of IV Lasix and she is maintained on oral Lasix

of 40 mg twice daily.  Blood culture has been negative.  Today's labs have been 

reviewed showing white blood cell count of 11.4, hemoglobin of 9.2, electrolytes

were within normal limits, profile relatively stable with BUN of 81 creatinine 

is 2.08.  She is on heparin infusion for history of atrial fibrillation which is

currently on hold for paracentesis.  Lung sounds are diminished at the bases, 

abdomen significantly distended suspect large amount of ascites





On 05/01/2020 patient seen in follow-up on selective care unit, she is status 

post paracentesis with removal of 2.5 liters of ascitic fluid.  Ascites fluid 

cultures are pending at this time, cell count showed LVH of 65, glucose of 108, 

fluid Analy nuclear WBCs were 10, and fluid mononuclear WBCs were 90.  More 

consistent with a picture of CHF, liver cirrhosis, and not infection.  Patient 

remains on combination of antibiotics of cefepime and vancomycin.  She is more 

awake and alert on today's exam.  She is going for brain MRI today she was seen 

by neurology in consultation for episode of altered mental status, with 

alteration in her speech and confusion.  Brain CT was obtained yesterday showing

no acute intracranial process, mild to moderate diffuse cerebral atrophy and 

chronic small vessel ischemic changes.  Signs are stable, no worsening dyspnea, 

she is on 4 L of oxygen the pulse ox of %.  She's been afebrile.  Today's 

chest x-ray reviewed showing new multifocal patchy opacities likely related to 

mild pulmonary vascular congestion and possibility of pneumonia is not excluded.





The patient is seen today 05/02/2020 in follow-up on the selective care unit.  

She is currently resting comfortably in bed.  Awake and alert in no acute 

distress.  He is maintaining O2 saturations in the 90s on 3 L/m per nasal 

cannula.  She's been afebrile.  Hemodynamically stable.  Blood culture reveals 

no growth.  Paracentesis fluid at this preliminary no growth.  White count 13.6.

 Hemoglobin 8.6.  Platelet count 90,000.  Sodium 137.  Potassium 4.6.  Bicarb 

20.  Creatinine 2.39.  She is continued on cefepime.  Cardizem drip at 5 mg per 

hour.  IV Lasix 40 mg every 12 hours.  No accurate I&O.  Patient is incontinent.

 Weight 94 kg.  MRI of the brain revealed no acute infarct, midline shift or 

mass effect.  EEG is consistent with generalized diffuse cerebral dysfunction 

most often seen in encephalopathic states.





Objective





- Vital Signs


Vital signs: 


                                   Vital Signs











Temp  97.5 F L  05/02/20 10:43


 


Pulse  74   05/02/20 10:43


 


Resp  20   05/02/20 10:43


 


BP  100/42   05/02/20 10:43


 


Pulse Ox  97   05/02/20 10:43








                                 Intake & Output











 05/01/20 05/02/20 05/02/20





 18:59 06:59 18:59


 


Output Total  700 


 


Balance  -700 


 


Weight  94 kg 


 


Output:   


 


  Urine  700 


 


Other:   


 


  Voiding Method Bedpan Bedpan 





 Diaper Diaper 


 


  # Bowel Movements 1  














- Exam





GENERAL EXAM: pleasant, 79-year-old much more awake and today's exam white 

female, on 3 L of oxygen with pulse ox 97% comfortable in no apparent distress.


HEAD: Normocephalic/atraumatic.


EYES: Normal reaction of pupils, equal size.  Conjunctiva pink, sclera white.


NOSE: Clear with pink turbinates.


THROAT: No erythema or exudates.


NECK: No masses, no JVD, no thyroid enlargement, no adenopathy.


CHEST: No chest wall deformity.  Symmetrical expansion. 


LUNGS: Equal air entry with bilateral scattered rhonchi.


CVS: Regular rate and rhythm, normal S1 and S2, no gallops, no murmurs, no rubs


ABDOMEN: Soft, large distended.  No hepatosplenomegaly, normal bowel sounds, no 

guarding or rigidity.


EXTREMITIES: Cellulitis lower extremity, 1+ edema, 2+ pulses and upper and lower

extremities.


MUSCULOSKELETAL: Muscle strength and tone normal.


SPINE: No scoliosis or deformity


SKIN: No rashes


CENTRAL NERVOUS SYSTEM: Alert and oriented -2.  No focal deficits, tone is 

normal in all 4 extremities.


PSYCHIATRIC: Alert and oriented -2.  Appropriate affect.  Intact judgment and 

insight.





- Labs


CBC & Chem 7: 


                                 05/02/20 10:00





                                 05/02/20 10:00


Labs: 


                  Abnormal Lab Results - Last 24 Hours (Table)











  05/01/20 05/01/20 05/01/20 Range/Units





  06:38 16:33 20:10 


 


WBC     (3.8-10.6)  k/uL


 


RBC     (3.80-5.40)  m/uL


 


Hgb     (11.4-16.0)  gm/dL


 


Hct     (34.0-46.0)  %


 


MCH     (25.0-35.0)  pg


 


MCHC     (31.0-37.0)  g/dL


 


RDW     (11.5-15.5)  %


 


Plt Count     (150-450)  k/uL


 


Carbon Dioxide     (22-30)  mmol/L


 


BUN     (7-17)  mg/dL


 


Creatinine     (0.52-1.04)  mg/dL


 


Glucose     (74-99)  mg/dL


 


POC Glucose (mg/dL)   210 H  239 H  (75-99)  mg/dL


 


Calcium     (8.4-10.2)  mg/dL


 


Vitamin B12  1611.0 H    (200.0-944.0)  pg/mL














  05/02/20 05/02/20 05/02/20 Range/Units





  05:59 10:00 10:00 


 


WBC   13.6 H   (3.8-10.6)  k/uL


 


RBC   3.49 L   (3.80-5.40)  m/uL


 


Hgb   8.6 L   (11.4-16.0)  gm/dL


 


Hct   29.5 L   (34.0-46.0)  %


 


MCH   24.6 L   (25.0-35.0)  pg


 


MCHC   29.1 L   (31.0-37.0)  g/dL


 


RDW   17.4 H   (11.5-15.5)  %


 


Plt Count   90 L   (150-450)  k/uL


 


Carbon Dioxide    20 L  (22-30)  mmol/L


 


BUN    77 H  (7-17)  mg/dL


 


Creatinine    2.39 H  (0.52-1.04)  mg/dL


 


Glucose    135 H  (74-99)  mg/dL


 


POC Glucose (mg/dL)  208 H    (75-99)  mg/dL


 


Calcium    8.3 L  (8.4-10.2)  mg/dL


 


Vitamin B12     (200.0-944.0)  pg/mL














  05/02/20 Range/Units





  11:12 


 


WBC   (3.8-10.6)  k/uL


 


RBC   (3.80-5.40)  m/uL


 


Hgb   (11.4-16.0)  gm/dL


 


Hct   (34.0-46.0)  %


 


MCH   (25.0-35.0)  pg


 


MCHC   (31.0-37.0)  g/dL


 


RDW   (11.5-15.5)  %


 


Plt Count   (150-450)  k/uL


 


Carbon Dioxide   (22-30)  mmol/L


 


BUN   (7-17)  mg/dL


 


Creatinine   (0.52-1.04)  mg/dL


 


Glucose   (74-99)  mg/dL


 


POC Glucose (mg/dL)  140 H  (75-99)  mg/dL


 


Calcium   (8.4-10.2)  mg/dL


 


Vitamin B12   (200.0-944.0)  pg/mL








                      Microbiology - Last 24 Hours (Table)











 04/28/20 19:10 Blood Culture - Preliminary





 Blood    No Growth after 72 hours


 


 04/30/20 14:07 Gram Stain - Preliminary





 Ascites Fluid Body Fluid Culture - Preliminary














Assessment and Plan


Assessment: 





Acute hypoxic respiratory failure secondary to an acute exacerbation of chronic 

diastolic congestive heart failure, doubt bacterial pneumonia.  Pro calcitonin 

level is not significantly enlarged.  However her BNP level is significantly 

high.





Bilateral infiltrates, negative covid 19, but I don't believe is entirely ruled 

out.





Acute on chronic renal failure





Suspect ongoing ascites, doubt spontaneous bacterial peritonitis, physical 

examination of the abdomen is rather benign and abdomen is nontender.





Suspect ongoing metabolic encephalopathy related mostly to her underlying 

nonalcoholic liver cirrhosis, doubt sepsis at this point.  Again that is not 

entirely ruled out.





Paroxysmal atrial fibrillation





History of chronic liver disease and nonalcoholic cirrhosis of the liver





History of recurrent ascites





History of vancomycin-resistant enterococcal infection





History of depression





Obesity with body index of 32.3.





History of recurrent urinary tract infections.





Altered mental status, rule out possibility of acute ischemic infarct, brain CT 

showed no acute intracranial process, brain MRI is pending, EEGs pending.  

Neurology service is following





Plan:





The patient was seen and evaluated by Dr. Ceballos.


MRI of the brain and EEG reviewed


Neurology is on the case


Continue with cefepime


Continue IV diuretics


Repeat chest x-ray on 05/04/2020


Titrate down the FiO2 as tolerated


We will continue to follow





I, the cosigning physician, performed a history & physical examination of the 

patient. Lungs sounds with bilateral scattered rhonchi.  Maintaining good O2 

saturations in the 90s on 3 L/m per nasal cannula.  I discussed the assessment 

and plan of care with my nurse practitioner, Michelle Marcial. I attest to the above 

note as dictated by her.

## 2020-05-03 VITALS — DIASTOLIC BLOOD PRESSURE: 70 MMHG | SYSTOLIC BLOOD PRESSURE: 102 MMHG

## 2020-05-03 LAB
ANION GAP SERPL CALC-SCNC: 16 MMOL/L
BASOPHILS # BLD AUTO: 0 K/UL (ref 0–0.2)
BASOPHILS NFR BLD AUTO: 0 %
BUN SERPL-SCNC: 83 MG/DL (ref 7–17)
CALCIUM SPEC-MCNC: 9 MG/DL (ref 8.4–10.2)
CHLORIDE SERPL-SCNC: 103 MMOL/L (ref 98–107)
CO2 BLDA-SCNC: 18 MMOL/L (ref 19–24)
CO2 BLDA-SCNC: 19 MMOL/L (ref 19–24)
CO2 SERPL-SCNC: 18 MMOL/L (ref 22–30)
EOSINOPHIL # BLD AUTO: 0.1 K/UL (ref 0–0.7)
EOSINOPHIL NFR BLD AUTO: 1 %
ERYTHROCYTE [DISTWIDTH] IN BLOOD BY AUTOMATED COUNT: 3.16 M/UL (ref 3.8–5.4)
ERYTHROCYTE [DISTWIDTH] IN BLOOD: 17.6 % (ref 11.5–15.5)
GLUCOSE BLD-MCNC: 238 MG/DL (ref 75–99)
GLUCOSE BLD-MCNC: 252 MG/DL (ref 75–99)
GLUCOSE SERPL-MCNC: 211 MG/DL (ref 74–99)
HCO3 BLDA-SCNC: 17 MMOL/L (ref 21–25)
HCO3 BLDA-SCNC: 18 MMOL/L (ref 21–25)
HCT VFR BLD AUTO: 27.1 % (ref 34–46)
HGB BLD-MCNC: 8 GM/DL (ref 11.4–16)
LYMPHOCYTES # SPEC AUTO: 0.9 K/UL (ref 1–4.8)
LYMPHOCYTES NFR SPEC AUTO: 7 %
MCH RBC QN AUTO: 25.3 PG (ref 25–35)
MCHC RBC AUTO-ENTMCNC: 29.6 G/DL (ref 31–37)
MCV RBC AUTO: 85.6 FL (ref 80–100)
MONOCYTES # BLD AUTO: 0.5 K/UL (ref 0–1)
MONOCYTES NFR BLD AUTO: 4 %
NEUTROPHILS # BLD AUTO: 11.6 K/UL (ref 1.3–7.7)
NEUTROPHILS NFR BLD AUTO: 88 %
PCO2 BLDA: 32 MMHG (ref 35–45)
PCO2 BLDA: 37 MMHG (ref 35–45)
PH BLDA: 7.27 [PH] (ref 7.35–7.45)
PH BLDA: 7.35 [PH] (ref 7.35–7.45)
PH UR: 5.5 [PH] (ref 5–8)
PLATELET # BLD AUTO: 96 K/UL (ref 150–450)
PO2 BLDA: 82 MMHG (ref 83–108)
POTASSIUM SERPL-SCNC: 5.2 MMOL/L (ref 3.5–5.1)
PROT UR QL: (no result)
RBC UR QL: >182 /HPF (ref 0–5)
SODIUM SERPL-SCNC: 137 MMOL/L (ref 137–145)
SP GR UR: 1.02 (ref 1–1.03)
UROBILINOGEN UR QL STRIP: <2 MG/DL (ref ?–2)
WBC # BLD AUTO: 13.2 K/UL (ref 3.8–10.6)
WBC # UR AUTO: >182 /HPF (ref 0–5)

## 2020-05-03 PROCEDURE — 5A09357 ASSISTANCE WITH RESPIRATORY VENTILATION, LESS THAN 24 CONSECUTIVE HOURS, CONTINUOUS POSITIVE AIRWAY PRESSURE: ICD-10-PCS

## 2020-05-03 PROCEDURE — 03HY32Z INSERTION OF MONITORING DEVICE INTO UPPER ARTERY, PERCUTANEOUS APPROACH: ICD-10-PCS

## 2020-05-03 PROCEDURE — 02HV33Z INSERTION OF INFUSION DEVICE INTO SUPERIOR VENA CAVA, PERCUTANEOUS APPROACH: ICD-10-PCS

## 2020-05-03 PROCEDURE — 4A133J1 MONITORING OF ARTERIAL PULSE, PERIPHERAL, PERCUTANEOUS APPROACH: ICD-10-PCS

## 2020-05-03 PROCEDURE — 4A133B1 MONITORING OF ARTERIAL PRESSURE, PERIPHERAL, PERCUTANEOUS APPROACH: ICD-10-PCS

## 2020-05-03 RX ADMIN — LACTULOSE SCH GM: 20 SOLUTION ORAL at 09:13

## 2020-05-03 RX ADMIN — CYANOCOBALAMIN TAB 500 MCG SCH MCG: 500 TAB at 09:14

## 2020-05-03 RX ADMIN — MIDODRINE HYDROCHLORIDE SCH MG: 5 TABLET ORAL at 17:30

## 2020-05-03 RX ADMIN — MIDODRINE HYDROCHLORIDE SCH MG: 5 TABLET ORAL at 09:13

## 2020-05-03 RX ADMIN — FLUDROCORTISONE ACETATE SCH: 0.1 TABLET ORAL at 21:11

## 2020-05-03 RX ADMIN — METHYLPREDNISOLONE SODIUM SUCCINATE SCH MG: 125 INJECTION, POWDER, FOR SOLUTION INTRAMUSCULAR; INTRAVENOUS at 13:02

## 2020-05-03 RX ADMIN — CEFAZOLIN SCH MLS/HR: 330 INJECTION, POWDER, FOR SOLUTION INTRAMUSCULAR; INTRAVENOUS at 12:02

## 2020-05-03 RX ADMIN — FLUDROCORTISONE ACETATE SCH MG: 0.1 TABLET ORAL at 09:14

## 2020-05-03 RX ADMIN — GABAPENTIN SCH: 100 CAPSULE ORAL at 21:11

## 2020-05-03 RX ADMIN — ALBUTEROL SULFATE SCH PUFF: 90 AEROSOL, METERED RESPIRATORY (INHALATION) at 15:42

## 2020-05-03 RX ADMIN — SERTRALINE HYDROCHLORIDE SCH: 50 TABLET, FILM COATED ORAL at 11:59

## 2020-05-03 RX ADMIN — MIDODRINE HYDROCHLORIDE SCH: 5 TABLET ORAL at 13:00

## 2020-05-03 RX ADMIN — PANTOPRAZOLE SODIUM SCH MG: 40 TABLET, DELAYED RELEASE ORAL at 09:14

## 2020-05-03 RX ADMIN — ALBUTEROL SULFATE SCH PUFF: 90 AEROSOL, METERED RESPIRATORY (INHALATION) at 19:59

## 2020-05-03 RX ADMIN — FUROSEMIDE SCH MLS/HR: 10 INJECTION, SOLUTION INTRAMUSCULAR; INTRAVENOUS at 12:59

## 2020-05-03 RX ADMIN — METHYLPREDNISOLONE SODIUM SUCCINATE SCH MG: 125 INJECTION, POWDER, FOR SOLUTION INTRAMUSCULAR; INTRAVENOUS at 17:30

## 2020-05-03 RX ADMIN — INSULIN ASPART SCH UNIT: 100 INJECTION, SOLUTION INTRAVENOUS; SUBCUTANEOUS at 21:11

## 2020-05-03 RX ADMIN — FUROSEMIDE SCH MLS/HR: 10 INJECTION, SOLUTION INTRAMUSCULAR; INTRAVENOUS at 21:09

## 2020-05-03 RX ADMIN — ALBUMIN HUMAN SCH MLS/HR: 0.25 SOLUTION INTRAVENOUS at 12:53

## 2020-05-03 RX ADMIN — NOREPINEPHRINE BITARTRATE SCH MLS/HR: 1 INJECTION, SOLUTION, CONCENTRATE INTRAVENOUS at 10:00

## 2020-05-03 RX ADMIN — CEFEPIME HYDROCHLORIDE SCH MLS/HR: 2 INJECTION, POWDER, FOR SOLUTION INTRAVENOUS at 09:13

## 2020-05-03 RX ADMIN — DILTIAZEM HYDROCHLORIDE SCH: 5 INJECTION INTRAVENOUS at 12:01

## 2020-05-03 RX ADMIN — ASPIRIN 81 MG CHEWABLE TABLET SCH MG: 81 TABLET CHEWABLE at 09:14

## 2020-05-03 RX ADMIN — Medication SCH MG: at 09:15

## 2020-05-03 RX ADMIN — ALBUMIN HUMAN SCH MLS/HR: 0.25 SOLUTION INTRAVENOUS at 14:00

## 2020-05-03 RX ADMIN — Medication SCH UNIT: at 09:14

## 2020-05-03 RX ADMIN — METHYLPREDNISOLONE SODIUM SUCCINATE SCH MG: 125 INJECTION, POWDER, FOR SOLUTION INTRAMUSCULAR; INTRAVENOUS at 09:17

## 2020-05-03 RX ADMIN — ALBUMIN HUMAN SCH MLS/HR: 0.25 SOLUTION INTRAVENOUS at 14:50

## 2020-05-03 RX ADMIN — METOPROLOL TARTRATE SCH: 25 TABLET, FILM COATED ORAL at 21:12

## 2020-05-03 RX ADMIN — DILTIAZEM HYDROCHLORIDE SCH MLS/HR: 5 INJECTION INTRAVENOUS at 21:10

## 2020-05-03 RX ADMIN — PIPERACILLIN AND TAZOBACTAM SCH MLS/HR: 3; .375 INJECTION, POWDER, FOR SOLUTION INTRAVENOUS at 17:28

## 2020-05-03 RX ADMIN — ALBUMIN HUMAN SCH MLS/HR: 0.25 SOLUTION INTRAVENOUS at 16:28

## 2020-05-03 RX ADMIN — Medication SCH MG: at 09:14

## 2020-05-03 RX ADMIN — METOPROLOL TARTRATE SCH MG: 25 TABLET, FILM COATED ORAL at 09:14

## 2020-05-03 NOTE — XR
EXAMINATION TYPE: XR chest 1V portable

 

DATE OF EXAM: 5/3/2020

 

HISTORY: chf.

 

REFERENCE: Previous study dated 5/1/2020.

 

FINDINGS: There is multichamber cardiac enlargement. There is worsening right basilar pneumonia. Ther
e is patchy pneumonia on the left. There are bilateral effusions, greater on the left.

 

IMPRESSION: 

1. CARDIOMEGALY.

2. WORSENING BILATERAL PNEUMONIAS.

## 2020-05-03 NOTE — P.NPCON
History of Present Illness





- Reason for Consult


Consult date: 05/03/20


acute renal failure





- Chief Complaint


shortness of breath





- History of Present Illness


admitted from [facility] with shortness of breath on 

04/28/2020.nephrology was consulted for acute kidney injury.baseline creatinine 

is 1.0-1.1 MG per DL.  On admission creatinine was 2.1 MG per DL was stable 

until yesterday, developed oliguria and creatinine started rising.  Creatinine 

today is 2.7 MG per DL.she has history of chronic liver disease and had 

paracentesis done on 05/01/2020 and 2.5 L were removed.  She was on broad-

spectrum antibiotics for pneumonia developed hypotension and elevated lactic 

acid transferred to ICU currently on levofloxacin.  Urine output still marginal.

 No NSAID use or recent contrast studies.  She was on Cardizem drip for A. fib.








Review of Systems


ROS unobtainable: due to mental status





Past Medical History


Past Medical History: Atrial Fibrillation, Cancer, Heart Failure, Diabetes 

Mellitus, Hypertension, Liver Disease


Additional Past Medical History / Comment(s): Pt recently admitted to [facility] on 

3/11/20 with possible NSTEMI, paroxysmal Afib/RVR, hypovolemia 2ndary to large 

volume paracentesis, acute UTI, acute renal failure.     Other hx:   

Nonalcoholic cirrhosis-thought d/t rx, recurrent ascities/paracentesis, 

thrombocytopenia, portal htn, L breast cancer with lumpectomy, NIDDM type II, 

neuropathy bilateral legs/feet,


History of Any Multi-Drug Resistant Organisms: VRE


Date of last positivie culture/infection: 3/31/20


MDRO Source:: VRE URINE


Past Surgical History: Breast Surgery, Cholecystectomy


Additional Past Surgical History / Comment(s): L breast lumpectomy for cancer, 

right breast lump removal-benign, paracentesis-several,


Past Anesthesia/Blood Transfusion Reactions: No Reported Reaction


Past Psychological History: Depression


Additional Psychological History / Comment(s): Pt resides with her spouse. She 

states she is receiving home care but cannot recall name of company.  She has a 

walker/glucometer/scale and grab rail.


Smoking Status: Never smoker


Past Alcohol Use History: None Reported


Past Drug Use History: None Reported





- Past Family History


  ** Father


Family Medical History: Cancer


Additional Family Medical History / Comment(s): Colon cancer





  ** Brother(s)


Family Medical History: Myocardial Infarction (MI)


Additional Family Medical History / Comment(s): One brother with stents, another

brother passed away from an MI





  ** Mother


Family Medical History: Neurologic Disorder


Additional Family Medical History / Comment(s): Parkinsons





Medications and Allergies


                                Home Medications











 Medication  Instructions  Recorded  Confirmed  Type


 


Cholecalciferol (Vitamin D3) 2,000 unit PO DAILY 11/09/19 04/28/20 History





[Vitamin D3]    


 


Pioglitazone [Actos] 30 mg PO DAILY 11/09/19 04/28/20 History


 


Sertraline [Zoloft] 50 mg PO DAILY 11/09/19 04/28/20 History


 


Cyanocobalamin [Vitamin B-12] 1,000 mcg PO DAILY #60 tab 11/12/19 04/28/20 Rx


 


Niacin 2,000 mg PO DAILY 03/10/20 04/28/20 History


 


Acetaminophen Tab [Tylenol] 500 mg PO Q6HR PRN  tab 03/20/20 04/28/20 Rx


 


Aspirin 81 mg PO DAILY #30 chew 03/20/20 04/28/20 Rx


 


Fludrocortisone [Florinef] 0.2 mg PO BID #120 tab 03/20/20 04/28/20 Rx


 


Glimepiride [Amaryl] 2 mg PO BID #60 tab 03/20/20 04/28/20 Rx


 


Lactulose [Cephulac] 10 gm PO DAILY #450 ml 03/20/20 04/28/20 Rx


 


Magnesium Oxide [Mag-Ox] 400 mg PO DAILY #10 tab 03/20/20 04/28/20 Rx


 


Nitroglycerin Sl Tabs [Nitrostat] 0.4 mg SUBLINGUAL Q5M PRN #30 tab 03/20/20 04/28/20 Rx


 


Pantoprazole [Protonix] 40 mg PO AC-BRKFST #30 tablet.dr 03/20/20 04/28/20 Rx


 


Furosemide [Lasix] 40 mg PO BID 03/31/20 04/28/20 History


 


Spironolactone [Aldactone] 50 mg PO DAILY  tab 04/01/20 04/28/20 Rx


 


Gabapentin [Neurontin] 100 mg PO HS 04/28/20 04/28/20 History


 


Metoprolol Tartrate 12.5 mg PO BID 04/28/20 04/28/20 History


 


Sulfamethox-Tmp 200-40Mg/5Ml 20 ml PO Q12HR 04/28/20 04/28/20 History





[Bactrim Suspension]    








                                    Allergies











Allergy/AdvReac Type Severity Reaction Status Date / Time


 


No Known Allergies Allergy   Verified 03/31/20 11:14














Physical Exam


Vitals: 


                                   Vital Signs











  Temp Pulse Pulse Resp BP BP BP


 


 05/03/20 11:30   65   17  111/75  


 


 05/03/20 11:00   64   15  102/73  


 


 05/03/20 10:00   69   7 L  93/52  


 


 05/03/20 09:00  97.8 F  109 H   17  92/49  


 


 05/03/20 08:30   80   21  98/52  


 


 05/03/20 08:00   80   13  117/47  


 


 05/03/20 07:30   80   18  101/44  


 


 05/03/20 07:00   79   30 H  114/44  


 


 05/03/20 06:30   80   21  108/66  


 


 05/03/20 06:00   80   20  93/51  


 


 05/03/20 05:30   80   17  98/55  


 


 05/03/20 05:00   82   9 L  94/47  


 


 05/03/20 04:30   83   17  99/46  


 


 05/03/20 04:00  96.5 F L  90  68  6 L  92/52  


 


 05/03/20 03:30   86   10 L  99/64  


 


 05/03/20 03:00   114 H   16  112/63  


 


 05/03/20 02:30   130 H   16  122/54  


 


 05/03/20 02:00   123 H   15  108/69  


 


 05/03/20 01:30   126 H   17   


 


 05/03/20 01:00   138 H   12  104/52  


 


 05/03/20 00:45   95   19  104/50  


 


 05/03/20 00:30   86   18  104/48  


 


 05/03/20 00:15   67   17  82/63  


 


 05/03/20 00:00  96.8 F L  63   16  107/44  


 


 05/02/20 23:45   66   27 H  101/45  


 


 05/02/20 23:10        102/37


 


 05/02/20 23:05        91/38


 


 05/02/20 23:00        66/45


 


 05/02/20 22:55       77/37  78/40


 


 05/02/20 20:00  97.6 F   68  18    111/49


 


 05/02/20 19:10        103/42


 


 05/02/20 19:00        85/38


 


 05/02/20 18:40        106/42


 


 05/02/20 15:32  98.3 F   67  16    96/45














  Pulse Ox


 


 05/03/20 11:30  96


 


 05/03/20 11:00  93 L


 


 05/03/20 10:00  92 L


 


 05/03/20 09:00  92 L


 


 05/03/20 08:30  76 L


 


 05/03/20 08:00  94 L


 


 05/03/20 07:30  82 L


 


 05/03/20 07:00  92 L


 


 05/03/20 06:30  92 L


 


 05/03/20 06:00  92 L


 


 05/03/20 05:30  92 L


 


 05/03/20 05:00  93 L


 


 05/03/20 04:30  93 L


 


 05/03/20 04:00  95


 


 05/03/20 03:30  95


 


 05/03/20 03:00 


 


 05/03/20 02:30  97


 


 05/03/20 02:00 


 


 05/03/20 01:30  85 L


 


 05/03/20 01:00  95


 


 05/03/20 00:45  91 L


 


 05/03/20 00:30  99


 


 05/03/20 00:15  100


 


 05/03/20 00:00  98


 


 05/02/20 23:45  99


 


 05/02/20 23:10 


 


 05/02/20 23:05 


 


 05/02/20 23:00 


 


 05/02/20 22:55 


 


 05/02/20 20:00  96


 


 05/02/20 19:10 


 


 05/02/20 19:00 


 


 05/02/20 18:40 


 


 05/02/20 15:32  98








                                Intake and Output











 05/02/20 05/03/20 05/03/20





 22:59 06:59 14:59


 


Intake Total 665 1170 401.25


 


Output Total 100 70 60


 


Balance 565 1100 341.25


 


Intake:   


 


  IV  770 280


 


    9  350 


 


    Cefepime 2 gm In Sodium   100





    Chloride 0.9% 100 ml @   





    200 mls/hr IVPB Q24H [facility]   





    Rx#:[id number]   


 


    Sodium Chloride 0.9% 1,  420 180





    000 ml @ 20 mls/hr IV .   





    Q24H [facility] Rx#:[id number]   


 


  Intake, IV Titration 415 400 121.25





  Amount   


 


    Albumin Human 25% 50 ml  400 





    In Empty Bag 1 bag @ 200   





    mls/hr IVPB Q15M [facility] Rx#:   





    [id number]   


 


    Diltiazem 125 mg In 15  121.25





    Sodium Chloride 0.9% 100   





    ml @ 5 MG/HR 5 mls/hr IV   





    .Q24H [facility] Rx#:[id number]   


 


    Sodium Chloride 0.9% 1, 400  





    000 ml @ 50 mls/hr IV .   





    Q20H [facility] Rx#:[id number]   


 


  Oral 250 0 


 


Output:   


 


  Urine 100 70 60


 


Other:   


 


  Voiding Method Indwelling Catheter Indwelling Catheter 











limited exam secondary to COVID pandemic and to limit PPE








Results





- Lab Results


                             Most recent lab results











ABG pH  7.35  (7.35-7.45)   05/03/20  08:27    


 


ABG pCO2  32 mmHg (35-45)  L  05/03/20  08:27    


 


ABG pO2  58 mmHg ()  L*  05/03/20  08:27    


 


ABG HCO3  18 mmol/L (21-25)  L  05/03/20  08:27    


 


ABG O2 Saturation  89.4 % (94-97)  L  05/03/20  08:27    


 


Calcium  9.0 mg/dL (8.4-10.2)   05/03/20  02:53    














                                 05/03/20 02:53





                                 05/03/20 02:53





Assessment and Plan


Assessment: 


#1 oliguric acute kidney injury secondary to ischemic ATN.


#2 pneumonia with septic shock


#3 chronic liver disease status post paracentesis


#4 atrial fibrillation with RVR


#5 respiratory and metabolic acidosis


#6 mild hyperkalemia


#7 lactic acidosis








Plan: 


#1 agree with holding Cardizem drip, levo fed to maintain systolic more than 120

or map of more than 65.


#2 prognosis guarded with multiple comorbid condition.  Not a candidate for 

dialysis if need arises.


#3 avoid nephrotoxic agents and hypotensive episodes.


#4 give albumin today with a concern for HRSmom already on midodrine and levo 

fed

## 2020-05-03 NOTE — P.PN
Subjective


Progress Note Date: 05/03/20


This is a 79-year-old  female with past medical history significant for

paroxysmal atrial fibrillation, on long-term anticoagulation, hypertension, 

hyperlipidemia, diabetes, nonalcoholic cirrhosis, coronary artery disease.  

Patient had an abnormal stress test in March of this year and at that time was 

recommended to undergo cardiac catheterization.  Unfortunately she became 

medically unstable and therefore the cardiac catheterization had not been 

performed.  She follows with Dr. Puga in the office.  Echocardiogram with 

Doppler study performed in March of this year revealed a normal left ventricular

systolic function.  Patient presents to the hospital on this occasion with 

symptoms of 2-3 day duration of progressively worsening shortness of breath.  

Her EKG on admission showed atrial fibrillation with a rapid ventricular 

response, heart rate 148, subsequent EKG showed normal sinus rhythm.  Patient 

remains in a normal sinus rhythm this morning.  Subsequent chest x-ray was also 

performed, which revealed congestive heart failure.  Her initial chest x-ray 

showed diffuse patchy infiltrates in the right lower lobe.  Temperature 97.7, 

blood pressure 128/58 with a heart rate in the 70s, 99% on 2 L of oxygen.  White

blood cell count 12.9, hemoglobin 10.0, platelet count 153.  Sodium 136, 

potassium 4.6, BUN 83, and creatinine 2.1 yesterday, 84 and 2.1 today.  

Coronavirus testing and negative, pro calcitonin 0.38, BNP level 13,800, 

troponin 0.069.At the time of her examination by Dr. Escalante, patient was not in

any acute distress, still complaining of some mild shortness of breath however.





04/30/2020


Patient was seen and examined this morning, she does feel as though her breathin

g is improving, her weight is down 3 kg today.  Blood pressure 126/40 with a 

heart rate in the 70s, 95% on 3 L of oxygen.  White blood cell count 11.4, 

hemoglobin 9.2, platelet count 132.  Sodium 138, potassium 4.6, BUN 81, 

creatinine 2.0.  Patient is currently on oral diuretics.  We will give her one 

time dose of additional 40 mg IV Lasix today.  Check her labs in the morning.








05/01/2020


Patient was seen and examined this morning by Dr. Escalante, continues to feel 

short of breath.  Blood pressure 102/50 with a heart rate in the 60s, 94% on 4 L

of oxygen.  A repeat chest x-ray was performed this morning and continues to be 

pending.  Patient again underwent successful paracentesis under ultrasound 

guidance this morning, for 2.5 L of straw-colored fluid.  White blood cell count

11.4, hemoglobin 9.5, platelet count 77.  Sodium 140, potassium 5.8, BUN 81 and 

creatinine 2.0.  We will start the patient today on a twice a day dose of IV 

Lasix, hold the oral diuretics for now.  Check lytes BUN and creatinine in the 

morning.








05/02/2020





Patient seen and examined this morning, overall states that she feels her 

breathing is mildly improved.  Blood pressure 100/60 with a heart rate in the 7

0s, earlier this morning patient went into a rapid A. fib with a heart rate in 

the 150 range, we did initiate an IV Cardizem bolus with subsequent drip.  White

blood cell count 13.6, hematoma and 8.6, platelet count 90.  Sodium 137, 

potassium 4.6, BUN 77 and creatinine 2.3.





5/3/2020


Patient seen this morning in ICU, breathing is worse this morning.  Pulmonary 

has been in to see the patient ABGs have been drawn.  Staff is attempting to 

reach the family to discuss CODE STATUS.  Chest x-ray this morning showed 

cardiomegaly and worsening bilateral pneumonias.  morning show white blood cell 

count of 13,000, hemoglobin 8, hematocrit 27.1, platelet count 96, potassium 

5.2, BUN 83, creatinine 2.7, plasma lactic acid 2.1. 








Objective





- Vital Signs


Vital signs: 


                                   Vital Signs











Temp  97.8 F   05/03/20 09:00


 


Pulse  109 H  05/03/20 09:00


 


Resp  17   05/03/20 09:00


 


BP  92/49   05/03/20 09:00


 


Pulse Ox  92 L  05/03/20 09:00








                                 Intake & Output











 05/02/20 05/03/20 05/03/20





 18:59 06:59 18:59


 


Intake Total 665 1170 260


 


Output Total 100 70 25


 


Balance 565 1100 235


 


Intake:   


 


  IV  770 260


 


    9  350 


 


    Cefepime 2 gm In Sodium   100





    Chloride 0.9% 100 ml @   





    200 mls/hr IVPB Q24H [facility]   





    Rx#:[id number]   


 


    Sodium Chloride 0.9% 1,  420 160





    000 ml @ 70 mls/hr IV .   





    [id number] [facility] Rx#:[id number]   


 


  Intake, IV Titration 415 400 





  Amount   


 


    Albumin Human 25% 50 ml  400 





    In Empty Bag 1 bag @ 200   





    mls/hr IVPB Q15M [facility] Rx#:   





    [id number]   


 


    Diltiazem 125 mg In 15  





    Sodium Chloride 0.9% 100   





    ml @ 5 MG/HR 5 mls/hr IV   





    .Q24H [facility] Rx#:[id number]   


 


    Sodium Chloride 0.9% 1, 400  





    000 ml @ 50 mls/hr IV .   





    Q20H [facility] Rx#:[id number]   


 


  Oral 250 0 


 


Output:   


 


  Urine 100 70 25


 


Other:   


 


  Voiding Method Indwelling Catheter Indwelling Catheter 














- Labs


CBC & Chem 7: 


                                 05/03/20 02:53





                                 05/03/20 02:53


Labs: 


                  Abnormal Lab Results - Last 24 Hours (Table)











  05/02/20 05/02/20 05/02/20 Range/Units





  11:12 15:00 16:27 


 


WBC     (3.8-10.6)  k/uL


 


RBC     (3.80-5.40)  m/uL


 


Hgb     (11.4-16.0)  gm/dL


 


Hct     (34.0-46.0)  %


 


MCH     (25.0-35.0)  pg


 


MCHC     (31.0-37.0)  g/dL


 


RDW     (11.5-15.5)  %


 


Plt Count     (150-450)  k/uL


 


Neutrophils #     (1.3-7.7)  k/uL


 


Lymphocytes #     (1.0-4.8)  k/uL


 


PT     (9.0-12.0)  sec


 


INR     (<1.2)  


 


APTT     (22.0-30.0)  sec


 


D-Dimer     (<0.60)  mg/L FEU


 


ABG pCO2     (35-45)  mmHg


 


ABG pO2     ()  mmHg


 


ABG HCO3     (21-25)  mmol/L


 


ABG O2 Saturation     (94-97)  %


 


Potassium     (3.5-5.1)  mmol/L


 


Carbon Dioxide     (22-30)  mmol/L


 


BUN     (7-17)  mg/dL


 


Creatinine     (0.52-1.04)  mg/dL


 


Glucose     (74-99)  mg/dL


 


POC Glucose (mg/dL)  140 H   201 H  (75-99)  mg/dL


 


Plasma Lactic Acid Joaquín     (0.7-2.0)  mmol/L


 


Iron     ()  ug/dL


 


% Saturation     (12.00-45.00)   


 


C-Reactive Protein     (<10.0)  mg/L


 


Urine Appearance   Turbid H   (Clear)  


 


Urine Protein   2+ H   (Negative)  


 


Urine Blood   Large H   (Negative)  


 


Ur Leukocyte Esterase   Moderate H   (Negative)  


 


Urine RBC   >182 H   (0-5)  /hpf


 


Urine WBC   64 H   (0-5)  /hpf


 


Urine WBC Clumps     (None)  /hpf


 


Amorphous Sediment   Rare H   (None)  /hpf


 


Urine Bacteria     (None)  /hpf


 


Hyaline Casts   30 H   (0-2)  /lpf














  05/02/20 05/02/20 05/02/20 Range/Units





  19:03 19:03 19:03 


 


WBC     (3.8-10.6)  k/uL


 


RBC     (3.80-5.40)  m/uL


 


Hgb     (11.4-16.0)  gm/dL


 


Hct     (34.0-46.0)  %


 


MCH     (25.0-35.0)  pg


 


MCHC     (31.0-37.0)  g/dL


 


RDW     (11.5-15.5)  %


 


Plt Count     (150-450)  k/uL


 


Neutrophils #     (1.3-7.7)  k/uL


 


Lymphocytes #     (1.0-4.8)  k/uL


 


PT     (9.0-12.0)  sec


 


INR     (<1.2)  


 


APTT     (22.0-30.0)  sec


 


D-Dimer   6.80 H   (<0.60)  mg/L FEU


 


ABG pCO2     (35-45)  mmHg


 


ABG pO2     ()  mmHg


 


ABG HCO3     (21-25)  mmol/L


 


ABG O2 Saturation     (94-97)  %


 


Potassium     (3.5-5.1)  mmol/L


 


Carbon Dioxide     (22-30)  mmol/L


 


BUN     (7-17)  mg/dL


 


Creatinine     (0.52-1.04)  mg/dL


 


Glucose     (74-99)  mg/dL


 


POC Glucose (mg/dL)     (75-99)  mg/dL


 


Plasma Lactic Acid Joaquín  2.4 H*    (0.7-2.0)  mmol/L


 


Iron    15 L  ()  ug/dL


 


% Saturation    5.79 L  (12.00-45.00)   


 


C-Reactive Protein    49.8 H  (<10.0)  mg/L


 


Urine Appearance     (Clear)  


 


Urine Protein     (Negative)  


 


Urine Blood     (Negative)  


 


Ur Leukocyte Esterase     (Negative)  


 


Urine RBC     (0-5)  /hpf


 


Urine WBC     (0-5)  /hpf


 


Urine WBC Clumps     (None)  /hpf


 


Amorphous Sediment     (None)  /hpf


 


Urine Bacteria     (None)  /hpf


 


Hyaline Casts     (0-2)  /lpf














  05/02/20 05/02/20 05/02/20 Range/Units





  19:03 19:03 19:28 


 


WBC   12.8 H   (3.8-10.6)  k/uL


 


RBC   3.24 L   (3.80-5.40)  m/uL


 


Hgb   7.8 L   (11.4-16.0)  gm/dL


 


Hct   27.7 L   (34.0-46.0)  %


 


MCH   24.1 L   (25.0-35.0)  pg


 


MCHC   28.2 L   (31.0-37.0)  g/dL


 


RDW   17.7 H   (11.5-15.5)  %


 


Plt Count   92 L   (150-450)  k/uL


 


Neutrophils #     (1.3-7.7)  k/uL


 


Lymphocytes #     (1.0-4.8)  k/uL


 


PT  21.1 H    (9.0-12.0)  sec


 


INR  2.2 H    (<1.2)  


 


APTT  31.5 H    (22.0-30.0)  sec


 


D-Dimer     (<0.60)  mg/L FEU


 


ABG pCO2     (35-45)  mmHg


 


ABG pO2     ()  mmHg


 


ABG HCO3     (21-25)  mmol/L


 


ABG O2 Saturation     (94-97)  %


 


Potassium     (3.5-5.1)  mmol/L


 


Carbon Dioxide     (22-30)  mmol/L


 


BUN     (7-17)  mg/dL


 


Creatinine     (0.52-1.04)  mg/dL


 


Glucose     (74-99)  mg/dL


 


POC Glucose (mg/dL)    219 H  (75-99)  mg/dL


 


Plasma Lactic Acid Joaquín     (0.7-2.0)  mmol/L


 


Iron     ()  ug/dL


 


% Saturation     (12.00-45.00)   


 


C-Reactive Protein     (<10.0)  mg/L


 


Urine Appearance     (Clear)  


 


Urine Protein     (Negative)  


 


Urine Blood     (Negative)  


 


Ur Leukocyte Esterase     (Negative)  


 


Urine RBC     (0-5)  /hpf


 


Urine WBC     (0-5)  /hpf


 


Urine WBC Clumps     (None)  /hpf


 


Amorphous Sediment     (None)  /hpf


 


Urine Bacteria     (None)  /hpf


 


Hyaline Casts     (0-2)  /lpf














  05/02/20 05/02/20 05/03/20 Range/Units





  22:48 23:41 02:53 


 


WBC     (3.8-10.6)  k/uL


 


RBC     (3.80-5.40)  m/uL


 


Hgb     (11.4-16.0)  gm/dL


 


Hct     (34.0-46.0)  %


 


MCH     (25.0-35.0)  pg


 


MCHC     (31.0-37.0)  g/dL


 


RDW     (11.5-15.5)  %


 


Plt Count     (150-450)  k/uL


 


Neutrophils #     (1.3-7.7)  k/uL


 


Lymphocytes #     (1.0-4.8)  k/uL


 


PT     (9.0-12.0)  sec


 


INR     (<1.2)  


 


APTT     (22.0-30.0)  sec


 


D-Dimer     (<0.60)  mg/L FEU


 


ABG pCO2     (35-45)  mmHg


 


ABG pO2     ()  mmHg


 


ABG HCO3     (21-25)  mmol/L


 


ABG O2 Saturation     (94-97)  %


 


Potassium    5.2 H  (3.5-5.1)  mmol/L


 


Carbon Dioxide    18 L  (22-30)  mmol/L


 


BUN    83 H  (7-17)  mg/dL


 


Creatinine    2.70 H  (0.52-1.04)  mg/dL


 


Glucose    211 H  (74-99)  mg/dL


 


POC Glucose (mg/dL)   213 H   (75-99)  mg/dL


 


Plasma Lactic Acid Joaquín  2.3 H*    (0.7-2.0)  mmol/L


 


Iron     ()  ug/dL


 


% Saturation     (12.00-45.00)   


 


C-Reactive Protein     (<10.0)  mg/L


 


Urine Appearance     (Clear)  


 


Urine Protein     (Negative)  


 


Urine Blood     (Negative)  


 


Ur Leukocyte Esterase     (Negative)  


 


Urine RBC     (0-5)  /hpf


 


Urine WBC     (0-5)  /hpf


 


Urine WBC Clumps     (None)  /hpf


 


Amorphous Sediment     (None)  /hpf


 


Urine Bacteria     (None)  /hpf


 


Hyaline Casts     (0-2)  /lpf














  05/03/20 05/03/20 05/03/20 Range/Units





  02:53 02:53 03:15 


 


WBC  13.2 H    (3.8-10.6)  k/uL


 


RBC  3.16 L    (3.80-5.40)  m/uL


 


Hgb  8.0 L    (11.4-16.0)  gm/dL


 


Hct  27.1 L    (34.0-46.0)  %


 


MCH     (25.0-35.0)  pg


 


MCHC  29.6 L    (31.0-37.0)  g/dL


 


RDW  17.6 H    (11.5-15.5)  %


 


Plt Count  96 L    (150-450)  k/uL


 


Neutrophils #  11.6 H    (1.3-7.7)  k/uL


 


Lymphocytes #  0.9 L    (1.0-4.8)  k/uL


 


PT     (9.0-12.0)  sec


 


INR     (<1.2)  


 


APTT     (22.0-30.0)  sec


 


D-Dimer     (<0.60)  mg/L FEU


 


ABG pCO2     (35-45)  mmHg


 


ABG pO2     ()  mmHg


 


ABG HCO3     (21-25)  mmol/L


 


ABG O2 Saturation     (94-97)  %


 


Potassium     (3.5-5.1)  mmol/L


 


Carbon Dioxide     (22-30)  mmol/L


 


BUN     (7-17)  mg/dL


 


Creatinine     (0.52-1.04)  mg/dL


 


Glucose     (74-99)  mg/dL


 


POC Glucose (mg/dL)     (75-99)  mg/dL


 


Plasma Lactic Acid Joaquín   2.2 H*   (0.7-2.0)  mmol/L


 


Iron     ()  ug/dL


 


% Saturation     (12.00-45.00)   


 


C-Reactive Protein     (<10.0)  mg/L


 


Urine Appearance    Turbid H  (Clear)  


 


Urine Protein    2+ H  (Negative)  


 


Urine Blood    Large H  (Negative)  


 


Ur Leukocyte Esterase    Moderate H  (Negative)  


 


Urine RBC    >182 H  (0-5)  /hpf


 


Urine WBC    >182 H  (0-5)  /hpf


 


Urine WBC Clumps    Many H  (None)  /hpf


 


Amorphous Sediment     (None)  /hpf


 


Urine Bacteria    Moderate H  (None)  /hpf


 


Hyaline Casts     (0-2)  /lpf














  05/03/20 05/03/20 Range/Units





  06:56 08:27 


 


WBC    (3.8-10.6)  k/uL


 


RBC    (3.80-5.40)  m/uL


 


Hgb    (11.4-16.0)  gm/dL


 


Hct    (34.0-46.0)  %


 


MCH    (25.0-35.0)  pg


 


MCHC    (31.0-37.0)  g/dL


 


RDW    (11.5-15.5)  %


 


Plt Count    (150-450)  k/uL


 


Neutrophils #    (1.3-7.7)  k/uL


 


Lymphocytes #    (1.0-4.8)  k/uL


 


PT    (9.0-12.0)  sec


 


INR    (<1.2)  


 


APTT    (22.0-30.0)  sec


 


D-Dimer    (<0.60)  mg/L FEU


 


ABG pCO2   32 L  (35-45)  mmHg


 


ABG pO2   58 L*  ()  mmHg


 


ABG HCO3   18 L  (21-25)  mmol/L


 


ABG O2 Saturation   89.4 L  (94-97)  %


 


Potassium    (3.5-5.1)  mmol/L


 


Carbon Dioxide    (22-30)  mmol/L


 


BUN    (7-17)  mg/dL


 


Creatinine    (0.52-1.04)  mg/dL


 


Glucose    (74-99)  mg/dL


 


POC Glucose (mg/dL)    (75-99)  mg/dL


 


Plasma Lactic Acid Joaquín  2.1 H*   (0.7-2.0)  mmol/L


 


Iron    ()  ug/dL


 


% Saturation    (12.00-45.00)   


 


C-Reactive Protein    (<10.0)  mg/L


 


Urine Appearance    (Clear)  


 


Urine Protein    (Negative)  


 


Urine Blood    (Negative)  


 


Ur Leukocyte Esterase    (Negative)  


 


Urine RBC    (0-5)  /hpf


 


Urine WBC    (0-5)  /hpf


 


Urine WBC Clumps    (None)  /hpf


 


Amorphous Sediment    (None)  /hpf


 


Urine Bacteria    (None)  /hpf


 


Hyaline Casts    (0-2)  /lpf








                      Microbiology - Last 24 Hours (Table)











 05/02/20 15:00 Urine Culture - Preliminary





 Urine,Clean Catch 


 


 04/30/20 12:55 Anaerobic Culture - Preliminary





 Paracentesis Fluid 


 


 04/30/20 14:07 Gram Stain - Preliminary





 Ascites Fluid Body Fluid Culture - Preliminary


 


 04/28/20 19:10 Blood Culture - Preliminary





 Blood    No Growth after 96 hours














Assessment and Plan


Assessment: 


#1 Shortness of breath, worsening,  secondary to diastolic congestive heart fa

ilure, acute on chronic, pneumonia.    


Bilateral infiltrates, negative covid 19, although this cannot be completely 

ruled out.  Chest x-ray today showed worsening infiltrate


#2 Acute on chronic renal failure


#3 Ongoing ascites


#4 Metabolic encephalopathy related mostly to her underlying nonalcoholic liver 

cirrhosis, possible sepsis 


 #5 Paroxysmal atrial fibrillation


 #6 History of chronic liver disease and nonalcoholic cirrhosis of the liver


 #7History of recurrent ascites


 #8 History of vancomycin-resistant enterococcal infection


 #9 History of depression


 #10Obesity with body index of 32.3.


 #11 History of recurrent urinary tract infections.


#12 diabetes 


 #13 hypertension 


 #14 hyperlipidemia 


 #15 abnormal stress test in March of this year, suggesting possible underlying 

coronary artery disease 





Plan: 


From cardiology's perspective, medications were reviewed and we will continue 

the same.  We will continue to follow the patient and provide further recomm

endations accordingly.





NP note has been reviewed, I agree with a documented findings and plan of care. 

Patient was seen and examined.

## 2020-05-03 NOTE — P.GSCN
History of Present Illness


Consult date: 05/03/20


History of present illness: 





80yo female who has been in the hospital sevral days for CHF, pneumonia as well 

as multiple medical problems developed gross hematuria. She had a 

microscopically clear urine on admission.She has been on both heparin and asa.  

HEr urine looks inflamed.  THere is no other urological history.  She had an 

abdominal ct scan 11/2019 that did not show any urological issues.





Review of Systems


ROS unobtainable: due to mental status





Past Medical History


Past Medical History: Atrial Fibrillation, Cancer, Heart Failure, Diabetes 

Mellitus, Hypertension, Liver Disease


Additional Past Medical History / Comment(s): Pt recently admitted to [facility] on 

3/11/20 with possible NSTEMI, paroxysmal Afib/RVR, hypovolemia 2ndary to large 

volume paracentesis, acute UTI, acute renal failure.     Other hx:   

Nonalcoholic cirrhosis-thought d/t rx, recurrent ascities/paracentesis, 

thrombocytopenia, portal htn, L breast cancer with lumpectomy, NIDDM type II, 

neuropathy bilateral legs/feet,


History of Any Multi-Drug Resistant Organisms: VRE


Year Discovered:: 3/31/20


MDRO Source:: VRE URINE


Past Surgical History: Breast Surgery, Cholecystectomy


Additional Past Surgical History / Comment(s): L breast lumpectomy for cancer, 

right breast lump removal-benign, paracentesis-several,


Past Anesthesia/Blood Transfusion Reactions: No Reported Reaction


Past Psychological History: Depression


Additional Psychological History / Comment(s): Pt resides with her spouse. She 

states she is receiving home care but cannot recall name of company.  She has a 

walker/glucometer/scale and grab rail.


Smoking Status: Never smoker


Past Alcohol Use History: None Reported


Past Drug Use History: None Reported





- Past Family History


  ** Father


Family Medical History: Cancer


Additional Family Medical History / Comment(s): Colon cancer





  ** Brother(s)


Family Medical History: Myocardial Infarction (MI)


Additional Family Medical History / Comment(s): One brother with stents, another

brother passed away from an MI





  ** Mother


Family Medical History: Neurologic Disorder


Additional Family Medical History / Comment(s): Parkinsons





Medications and Allergies


                                Home Medications











 Medication  Instructions  Recorded  Confirmed  Type


 


Cholecalciferol (Vitamin D3) 2,000 unit PO DAILY 11/09/19 04/28/20 History





[Vitamin D3]    


 


Pioglitazone [Actos] 30 mg PO DAILY 11/09/19 04/28/20 History


 


Sertraline [Zoloft] 50 mg PO DAILY 11/09/19 04/28/20 History


 


Cyanocobalamin [Vitamin B-12] 1,000 mcg PO DAILY #60 tab 11/12/19 04/28/20 Rx


 


Niacin 2,000 mg PO DAILY 03/10/20 04/28/20 History


 


Acetaminophen Tab [Tylenol] 500 mg PO Q6HR PRN  tab 03/20/20 04/28/20 Rx


 


Aspirin 81 mg PO DAILY #30 chew 03/20/20 04/28/20 Rx


 


Fludrocortisone [Florinef] 0.2 mg PO BID #120 tab 03/20/20 04/28/20 Rx


 


Glimepiride [Amaryl] 2 mg PO BID #60 tab 03/20/20 04/28/20 Rx


 


Lactulose [Cephulac] 10 gm PO DAILY #450 ml 03/20/20 04/28/20 Rx


 


Magnesium Oxide [Mag-Ox] 400 mg PO DAILY #10 tab 03/20/20 04/28/20 Rx


 


Nitroglycerin Sl Tabs [Nitrostat] 0.4 mg SUBLINGUAL Q5M PRN #30 tab 03/20/20 04/28/20 Rx


 


Pantoprazole [Protonix] 40 mg PO AC-BRKFST #30 tablet.dr 03/20/20 04/28/20 Rx


 


Furosemide [Lasix] 40 mg PO BID 03/31/20 04/28/20 History


 


Spironolactone [Aldactone] 50 mg PO DAILY  tab 04/01/20 04/28/20 Rx


 


Gabapentin [Neurontin] 100 mg PO HS 04/28/20 04/28/20 History


 


Metoprolol Tartrate 12.5 mg PO BID 04/28/20 04/28/20 History


 


Sulfamethox-Tmp 200-40Mg/5Ml 20 ml PO Q12HR 04/28/20 04/28/20 History





[Bactrim Suspension]    








                                    Allergies











Allergy/AdvReac Type Severity Reaction Status Date / Time


 


No Known Allergies Allergy   Verified 03/31/20 11:14














Surgical - Exam


                                   Vital Signs











Temp Pulse Resp BP Pulse Ox


 


 98.1 F   132 H  22   113/78   100 


 


 04/28/20 14:44  04/28/20 14:44  04/28/20 14:44  04/28/20 14:44  04/28/20 14:44














- General


well developed, moderate distress





- Eyes


PERRL





- Respiratory





shortness of breath





- Cardiovascular


Rhythm: regular





- Abdomen





obese





- Genitourinary





catheter with clear to cloudy urine mixed with old bloody urine





Results





- Labs





                                 05/03/20 02:53





                                 05/03/20 02:53


                  Abnormal Lab Results - Last 24 Hours (Table)











  05/02/20 05/02/20 05/02/20 Range/Units





  19:03 19:03 19:03 


 


WBC     (3.8-10.6)  k/uL


 


RBC     (3.80-5.40)  m/uL


 


Hgb     (11.4-16.0)  gm/dL


 


Hct     (34.0-46.0)  %


 


MCH     (25.0-35.0)  pg


 


MCHC     (31.0-37.0)  g/dL


 


RDW     (11.5-15.5)  %


 


Plt Count     (150-450)  k/uL


 


Neutrophils #     (1.3-7.7)  k/uL


 


Lymphocytes #     (1.0-4.8)  k/uL


 


PT     (9.0-12.0)  sec


 


INR     (<1.2)  


 


APTT     (22.0-30.0)  sec


 


D-Dimer   6.80 H   (<0.60)  mg/L FEU


 


ABG pH     (7.35-7.45)  


 


ABG pCO2     (35-45)  mmHg


 


ABG pO2     ()  mmHg


 


ABG HCO3     (21-25)  mmol/L


 


ABG Total CO2     (19-24)  mmol/L


 


ABG O2 Saturation     (94-97)  %


 


Potassium     (3.5-5.1)  mmol/L


 


Carbon Dioxide     (22-30)  mmol/L


 


BUN     (7-17)  mg/dL


 


Creatinine     (0.52-1.04)  mg/dL


 


Glucose     (74-99)  mg/dL


 


POC Glucose (mg/dL)     (75-99)  mg/dL


 


Plasma Lactic Acid Joaquín  2.4 H*    (0.7-2.0)  mmol/L


 


Iron    15 L  ()  ug/dL


 


% Saturation    5.79 L  (12.00-45.00)   


 


C-Reactive Protein    49.8 H  (<10.0)  mg/L


 


Urine Appearance     (Clear)  


 


Urine Protein     (Negative)  


 


Urine Blood     (Negative)  


 


Ur Leukocyte Esterase     (Negative)  


 


Urine RBC     (0-5)  /hpf


 


Urine WBC     (0-5)  /hpf


 


Urine WBC Clumps     (None)  /hpf


 


Urine Bacteria     (None)  /hpf














  05/02/20 05/02/20 05/02/20 Range/Units





  19:03 19:03 19:28 


 


WBC   12.8 H   (3.8-10.6)  k/uL


 


RBC   3.24 L   (3.80-5.40)  m/uL


 


Hgb   7.8 L   (11.4-16.0)  gm/dL


 


Hct   27.7 L   (34.0-46.0)  %


 


MCH   24.1 L   (25.0-35.0)  pg


 


MCHC   28.2 L   (31.0-37.0)  g/dL


 


RDW   17.7 H   (11.5-15.5)  %


 


Plt Count   92 L   (150-450)  k/uL


 


Neutrophils #     (1.3-7.7)  k/uL


 


Lymphocytes #     (1.0-4.8)  k/uL


 


PT  21.1 H    (9.0-12.0)  sec


 


INR  2.2 H    (<1.2)  


 


APTT  31.5 H    (22.0-30.0)  sec


 


D-Dimer     (<0.60)  mg/L FEU


 


ABG pH     (7.35-7.45)  


 


ABG pCO2     (35-45)  mmHg


 


ABG pO2     ()  mmHg


 


ABG HCO3     (21-25)  mmol/L


 


ABG Total CO2     (19-24)  mmol/L


 


ABG O2 Saturation     (94-97)  %


 


Potassium     (3.5-5.1)  mmol/L


 


Carbon Dioxide     (22-30)  mmol/L


 


BUN     (7-17)  mg/dL


 


Creatinine     (0.52-1.04)  mg/dL


 


Glucose     (74-99)  mg/dL


 


POC Glucose (mg/dL)    219 H  (75-99)  mg/dL


 


Plasma Lactic Acid Joaquín     (0.7-2.0)  mmol/L


 


Iron     ()  ug/dL


 


% Saturation     (12.00-45.00)   


 


C-Reactive Protein     (<10.0)  mg/L


 


Urine Appearance     (Clear)  


 


Urine Protein     (Negative)  


 


Urine Blood     (Negative)  


 


Ur Leukocyte Esterase     (Negative)  


 


Urine RBC     (0-5)  /hpf


 


Urine WBC     (0-5)  /hpf


 


Urine WBC Clumps     (None)  /hpf


 


Urine Bacteria     (None)  /hpf














  05/02/20 05/02/20 05/03/20 Range/Units





  22:48 23:41 02:53 


 


WBC     (3.8-10.6)  k/uL


 


RBC     (3.80-5.40)  m/uL


 


Hgb     (11.4-16.0)  gm/dL


 


Hct     (34.0-46.0)  %


 


MCH     (25.0-35.0)  pg


 


MCHC     (31.0-37.0)  g/dL


 


RDW     (11.5-15.5)  %


 


Plt Count     (150-450)  k/uL


 


Neutrophils #     (1.3-7.7)  k/uL


 


Lymphocytes #     (1.0-4.8)  k/uL


 


PT     (9.0-12.0)  sec


 


INR     (<1.2)  


 


APTT     (22.0-30.0)  sec


 


D-Dimer     (<0.60)  mg/L FEU


 


ABG pH     (7.35-7.45)  


 


ABG pCO2     (35-45)  mmHg


 


ABG pO2     ()  mmHg


 


ABG HCO3     (21-25)  mmol/L


 


ABG Total CO2     (19-24)  mmol/L


 


ABG O2 Saturation     (94-97)  %


 


Potassium    5.2 H  (3.5-5.1)  mmol/L


 


Carbon Dioxide    18 L  (22-30)  mmol/L


 


BUN    83 H  (7-17)  mg/dL


 


Creatinine    2.70 H  (0.52-1.04)  mg/dL


 


Glucose    211 H  (74-99)  mg/dL


 


POC Glucose (mg/dL)   213 H   (75-99)  mg/dL


 


Plasma Lactic Acid Joaquín  2.3 H*    (0.7-2.0)  mmol/L


 


Iron     ()  ug/dL


 


% Saturation     (12.00-45.00)   


 


C-Reactive Protein     (<10.0)  mg/L


 


Urine Appearance     (Clear)  


 


Urine Protein     (Negative)  


 


Urine Blood     (Negative)  


 


Ur Leukocyte Esterase     (Negative)  


 


Urine RBC     (0-5)  /hpf


 


Urine WBC     (0-5)  /hpf


 


Urine WBC Clumps     (None)  /hpf


 


Urine Bacteria     (None)  /hpf














  05/03/20 05/03/20 05/03/20 Range/Units





  02:53 02:53 03:15 


 


WBC  13.2 H    (3.8-10.6)  k/uL


 


RBC  3.16 L    (3.80-5.40)  m/uL


 


Hgb  8.0 L    (11.4-16.0)  gm/dL


 


Hct  27.1 L    (34.0-46.0)  %


 


MCH     (25.0-35.0)  pg


 


MCHC  29.6 L    (31.0-37.0)  g/dL


 


RDW  17.6 H    (11.5-15.5)  %


 


Plt Count  96 L    (150-450)  k/uL


 


Neutrophils #  11.6 H    (1.3-7.7)  k/uL


 


Lymphocytes #  0.9 L    (1.0-4.8)  k/uL


 


PT     (9.0-12.0)  sec


 


INR     (<1.2)  


 


APTT     (22.0-30.0)  sec


 


D-Dimer     (<0.60)  mg/L FEU


 


ABG pH     (7.35-7.45)  


 


ABG pCO2     (35-45)  mmHg


 


ABG pO2     ()  mmHg


 


ABG HCO3     (21-25)  mmol/L


 


ABG Total CO2     (19-24)  mmol/L


 


ABG O2 Saturation     (94-97)  %


 


Potassium     (3.5-5.1)  mmol/L


 


Carbon Dioxide     (22-30)  mmol/L


 


BUN     (7-17)  mg/dL


 


Creatinine     (0.52-1.04)  mg/dL


 


Glucose     (74-99)  mg/dL


 


POC Glucose (mg/dL)     (75-99)  mg/dL


 


Plasma Lactic Acid Joaquín   2.2 H*   (0.7-2.0)  mmol/L


 


Iron     ()  ug/dL


 


% Saturation     (12.00-45.00)   


 


C-Reactive Protein     (<10.0)  mg/L


 


Urine Appearance    Turbid H  (Clear)  


 


Urine Protein    2+ H  (Negative)  


 


Urine Blood    Large H  (Negative)  


 


Ur Leukocyte Esterase    Moderate H  (Negative)  


 


Urine RBC    >182 H  (0-5)  /hpf


 


Urine WBC    >182 H  (0-5)  /hpf


 


Urine WBC Clumps    Many H  (None)  /hpf


 


Urine Bacteria    Moderate H  (None)  /hpf














  05/03/20 05/03/20 05/03/20 Range/Units





  06:56 08:27 12:58 


 


WBC     (3.8-10.6)  k/uL


 


RBC     (3.80-5.40)  m/uL


 


Hgb     (11.4-16.0)  gm/dL


 


Hct     (34.0-46.0)  %


 


MCH     (25.0-35.0)  pg


 


MCHC     (31.0-37.0)  g/dL


 


RDW     (11.5-15.5)  %


 


Plt Count     (150-450)  k/uL


 


Neutrophils #     (1.3-7.7)  k/uL


 


Lymphocytes #     (1.0-4.8)  k/uL


 


PT     (9.0-12.0)  sec


 


INR     (<1.2)  


 


APTT     (22.0-30.0)  sec


 


D-Dimer     (<0.60)  mg/L FEU


 


ABG pH    7.27 L  (7.35-7.45)  


 


ABG pCO2   32 L   (35-45)  mmHg


 


ABG pO2   58 L*  82 L  ()  mmHg


 


ABG HCO3   18 L  17 L  (21-25)  mmol/L


 


ABG Total CO2    18 L  (19-24)  mmol/L


 


ABG O2 Saturation   89.4 L   (94-97)  %


 


Potassium     (3.5-5.1)  mmol/L


 


Carbon Dioxide     (22-30)  mmol/L


 


BUN     (7-17)  mg/dL


 


Creatinine     (0.52-1.04)  mg/dL


 


Glucose     (74-99)  mg/dL


 


POC Glucose (mg/dL)     (75-99)  mg/dL


 


Plasma Lactic Acid Joaquín  2.1 H*    (0.7-2.0)  mmol/L


 


Iron     ()  ug/dL


 


% Saturation     (12.00-45.00)   


 


C-Reactive Protein     (<10.0)  mg/L


 


Urine Appearance     (Clear)  


 


Urine Protein     (Negative)  


 


Urine Blood     (Negative)  


 


Ur Leukocyte Esterase     (Negative)  


 


Urine RBC     (0-5)  /hpf


 


Urine WBC     (0-5)  /hpf


 


Urine WBC Clumps     (None)  /hpf


 


Urine Bacteria     (None)  /hpf














  05/03/20 Range/Units





  18:13 


 


WBC   (3.8-10.6)  k/uL


 


RBC   (3.80-5.40)  m/uL


 


Hgb   (11.4-16.0)  gm/dL


 


Hct   (34.0-46.0)  %


 


MCH   (25.0-35.0)  pg


 


MCHC   (31.0-37.0)  g/dL


 


RDW   (11.5-15.5)  %


 


Plt Count   (150-450)  k/uL


 


Neutrophils #   (1.3-7.7)  k/uL


 


Lymphocytes #   (1.0-4.8)  k/uL


 


PT   (9.0-12.0)  sec


 


INR   (<1.2)  


 


APTT   (22.0-30.0)  sec


 


D-Dimer   (<0.60)  mg/L FEU


 


ABG pH   (7.35-7.45)  


 


ABG pCO2   (35-45)  mmHg


 


ABG pO2   ()  mmHg


 


ABG HCO3   (21-25)  mmol/L


 


ABG Total CO2   (19-24)  mmol/L


 


ABG O2 Saturation   (94-97)  %


 


Potassium   (3.5-5.1)  mmol/L


 


Carbon Dioxide   (22-30)  mmol/L


 


BUN   (7-17)  mg/dL


 


Creatinine   (0.52-1.04)  mg/dL


 


Glucose   (74-99)  mg/dL


 


POC Glucose (mg/dL)  238 H  (75-99)  mg/dL


 


Plasma Lactic Acid Joaquín   (0.7-2.0)  mmol/L


 


Iron   ()  ug/dL


 


% Saturation   (12.00-45.00)   


 


C-Reactive Protein   (<10.0)  mg/L


 


Urine Appearance   (Clear)  


 


Urine Protein   (Negative)  


 


Urine Blood   (Negative)  


 


Ur Leukocyte Esterase   (Negative)  


 


Urine RBC   (0-5)  /hpf


 


Urine WBC   (0-5)  /hpf


 


Urine WBC Clumps   (None)  /hpf


 


Urine Bacteria   (None)  /hpf








                      Microbiology - Last 24 Hours (Table)











 05/02/20 14:11 Blood Culture - Preliminary





 Blood    No Growth after 24 hours


 


 05/02/20 15:00 Urine Culture - Preliminary





 Urine,Clean Catch 


 


 04/30/20 12:55 Anaerobic Culture - Preliminary





 Paracentesis Fluid 


 


 04/30/20 14:07 Gram Stain - Preliminary





 Ascites Fluid Body Fluid Culture - Preliminary


 


 04/28/20 19:10 Blood Culture - Preliminary





 Blood    No Growth after 96 hours








                                 Diabetes panel











  05/03/20 Range/Units





  02:53 


 


Sodium  137  (137-145)  mmol/L


 


Potassium  5.2 H  (3.5-5.1)  mmol/L


 


Chloride  103  ()  mmol/L


 


Carbon Dioxide  18 L  (22-30)  mmol/L


 


BUN  83 H  (7-17)  mg/dL


 


Creatinine  2.70 H  (0.52-1.04)  mg/dL


 


Glucose  211 H  (74-99)  mg/dL


 


Calcium  9.0  (8.4-10.2)  mg/dL








                                  Calcium panel











  05/03/20 Range/Units





  02:53 


 


Calcium  9.0  (8.4-10.2)  mg/dL








                                 Pituitary panel











  05/03/20 Range/Units





  02:53 


 


Sodium  137  (137-145)  mmol/L


 


Potassium  5.2 H  (3.5-5.1)  mmol/L


 


Chloride  103  ()  mmol/L


 


Carbon Dioxide  18 L  (22-30)  mmol/L


 


BUN  83 H  (7-17)  mg/dL


 


Creatinine  2.70 H  (0.52-1.04)  mg/dL


 


Glucose  211 H  (74-99)  mg/dL


 


Calcium  9.0  (8.4-10.2)  mg/dL








                                  Adrenal panel











  05/03/20 Range/Units





  02:53 


 


Sodium  137  (137-145)  mmol/L


 


Potassium  5.2 H  (3.5-5.1)  mmol/L


 


Chloride  103  ()  mmol/L


 


Carbon Dioxide  18 L  (22-30)  mmol/L


 


BUN  83 H  (7-17)  mg/dL


 


Creatinine  2.70 H  (0.52-1.04)  mg/dL


 


Glucose  211 H  (74-99)  mg/dL


 


Calcium  9.0  (8.4-10.2)  mg/dL














- Imaging


CT scan - abdomen: report reviewed, image reviewed


CT scan - pelvis: report reviewed, image reviewed





Assessment and Plan


Assessment: 





Impression:  gross hematuria secondary to cath trauma aggravated by 

anticoagulation.


Recommedations.   Nothing urologicalneeds to be done as long as the urine clears

as this is due to cath trauma as she had clear urine upon admission.

## 2020-05-03 NOTE — XR
EXAMINATION TYPE: XR chest 1V portable

 

DATE OF EXAM: 5/3/2020

 

HISTORY: central line placement.

 

REFERENCE: Previous study dated 5/3/2020.

 

FINDINGS: The heart is enlarged. There is extensive, bilateral airspace disease which may have worsen
ed slightly from previous. Both CP angles are blunted and I cannot exclude small effusions. There has
 been interval placement of a left subclavian catheter. Its tip is in the superior vena cava. I do no
t see evidence of pneumothorax.

 

IMPRESSION: 

1. SATISFACTORY CATHETER PLACEMENT.

2. WORSENING, BILATERAL PNEUMONIAS.

## 2020-05-03 NOTE — PN
PROGRESS NOTE



.



DATE OF SERVICE:

05/03/2020



This 79-year-old woman who was admitted with change in mental status, also had CHF.

The patient also had bilateral pneumonia. Last night, the patient took a turn for the

worse and the patient had hypotension.  Patient also had worsening shortness of breath.

Worsening of the bilateral pneumonia was also noted.  Patient on high-flow nasal

cannula.  Patient was also started on dopamine.  The creatinine is also worsened.

Urine output is diminished.  Urine is high colored, possible indicative of old blood

per Urology.  The patient also had recent abdominal paracentesis.  Neurology following

the patient closely.  The patient is NO CODE at this time.  The patient also had some

facial contusions, but however a face CT scan showed no fractures.



PAST MEDICAL HISTORY:

Reviewed.



REVIEW OF SYSTEMS:

Could not be taken, the patient is confused and drowsy at this time.



CURRENT MEDICATIONS:

Reviewed and include:

1. Tylenol 500 mg q.6h p.r.n.

2. Norco 5 mg q.6h.

3. Albumin.

4. Xanax 0.5 t.i.d.

5. Aspirin 81 mg.

6. Cefepime 2 grams IV daily.

7. Vitamin D3.

8. Diltiazem.

9. Fludrocortisone 0.2 mg b.i.d.

10.Lasix.

11.Cephulac.

12.Magnesium oxide.

13.Solu-Medrol 60 IV q.6.

14.Lopressor.

15.ProAmatine.

16.Narcan.

17.Niacin.

18.Norepinephrine.

19.Protonix.

20.Zoloft.



PHYSICAL EXAM:

Patient is alert and oriented x2.  Pulse is 65. Blood pressure 86/53, respiration 16,

temperature 97 degrees, pulse ox 98% on 15% high-flow nasal cannula.

HEENT:  Conjunctivae normal.

NECK: No JVD.

CARDIOVASCULAR: S1, S2 muffled.

RESPIRATORY: Breath sounds diminished in the bases.  Bilateral scattered rhonchi and

crackles.

ABDOMEN:  Soft, obese, nontender.

LEGS are no edema.

CENTRAL NERVOUS SYSTEM: No focal deficits.



LABS:

ABGs showed pH of 7.27, otherwise WBC 13.2, hemoglobin is 8, sodium is 137, potassium

5.2.  Creatinine is 2.7, which is worsened.



ASSESSMENT:

1. Shortness of breath, multifactorial, possible bilateral pneumonia.  Possibly

    community-acquired, possibly gram-negative, possible Covid-19 associated pneumonia

    with acute hypoxic respiratory failure with possible sepsis, severe sepsis,

    hypotension and septic shock.

2. Congestive heart failure acute exacerbation with acute on chronic diastolic

    dysfunction, ejection fraction 50% to 60% with suspected Covid-19 but however test

    is negative.

3. Change in mental status acute metabolic encephalopathy, acute on chronic.

4. Hyperkalemia secondary to renal failure.

5. Acute renal failure with acute tubular necrosis.

6. Baseline chronic kidney disease stage 3 possibly.

7. Elevated lactic acid, possibly secondary to sepsis, present on admission secondary

    to pneumonia.

8. Troponin 0.06 indeterminate.

9. Hypoalbuminemia.

10.Hyponatremia.

11.Change in mental status, as mentioned earlier.

12.Paroxysmal atrial fibrillation on Eliquis.

13.Generalized gait dysfunction.

14.Increased WBC.

15.Anemia, normocytic.

16.History of multiple falls recently.

17.History of congestive heart failure.

18.History of diabetes type 2.

19.Hypertension.

20.History of chronic liver disease and nonalcoholic cirrhosis of the liver with

    history of multiple abdominal paracentesis.

21.History of urinary tract infection.

22.History of recurrent ascites and paracentesis.

23.History of thrombocytopenia.

24.History of left breast cancer with lumpectomy.

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

26.History of depression.

27.History of cholecystectomy.

28.Obesity with body mass index of 32.6.

29.FULL CODE.



RECOMMENDATIONS AND DISCUSSION:

Recommend to continue current medication, continue to monitor.  Symptomatic treatment.

Otherwise at this time I would recommend continue with the pressor support.  Continue

the broad-spectrum IV antibiotics. Cultures are obtained and otherwise monitor closely

with multiple consultants including Nephrology.  Worsening renal function is a concern.

Discussed with Nephrology. We will continue to monitor.  The patient is currently NO

CODE.  Otherwise the patient also had hematuria which is rather old and the patient's

ascitic fluid findings are also noted.  Overall prognosis extremely guarded because of

multiple complex medical issues.  Many of them are life threatening as listed above.

Dictation.





[id number] / IJN: [id number] / Job#: [id number]

## 2020-05-03 NOTE — PN
PROGRESS NOTE



DATE OF SERVICE:

05/03/2020



REASON FOR FOLLOWUP:

Pneumonia.



INTERVAL HISTORY:

The patient has been transferred to the ICU because of hypotension.  The patient is

currently requiring low-dose pressor support.  However, the patient is more awake and

alert.  The patient mentioning she is not feeling that great.  Has been complaining of

nausea, been feeling nauseated but no vomiting has been reported.  No significant

abdominal pain or diarrhea.  No chest pain.



PHYSICAL EXAMINATION:

Blood pressure 114/38 with a pulse of 68, temperature 97. She is 97% on 15 L high-flow

oxygen.

General description is an elderly female lying in bed in no distress.

RESPIRATORY SYSTEM: Unlabored breathing, decreased breath sounds in the bases. No

wheeze.

HEART: S1, S2.  Regular rate and rhythm.

ABDOMEN:  Soft, mildly distended.  No guarding or rigidity.



LABS:

Hemoglobin is 8, white count 13.2, BUN is 83, creatinine 2.70.



DIAGNOSTIC IMPRESSION AND PLAN:

Patient with hypotension which is multifactorial.  X-ray showing worsening pneumonia,

questionably gram-negative or aspiration etiology.  The patient is complaining of

nausea.  We will switch antibiotic to Zosyn. Blood cultures were done yesterday.  We

will get a UA and try to obtain a sputum and monitor clinical course closely.





MMODL / IJN: [id number] / Job#: [id number]

## 2020-05-03 NOTE — P.PN
Subjective


Progress Note Date: 05/03/20


Principal diagnosis: 





Acute exacerbation of diastolic congestive heart failure





This is a 79-year-old female, familiar to my service, I have seen this patient 

before for ascites, congestive heart failure, liver cirrhosis, mental status 

changes related to hyperammonemia, related to underlying nonalcoholic liver 

cirrhosis.  Patient is also known to have history of diabetes, hypertension, 

seen few weeks ago for falls and weakness, and at the time she had significant 

ascites and significantly elevated ammonia level, underwent large-volume 

paracentesis, developed hypotension, treated mostly with volume replacement, and

she went on to develop fluid overload and diastolic congestive heart failure.  

Patient also had history of paroxysmal atrial fibrillation.  Patient was eventua

lly discharged to medical [facility], and apparently over the last few days the

patient has been generally weak, complaining of shortness of breath, sent back 

to [facility], and her chest x-ray is showing bilateral 

infiltrates.  It is not clear whether the findings are findings of pneumonia or 

findings of diastolic congestive heart failure.  Patient was placed on 

antibiotics empirically, her covid 19 screening was negative, however it is not 

entirely ruled out.  During my evaluation, patient was noted to be on room air, 

O2 saturation is 92% on room air.  She was noted to be in no form of distress.  

And she was already started empirically on antibiotics by the admitting phys

brooke.  Patient was also placed on heparin for paroxysmal atrial fibrillation.  

On physical examination, patient was noted to have significant enlargement of 

her abdominal girth, and I recommended ultrasound of the abdomen patient may 

require repeat paracentesis.  In the meantime I recommended that we continue 

antibiotics, consider a trial of diuretics.  And I have ordered a serum pro 

calcitonin, and I have noticed that her BNP level is significantly elevated.  

Her pro calcitonin is not suggestive of underlying bacterial pneumonia.  It is 

minimally elevated.  Patient is not the greatest historian.  Could not tell me 

exactly why she was sent to the hospital.





On 04/30/2020 patient seen in follow-up on selective care unit, is lethargic, 

but no acute distress, she is on 4 L of oxygen the pulse ox 100%, afebrile, 

hemodynamically stable.  We requested to have interventional radiology do a par

acentesis on her, and ascites fluid will be sent for cultures, cell count with 

differential.  She is on empiric antibiotics in the form of cefepime and 

vancomycin.  he was given a dose of IV Lasix and she is maintained on oral Lasix

of 40 mg twice daily.  Blood culture has been negative.  Today's labs have been 

reviewed showing white blood cell count of 11.4, hemoglobin of 9.2, electrolytes

were within normal limits, profile relatively stable with BUN of 81 creatinine 

is 2.08.  She is on heparin infusion for history of atrial fibrillation which is

currently on hold for paracentesis.  Lung sounds are diminished at the bases, 

abdomen significantly distended suspect large amount of ascites





On 05/01/2020 patient seen in follow-up on selective care unit, she is status 

post paracentesis with removal of 2.5 liters of ascitic fluid.  Ascites fluid 

cultures are pending at this time, cell count showed LVH of 65, glucose of 108, 

fluid Analy nuclear WBCs were 10, and fluid mononuclear WBCs were 90.  More 

consistent with a picture of CHF, liver cirrhosis, and not infection.  Patient 

remains on combination of antibiotics of cefepime and vancomycin.  She is more 

awake and alert on today's exam.  She is going for brain MRI today she was seen 

by neurology in consultation for episode of altered mental status, with 

alteration in her speech and confusion.  Brain CT was obtained yesterday showing

no acute intracranial process, mild to moderate diffuse cerebral atrophy and 

chronic small vessel ischemic changes.  Signs are stable, no worsening dyspnea, 

she is on 4 L of oxygen the pulse ox of %.  She's been afebrile.  Today's 

chest x-ray reviewed showing new multifocal patchy opacities likely related to 

mild pulmonary vascular congestion and possibility of pneumonia is not excluded.





The patient is seen today 05/02/2020 in follow-up on the selective care unit.  

She is currently resting comfortably in bed.  Awake and alert in no acute 

distress.  He is maintaining O2 saturations in the 90s on 3 L/m per nasal 

cannula.  She's been afebrile.  Hemodynamically stable.  Blood culture reveals 

no growth.  Paracentesis fluid at this preliminary no growth.  White count 13.6.

 Hemoglobin 8.6.  Platelet count 90,000.  Sodium 137.  Potassium 4.6.  Bicarb 

20.  Creatinine 2.39.  She is continued on cefepime.  Cardizem drip at 5 mg per 

hour.  IV Lasix 40 mg every 12 hours.  No accurate I&O.  Patient is incontinent.

 Weight 94 kg.  MRI of the brain revealed no acute infarct, midline shift or 

mass effect.  EEG is consistent with generalized diffuse cerebral dysfunction 

most often seen in encephalopathic states.





Reevaluated today on 5/3/20, patient was transferred last night to the intensive

care unit, she had issues related to low blood pressure, worsening shortness of 

breath, and worsening bilateral infiltrates/pulmonary edema.  Patient is now on 

high flow nasal cannula, is also on dopamine which was started by cardiology for

low blood pressure, and I have recommended switching the dopamine to 

norepinephrine.  Patient is confused, she seems to be in moderate respiratory 

distress, and I was about to recommended intubation and mechanical ventilation, 

however family was approached by the nurse, and CODE STATUS was changed to DO 

NOT RESUSCITATE.  Patient was given fluid boluses yesterday for low blood 

pressure, and now clearly her chest x-ray is showing worsening pulmonary edema. 

I will recommend diuretics, I will recommend norepinephrine, and may even 

consider placement of a central line in this patient, depending on her 

requirement for norepinephrine and depending on that low blood pressure.  In the

meantime I have recommended bronchodilators and steroids.  Patient also had 

atrial fibrillation with RVR, and she is now on Cardizem drip along with 

dopamine.  ABG on high flow nasal cannula showed a pO2 of 58 pCO2 of 32 pH of 

7.35.  Her lactic acid is 2.1.











Objective





- Vital Signs


Vital signs: 


                                   Vital Signs











Temp  97.8 F   05/03/20 09:00


 


Pulse  109 H  05/03/20 09:00


 


Resp  17   05/03/20 09:00


 


BP  92/49   05/03/20 09:00


 


Pulse Ox  92 L  05/03/20 09:00








                                 Intake & Output











 05/02/20 05/03/20 05/03/20





 18:59 06:59 18:59


 


Intake Total 665 1170 260


 


Output Total 100 70 25


 


Balance 565 1100 235


 


Intake:   


 


  IV  770 260


 


    9  350 


 


    Cefepime 2 gm In Sodium   100





    Chloride 0.9% 100 ml @   





    200 mls/hr IVPB Q24H KHUSHI   





    Rx#:[id number]   


 


    Sodium Chloride 0.9% 1,  420 160





    000 ml @ 70 mls/hr IV .   





    [id number] KHUSHI Rx#:[id number]   


 


  Intake, IV Titration 415 400 





  Amount   


 


    Albumin Human 25% 50 ml  400 





    In Empty Bag 1 bag @ 200   





    mls/hr IVPB Q15M KHUSHI Rx#:   





    [id number]   


 


    Diltiazem 125 mg In 15  





    Sodium Chloride 0.9% 100   





    ml @ 5 MG/HR 5 mls/hr IV   





    .Q24H KHUSHI Rx#:[id number]   


 


    Sodium Chloride 0.9% 1, 400  





    000 ml @ 50 mls/hr IV .   





    Q20H KHUSHI Rx#:[id number]   


 


  Oral 250 0 


 


Output:   


 


  Urine 100 70 25


 


Other:   


 


  Voiding Method Indwelling Catheter Indwelling Catheter 














- Exam








GENERAL EXAM: pleasant, 79-year-old white female, confused, in moderate 

respiratory distress.


HEENT: PERRLA, EOMI, no neck masses, positive JVD.   infraorbital ecchymosis is 

noted from recent fall.  And facial trauma from fall


CHEST: No chest wall deformity.  Symmetrical expansion. 


LUNGS: Scattered crackles and rhonchi and wheezes bilaterally.


CVS: Regular irregular rhythm, no S3 gallop.  2/6 systolic murmur thought the 

precordium.


ABDOMEN: Soft, large distended.  No hepatosplenomegaly, normal bowel sounds, no 

guarding or rigidity.  Positive abdominal ascites.


EXTREMITIES: Cellulitis lower extremity, 1+ edema, 2+ pulses and upper and lower

extremities.


MUSCULOSKELETAL: Muscle strength and tone normal.


Skin: Facial ecchymosis is noted.


CENTRAL NERVOUS SYSTEM: Confused, however able to follow simple instructions.








- Labs


CBC & Chem 7: 


                                 05/03/20 02:53





                                 05/03/20 02:53


Labs: 


                  Abnormal Lab Results - Last 24 Hours (Table)











  05/02/20 05/02/20 05/02/20 Range/Units





  15:00 16:27 19:03 


 


WBC     (3.8-10.6)  k/uL


 


RBC     (3.80-5.40)  m/uL


 


Hgb     (11.4-16.0)  gm/dL


 


Hct     (34.0-46.0)  %


 


MCH     (25.0-35.0)  pg


 


MCHC     (31.0-37.0)  g/dL


 


RDW     (11.5-15.5)  %


 


Plt Count     (150-450)  k/uL


 


Neutrophils #     (1.3-7.7)  k/uL


 


Lymphocytes #     (1.0-4.8)  k/uL


 


PT     (9.0-12.0)  sec


 


INR     (<1.2)  


 


APTT     (22.0-30.0)  sec


 


D-Dimer     (<0.60)  mg/L FEU


 


ABG pCO2     (35-45)  mmHg


 


ABG pO2     ()  mmHg


 


ABG HCO3     (21-25)  mmol/L


 


ABG O2 Saturation     (94-97)  %


 


Potassium     (3.5-5.1)  mmol/L


 


Carbon Dioxide     (22-30)  mmol/L


 


BUN     (7-17)  mg/dL


 


Creatinine     (0.52-1.04)  mg/dL


 


Glucose     (74-99)  mg/dL


 


POC Glucose (mg/dL)   201 H   (75-99)  mg/dL


 


Plasma Lactic Acid Joaquín    2.4 H*  (0.7-2.0)  mmol/L


 


Iron     ()  ug/dL


 


% Saturation     (12.00-45.00)   


 


C-Reactive Protein     (<10.0)  mg/L


 


Urine Appearance  Turbid H    (Clear)  


 


Urine Protein  2+ H    (Negative)  


 


Urine Blood  Large H    (Negative)  


 


Ur Leukocyte Esterase  Moderate H    (Negative)  


 


Urine RBC  >182 H    (0-5)  /hpf


 


Urine WBC  64 H    (0-5)  /hpf


 


Urine WBC Clumps     (None)  /hpf


 


Amorphous Sediment  Rare H    (None)  /hpf


 


Urine Bacteria     (None)  /hpf


 


Hyaline Casts  30 H    (0-2)  /lpf














  05/02/20 05/02/20 05/02/20 Range/Units





  19:03 19:03 19:03 


 


WBC     (3.8-10.6)  k/uL


 


RBC     (3.80-5.40)  m/uL


 


Hgb     (11.4-16.0)  gm/dL


 


Hct     (34.0-46.0)  %


 


MCH     (25.0-35.0)  pg


 


MCHC     (31.0-37.0)  g/dL


 


RDW     (11.5-15.5)  %


 


Plt Count     (150-450)  k/uL


 


Neutrophils #     (1.3-7.7)  k/uL


 


Lymphocytes #     (1.0-4.8)  k/uL


 


PT    21.1 H  (9.0-12.0)  sec


 


INR    2.2 H  (<1.2)  


 


APTT    31.5 H  (22.0-30.0)  sec


 


D-Dimer  6.80 H    (<0.60)  mg/L FEU


 


ABG pCO2     (35-45)  mmHg


 


ABG pO2     ()  mmHg


 


ABG HCO3     (21-25)  mmol/L


 


ABG O2 Saturation     (94-97)  %


 


Potassium     (3.5-5.1)  mmol/L


 


Carbon Dioxide     (22-30)  mmol/L


 


BUN     (7-17)  mg/dL


 


Creatinine     (0.52-1.04)  mg/dL


 


Glucose     (74-99)  mg/dL


 


POC Glucose (mg/dL)     (75-99)  mg/dL


 


Plasma Lactic Acid Joaquín     (0.7-2.0)  mmol/L


 


Iron   15 L   ()  ug/dL


 


% Saturation   5.79 L   (12.00-45.00)   


 


C-Reactive Protein   49.8 H   (<10.0)  mg/L


 


Urine Appearance     (Clear)  


 


Urine Protein     (Negative)  


 


Urine Blood     (Negative)  


 


Ur Leukocyte Esterase     (Negative)  


 


Urine RBC     (0-5)  /hpf


 


Urine WBC     (0-5)  /hpf


 


Urine WBC Clumps     (None)  /hpf


 


Amorphous Sediment     (None)  /hpf


 


Urine Bacteria     (None)  /hpf


 


Hyaline Casts     (0-2)  /lpf














  05/02/20 05/02/20 05/02/20 Range/Units





  19:03 19:28 22:48 


 


WBC  12.8 H    (3.8-10.6)  k/uL


 


RBC  3.24 L    (3.80-5.40)  m/uL


 


Hgb  7.8 L    (11.4-16.0)  gm/dL


 


Hct  27.7 L    (34.0-46.0)  %


 


MCH  24.1 L    (25.0-35.0)  pg


 


MCHC  28.2 L    (31.0-37.0)  g/dL


 


RDW  17.7 H    (11.5-15.5)  %


 


Plt Count  92 L    (150-450)  k/uL


 


Neutrophils #     (1.3-7.7)  k/uL


 


Lymphocytes #     (1.0-4.8)  k/uL


 


PT     (9.0-12.0)  sec


 


INR     (<1.2)  


 


APTT     (22.0-30.0)  sec


 


D-Dimer     (<0.60)  mg/L FEU


 


ABG pCO2     (35-45)  mmHg


 


ABG pO2     ()  mmHg


 


ABG HCO3     (21-25)  mmol/L


 


ABG O2 Saturation     (94-97)  %


 


Potassium     (3.5-5.1)  mmol/L


 


Carbon Dioxide     (22-30)  mmol/L


 


BUN     (7-17)  mg/dL


 


Creatinine     (0.52-1.04)  mg/dL


 


Glucose     (74-99)  mg/dL


 


POC Glucose (mg/dL)   219 H   (75-99)  mg/dL


 


Plasma Lactic Acid Joaquín    2.3 H*  (0.7-2.0)  mmol/L


 


Iron     ()  ug/dL


 


% Saturation     (12.00-45.00)   


 


C-Reactive Protein     (<10.0)  mg/L


 


Urine Appearance     (Clear)  


 


Urine Protein     (Negative)  


 


Urine Blood     (Negative)  


 


Ur Leukocyte Esterase     (Negative)  


 


Urine RBC     (0-5)  /hpf


 


Urine WBC     (0-5)  /hpf


 


Urine WBC Clumps     (None)  /hpf


 


Amorphous Sediment     (None)  /hpf


 


Urine Bacteria     (None)  /hpf


 


Hyaline Casts     (0-2)  /lpf














  05/02/20 05/03/20 05/03/20 Range/Units





  23:41 02:53 02:53 


 


WBC    13.2 H  (3.8-10.6)  k/uL


 


RBC    3.16 L  (3.80-5.40)  m/uL


 


Hgb    8.0 L  (11.4-16.0)  gm/dL


 


Hct    27.1 L  (34.0-46.0)  %


 


MCH     (25.0-35.0)  pg


 


MCHC    29.6 L  (31.0-37.0)  g/dL


 


RDW    17.6 H  (11.5-15.5)  %


 


Plt Count    96 L  (150-450)  k/uL


 


Neutrophils #    11.6 H  (1.3-7.7)  k/uL


 


Lymphocytes #    0.9 L  (1.0-4.8)  k/uL


 


PT     (9.0-12.0)  sec


 


INR     (<1.2)  


 


APTT     (22.0-30.0)  sec


 


D-Dimer     (<0.60)  mg/L FEU


 


ABG pCO2     (35-45)  mmHg


 


ABG pO2     ()  mmHg


 


ABG HCO3     (21-25)  mmol/L


 


ABG O2 Saturation     (94-97)  %


 


Potassium   5.2 H   (3.5-5.1)  mmol/L


 


Carbon Dioxide   18 L   (22-30)  mmol/L


 


BUN   83 H   (7-17)  mg/dL


 


Creatinine   2.70 H   (0.52-1.04)  mg/dL


 


Glucose   211 H   (74-99)  mg/dL


 


POC Glucose (mg/dL)  213 H    (75-99)  mg/dL


 


Plasma Lactic Acid Joaquín     (0.7-2.0)  mmol/L


 


Iron     ()  ug/dL


 


% Saturation     (12.00-45.00)   


 


C-Reactive Protein     (<10.0)  mg/L


 


Urine Appearance     (Clear)  


 


Urine Protein     (Negative)  


 


Urine Blood     (Negative)  


 


Ur Leukocyte Esterase     (Negative)  


 


Urine RBC     (0-5)  /hpf


 


Urine WBC     (0-5)  /hpf


 


Urine WBC Clumps     (None)  /hpf


 


Amorphous Sediment     (None)  /hpf


 


Urine Bacteria     (None)  /hpf


 


Hyaline Casts     (0-2)  /lpf














  05/03/20 05/03/20 05/03/20 Range/Units





  02:53 03:15 06:56 


 


WBC     (3.8-10.6)  k/uL


 


RBC     (3.80-5.40)  m/uL


 


Hgb     (11.4-16.0)  gm/dL


 


Hct     (34.0-46.0)  %


 


MCH     (25.0-35.0)  pg


 


MCHC     (31.0-37.0)  g/dL


 


RDW     (11.5-15.5)  %


 


Plt Count     (150-450)  k/uL


 


Neutrophils #     (1.3-7.7)  k/uL


 


Lymphocytes #     (1.0-4.8)  k/uL


 


PT     (9.0-12.0)  sec


 


INR     (<1.2)  


 


APTT     (22.0-30.0)  sec


 


D-Dimer     (<0.60)  mg/L FEU


 


ABG pCO2     (35-45)  mmHg


 


ABG pO2     ()  mmHg


 


ABG HCO3     (21-25)  mmol/L


 


ABG O2 Saturation     (94-97)  %


 


Potassium     (3.5-5.1)  mmol/L


 


Carbon Dioxide     (22-30)  mmol/L


 


BUN     (7-17)  mg/dL


 


Creatinine     (0.52-1.04)  mg/dL


 


Glucose     (74-99)  mg/dL


 


POC Glucose (mg/dL)     (75-99)  mg/dL


 


Plasma Lactic Acid Joaquín  2.2 H*   2.1 H*  (0.7-2.0)  mmol/L


 


Iron     ()  ug/dL


 


% Saturation     (12.00-45.00)   


 


C-Reactive Protein     (<10.0)  mg/L


 


Urine Appearance   Turbid H   (Clear)  


 


Urine Protein   2+ H   (Negative)  


 


Urine Blood   Large H   (Negative)  


 


Ur Leukocyte Esterase   Moderate H   (Negative)  


 


Urine RBC   >182 H   (0-5)  /hpf


 


Urine WBC   >182 H   (0-5)  /hpf


 


Urine WBC Clumps   Many H   (None)  /hpf


 


Amorphous Sediment     (None)  /hpf


 


Urine Bacteria   Moderate H   (None)  /hpf


 


Hyaline Casts     (0-2)  /lpf














  05/03/20 Range/Units





  08:27 


 


WBC   (3.8-10.6)  k/uL


 


RBC   (3.80-5.40)  m/uL


 


Hgb   (11.4-16.0)  gm/dL


 


Hct   (34.0-46.0)  %


 


MCH   (25.0-35.0)  pg


 


MCHC   (31.0-37.0)  g/dL


 


RDW   (11.5-15.5)  %


 


Plt Count   (150-450)  k/uL


 


Neutrophils #   (1.3-7.7)  k/uL


 


Lymphocytes #   (1.0-4.8)  k/uL


 


PT   (9.0-12.0)  sec


 


INR   (<1.2)  


 


APTT   (22.0-30.0)  sec


 


D-Dimer   (<0.60)  mg/L FEU


 


ABG pCO2  32 L  (35-45)  mmHg


 


ABG pO2  58 L*  ()  mmHg


 


ABG HCO3  18 L  (21-25)  mmol/L


 


ABG O2 Saturation  89.4 L  (94-97)  %


 


Potassium   (3.5-5.1)  mmol/L


 


Carbon Dioxide   (22-30)  mmol/L


 


BUN   (7-17)  mg/dL


 


Creatinine   (0.52-1.04)  mg/dL


 


Glucose   (74-99)  mg/dL


 


POC Glucose (mg/dL)   (75-99)  mg/dL


 


Plasma Lactic Acid Joaquín   (0.7-2.0)  mmol/L


 


Iron   ()  ug/dL


 


% Saturation   (12.00-45.00)   


 


C-Reactive Protein   (<10.0)  mg/L


 


Urine Appearance   (Clear)  


 


Urine Protein   (Negative)  


 


Urine Blood   (Negative)  


 


Ur Leukocyte Esterase   (Negative)  


 


Urine RBC   (0-5)  /hpf


 


Urine WBC   (0-5)  /hpf


 


Urine WBC Clumps   (None)  /hpf


 


Amorphous Sediment   (None)  /hpf


 


Urine Bacteria   (None)  /hpf


 


Hyaline Casts   (0-2)  /lpf








                      Microbiology - Last 24 Hours (Table)











 05/02/20 15:00 Urine Culture - Preliminary





 Urine,Clean Catch 


 


 04/30/20 12:55 Anaerobic Culture - Preliminary





 Paracentesis Fluid 


 


 04/30/20 14:07 Gram Stain - Preliminary





 Ascites Fluid Body Fluid Culture - Preliminary


 


 04/28/20 19:10 Blood Culture - Preliminary





 Blood    No Growth after 96 hours














Assessment and Plan


Assessment: 





Impression:


Shortness of breath secondary to diastolic congestive heart failure, doubt 

bacterial pneumonia.  Pro calcitonin level is not significantly enlarged.  

However her BNP level is significantly high.  Patient will be given more 

diuretics today.  Her chest x-ray worsened after she received fluid boluses for 

low blood pressure.


Acute hypoxic respiratory failure secondary to above/diastolic congestive heart 

failure and pulmonary edema, underlying pneumonia or underlying Covid 19 

pneumonitis is not entirely ruled out but felt to be less likely.


Bilateral infiltrates, negative covid 19, but I don't believe is entirely ruled 

out.


Acute on chronic renal failure


Ongoing ascites, status post paracentesis and 2.5 L of fluid were drained.


Acute metabolic encephalopathy secondary to hypoxemia and acute hypoxic 

respiratory failure.


Paroxysmal atrial fibrillation


History of chronic liver disease and nonalcoholic cirrhosis of the liver


History of recurrent ascites


History of vancomycin-resistant enterococcal infection


History of depression


Obesity with body index of 32.3.


History of recurrent urinary tract infections.





Recommendation:


Continue to monitor the patient in the ICU.


Give the patient diuretics.


Cut down her IV fluid to KVO.


Use norepinephrine for low blood pressure.  And consider a central line 

placement today.


Consider intubation however family clearly requested DO NOT RESUSCITATE CODE 

STATUS.


Bronchodilators.


IV Solu-Medrol.


Empiric antibiotics.


Prognosis is extremely poor and guarded.


We'll continue to follow.


Critical care time is 35 minutes.


Time with Patient: Greater than 30

## 2020-05-04 VITALS — HEART RATE: 38 BPM | RESPIRATION RATE: 6 BRPM

## 2020-05-04 VITALS — TEMPERATURE: 97.5 F

## 2020-05-04 LAB
ALBUMIN SERPL-MCNC: 3.5 G/DL (ref 3.5–5)
ALP SERPL-CCNC: 58 U/L (ref 38–126)
ALT SERPL-CCNC: 21 U/L (ref 4–34)
ANION GAP SERPL CALC-SCNC: 19 MMOL/L
AST SERPL-CCNC: 43 U/L (ref 14–36)
BASOPHILS # BLD AUTO: 0 K/UL (ref 0–0.2)
BASOPHILS NFR BLD AUTO: 0 %
BUN SERPL-SCNC: 90 MG/DL (ref 7–17)
CALCIUM SPEC-MCNC: 8.6 MG/DL (ref 8.4–10.2)
CHLORIDE SERPL-SCNC: 104 MMOL/L (ref 98–107)
CO2 SERPL-SCNC: 15 MMOL/L (ref 22–30)
EOSINOPHIL # BLD AUTO: 0 K/UL (ref 0–0.7)
EOSINOPHIL NFR BLD AUTO: 0 %
ERYTHROCYTE [DISTWIDTH] IN BLOOD BY AUTOMATED COUNT: 2.94 M/UL (ref 3.8–5.4)
ERYTHROCYTE [DISTWIDTH] IN BLOOD: 17.9 % (ref 11.5–15.5)
GLUCOSE BLD-MCNC: 242 MG/DL (ref 75–99)
GLUCOSE BLD-MCNC: 260 MG/DL (ref 75–99)
GLUCOSE BLD-MCNC: 264 MG/DL (ref 75–99)
GLUCOSE SERPL-MCNC: 246 MG/DL (ref 74–99)
HCT VFR BLD AUTO: 25.4 % (ref 34–46)
HGB BLD-MCNC: 7.3 GM/DL (ref 11.4–16)
INR PPP: 3.8 (ref ?–1.2)
LYMPHOCYTES # SPEC AUTO: 0.5 K/UL (ref 1–4.8)
LYMPHOCYTES NFR SPEC AUTO: 4 %
MCH RBC QN AUTO: 24.7 PG (ref 25–35)
MCHC RBC AUTO-ENTMCNC: 28.6 G/DL (ref 31–37)
MCV RBC AUTO: 86.4 FL (ref 80–100)
MONOCYTES # BLD AUTO: 0.5 K/UL (ref 0–1)
MONOCYTES NFR BLD AUTO: 4 %
NEUTROPHILS # BLD AUTO: 12.7 K/UL (ref 1.3–7.7)
NEUTROPHILS NFR BLD AUTO: 91 %
PLATELET # BLD AUTO: 129 K/UL (ref 150–450)
POTASSIUM SERPL-SCNC: 5.2 MMOL/L (ref 3.5–5.1)
PROT SERPL-MCNC: 6 G/DL (ref 6.3–8.2)
PT BLD: 37.2 SEC (ref 9–12)
SODIUM SERPL-SCNC: 138 MMOL/L (ref 137–145)
WBC # BLD AUTO: 14 K/UL (ref 3.8–10.6)

## 2020-05-04 RX ADMIN — INSULIN ASPART SCH UNIT: 100 INJECTION, SOLUTION INTRAVENOUS; SUBCUTANEOUS at 05:44

## 2020-05-04 RX ADMIN — ALBUTEROL SULFATE SCH PUFF: 90 AEROSOL, METERED RESPIRATORY (INHALATION) at 07:32

## 2020-05-04 RX ADMIN — INSULIN ASPART SCH UNIT: 100 INJECTION, SOLUTION INTRAVENOUS; SUBCUTANEOUS at 00:33

## 2020-05-04 RX ADMIN — METHYLPREDNISOLONE SODIUM SUCCINATE SCH MG: 125 INJECTION, POWDER, FOR SOLUTION INTRAMUSCULAR; INTRAVENOUS at 05:47

## 2020-05-04 RX ADMIN — PIPERACILLIN AND TAZOBACTAM SCH MLS/HR: 3; .375 INJECTION, POWDER, FOR SOLUTION INTRAVENOUS at 05:44

## 2020-05-04 RX ADMIN — CEFAZOLIN SCH: 330 INJECTION, POWDER, FOR SOLUTION INTRAMUSCULAR; INTRAVENOUS at 10:00

## 2020-05-04 RX ADMIN — METHYLPREDNISOLONE SODIUM SUCCINATE SCH MG: 125 INJECTION, POWDER, FOR SOLUTION INTRAMUSCULAR; INTRAVENOUS at 00:31

## 2020-05-04 RX ADMIN — NOREPINEPHRINE BITARTRATE SCH MLS/HR: 1 INJECTION, SOLUTION, CONCENTRATE INTRAVENOUS at 00:38

## 2020-05-04 RX ADMIN — DILTIAZEM HYDROCHLORIDE SCH: 5 INJECTION INTRAVENOUS at 10:00

## 2020-05-04 RX ADMIN — FUROSEMIDE SCH MLS/HR: 10 INJECTION, SOLUTION INTRAMUSCULAR; INTRAVENOUS at 05:16

## 2020-05-04 NOTE — DS
DISCHARGE SUMMARY



PRELIMINARY CAUSE OF DEATH:

Congestive heart failure, acute exacerbation, with acute on chronic diastolic

dysfunction, ejection fraction 50% to 60%.



OTHER CONTRIBUTING FACTORS,:

1. Possible COVID-19 pneumonia, bilateral, with acute hypoxic respiratory failure with

    possible sepsis, hypotension, septic shock.

2. COVID test negative. Clinical suspicion noted.

3. Change in mental status, acute metabolic encephalopathy, acute on chronic.

4. Hyperkalemia secondary to renal failure.

5. Acute renal failure with acute tubular necrosis.

6. Baseline chronic kidney disease, stage III possibly.

7. Elevated lactic acid, possibly secondary to sepsis, present on admission, secondary

    to pneumonia.

8. Troponin 0.06, indeterminate.

9. Hypoalbuminemia.

10.Hyponatremia.

11.Change in mental status, as mentioned earlier.

12.Paroxysmal atrial fibrillation, on Eliquis.

13.Generalized gait dysfunction.

14.Increased white count.

15.Anemia, normocytic.

16.History of multiple falls recently.

17.History of congestive heart failure.

18.History of diabetes mellitus, type 2.

19.Hypertension.

20.History of chronic liver disease and nonalcoholic cirrhosis of the liver with

    multiple abdominal paracenteses.

21.History of urinary tract infection.

22.History of recurrent ascites and abdominal paracenteses.

23.History of thrombocytopenia.

24.History of left breast cancer with lumpectomy.

25.History of vancomycin-resistant Enterococcus.

26.History of depression.

27.History of cholecystectomy.

28.Obesity with body mass index of 32.6.

29.FULL CODE.



HISTORY OF PRESENT ILLNESS:

This 79-year-old woman with a past medical history of multiple medical problems was

admitted with CHF, acute exacerbation. Patient also had bilateral lung lesions.  The

patient also had multiple complications with possible pneumonia and sepsis. COVID-19

test was negative, but clinical suspicion was noted, and the patient was treated

accordingly. Despite medical treatment, the patient's condition got worse.  The patient

was on IV Lasix drip.  Patient was transferred to ICU.  Sensorium was fluctuating and

the patient took a turn for the worse and subsequently the patient was made comfort

measures. The patient  because of the above-mentioned illnesses.  Once again,

the prognosis remained extremely guarded throughout the hospital stay.  Please refer to

multiple consultation and progress notes for further details.





MMODL / IJN: [id number] / Job#: [id number]

## 2020-05-04 NOTE — PN
PROGRESS NOTE



PULMONARY/CRITICAL CARE PROGRESS NOTE:



DATE OF SERVICE:

05/04/2020.



CRITICAL CARE TIME:  32 minutes



This is a 79-year-old female who was admitted back on April 28.  She came in 
with a

number of medical problems including non ST-segment elevation myocardial 
infarction,

atrial fibrillation, mental status changes, hypercapnic respiratory failure, 
ascites,

acute kidney injury, and nonalcoholic steatohepatitis.  The patient is a NO 
CODE.

Currently, she is on BiPAP at 11 and 4 at 90% with saturations in the high 80s,

norepinephrine at 0.06 mcg/kg/per minute, Cardizem drip a 10 mg an hour, saline 
at 10

mL an hour and Lasix drip a 10 mg an hour.  She is also getting D5W with 3 amps 
of

sodium bicarbonate at 50 mL an hour.  The patient was to have an LP and MRI 
today.  I

did have a conversation with the patient's .  The patient's  would
like

to make his wife comfort measures only.  I think it is very appropriate.



The patient otherwise is very lethargic and sleepy.  She barely responds.



Current vital signs are reviewed.  Temperature 97.5 axillary, heart rate 104,

respiratory rate 20, blood pressure 101/41 saturations are 87%.  Appears quite

tachypneic and dyspneic.  BiPAP mask in place.

HEENT: Examination is grossly unremarkable.  The patient is very poorly 
responsive.  He

is almost not gasping respirations.  BiPAP mask in place.

NECK:  Supple full range of motion.  No adenopathy.  Neck veins are flat.

CARDIOVASCULAR: Examination reveals rate of 103 beats per minute.  It is 
relatively

substrate is the heart rate is regular.  Heart rate about 103 beats per minute. 
S1, S2

normal.  Heart sounds are distant.

LUNGS: Reveal coarse bilateral rhonchi and crackles.  Breath sounds equal but

diminished throughout.

ABDOMEN:  Obese.  He has weeping from the pre recent paracentesis site.  
Extremities

reveals profound anasarca and edema.  Her skin is weeping fluid.  There are some

chronic venous stasis changes.

NEUROLOGIC: Examination could not be adequately assessed.  The patient is very

lethargic and somnolent.  Barely respond.



LAB DATA:

Reviewed.  White count 14, hemoglobin 7.3, hematocrit 25.4, platelet count 120 9
pounds

and INR were 37.2 and 3.8.  Sodium 138, potassium 5.6, chloride 104 CO2 15 anion
gap is

19, BUN and creatinine were 93.21.  AST 1.9.  Albumin 3.5.

Microbiology is negative including urine blood and ascitic fluid evaluation.



Chest x-ray from May 4 shows bilateral extensive consolidations and bilateral

effusions.  Chest x-ray consistent with fluid overload and/or pneumonia.



Medications are reviewed.  In addition to norepinephrine, Cardizem drip and 
Lasix drip,

as well as D5W with 3 amps of bicarb at 50 mL an hour, the patient is on her 
prior

usual medications.



ASSESSMENT:

1. Shortness of breath, multifactorial, in part related to diastolic congestive 
heart

    failure as well as cardiogenic pulmonary edema secondary to hypoalbuminemia 
and

    liver failure.

2. Acute hypoxemic respiratory failure requiring BiPAP therapy.

3. Negative COVID-19 status.

4. Acute on chronic renal failure.

5. Significant abdominal ascites, status post frequent paracentesis.

6. Acute metabolic encephalopathy.

7. Paroxysmal atrial fibrillation.

8. History of chronic liver disease and nonalcoholic cirrhosis.

9. History of recurrent ascites.

10.History of vancomycin-resistant enterococcal infection.

11.History of depression.

12.Obesity.

13.History of recurrent urinary tract infections.



PLAN:

The patient's overall prognosis is very poor.  I will speak to the  about
code

status and about comfort measures.  She is already a NO CODE.  The patient 
remains on

norepinephrine at 0.06 mcg/kg per minutes and Cardizem drip at 10 mg an hours as
well

as Lasix drip a 10 mg an hour.  The patient is receiving saline at 10 mL an hour
D5W

with 3 amps of bicarb at 50 mL an hour as per Nephrology.  Overall prognosis is 
very

poor.  She has diffuse edema and anasarca.  I will talk to the  about 
comfort

measures.  Additional recommendations and suggestions are forthcoming.



CRITICAL CARE TIME:  32 minutes.





SARAL / IJN: [id number] / Job#: [id number]

MTDD

## 2020-05-04 NOTE — XR
EXAMINATION TYPE: XR chest 1V

 

DATE OF EXAM: 5/4/2020

 

COMPARISON: 5/3/2020

 

HISTORY: Respiratory distress

 

TECHNIQUE: Single frontal view of the chest is obtained.

 

FINDINGS:  Improving peripheral aspect of the bilateral patchy alveolar consolidations. Some improvem
ent at the right lung base as well. Left-sided subclavian approach central venous catheter terminates
 in the right atrium. Cardiomediastinal silhouette is mildly enlarged. Surgical clips in the left axi
lla are noted. Trace pleural effusions.

 

IMPRESSION:  Improving bilateral extensive multifocal consolidations likely on the basis of multifoca
l pneumonia versus congestive heart failure

## 2020-05-04 NOTE — P.PN
Subjective


This is Gissel Rangel PA-C scribing on behalf of Dr. Escalante


Chart review and observation only, Dr. Escalante did not examine the patient 

because the patient is thought to be a possible covid patient 


He did discuss the patient with the nurse





HPI/interval history


Patient is a 79-year-old female with a history of paroxysmal atrial 

fibrillation, hypertension, dyslipidemia, diabetes, nonalcoholic cirrhosis, CAD 

who presented with complaints of shortness of breath.  She was found to be in 

atrial fibrillation with RVR.  She was admitted for treatment of CHF and 

pneumonia.  She remains in the ICU.  Staff will be discussing code status and 

plan of care today, considering comfort care.  She has an A. fib with rates in 

the 90s to 100s.





EXAMINATION


She is afebrile, rates in the low 100s, blood pressure in the 100s over 40s, 

oxygen saturation 90% BiPAP


Patient was not examined, possible Covid patient





REVIEW OF LABS, ECG


CBC 14.0, hemoglobin 7.3, platelets 129, calcium 5.2, BUN 90, creatinine 3.21





IMPRESSION / ASSESSMENT: 


# Shortness of breath, multifactorial, secondary to diastolic congestive heart 

failure, acute on chronic, pneumonia, bilateral infiltrates, negative covid 19, 

although this cannot be completely ruled out


#JENI on CKD, BUN and creatinine rising nephrology following


#Hyperkalemia


# Metabolic encephalopathy related mostly to her underlying nonalcoholic liver 

cirrhosis


#Paroxysmal atrial fibrillation, rates in the 90s to low 100s


#History of chronic liver disease and nonalcoholic cirrhosis of the liver


#History of recurrent ascites


#diabetes 


# hypertension, currently hypotensive


# dyslipidemia


# abnormal stress test in March of this year, suggesting possible underlying 

coronary artery disease 





PLAN:


continue current regimen for now, if patient is made comfort care then medical 

management per medical doctor 











Objective





- Vital Signs


Vital signs: 


                                   Vital Signs











Temp  97.5 F L  05/04/20 04:00


 


Pulse  98   05/04/20 07:00


 


Resp  16   05/04/20 07:00


 


BP  102/70   05/03/20 13:00


 


Pulse Ox  92 L  05/04/20 07:00








                                 Intake & Output











 05/03/20 05/04/20 05/04/20





 18:59 06:59 18:59


 


Intake Total 4407.404 9556.130 92.5


 


Output Total 135 70 4


 


Balance 050.119 2061.130 88.5


 


Weight  105.6 kg 


 


Intake:   


 


   970 92.5


 


    Albumin Human 25% 50 ml 200  





    In Empty Bag 1 bag @ 200   





    mls/hr IVPB Q15M [facility] Rx#:   





    [id number]   


 


    Cardizem  100 10


 


    Cefepime 2 gm In Sodium 100  





    Chloride 0.9% 100 ml @   





    200 mls/hr IVPB Q24H [facility]   





    Rx#:[id number]   


 


    Dextrose 5% in Water 1, 200 550 50





    000 ml @ 50 mls/hr IV .   





    Q23H [facility] with Sodium   





    Bicarb (1 Meq/ml) 150 ml   





    Rx#:[id number]   


 


    Furosemide 100 mg In 60 110 10





    Sodium Chloride 0.9% 90   





    ml @ 10 MG/HR 10 mls/hr   





    IV .Q10H [facility] Rx#:   





    [id number]   


 


    Sodium Chloride 0.9% 1, 290 110 10





    000 ml @ 20 mls/hr IV .   





    Q24H [facility] Rx#:[id number]   


 


    Zosyn  100 12.5


 


  Intake, IV Titration 228.692 357.130 





  Amount   


 


    Diltiazem 125 mg In 121.25  





    Sodium Chloride 0.9% 100   





    ml @ 5 MG/HR 5 mls/hr IV   





    .Q24H [facility] Rx#:[id number]   


 


    Furosemide 100 mg In  162.834 





    Sodium Chloride 0.9% 90   





    ml @ 10 MG/HR 10 mls/hr   





    IV .Q10H [facility] Rx#:   





    [id number]   


 


    Norepinephrine 4 mg In 107.442 194.296 





    Sodium Chloride 0.9% 250   





    ml @ 0.05 MCG/KG/MIN 17.   





    907 mls/hr IV .[id number]   





    [facility] Rx#:[id number]   


 


Output:   


 


  Urine 135 70 4


 


Other:   


 


  Voiding Method Indwelling Catheter Indwelling Catheter 


 


  # Voids 22  








                       ABP, PAP, CO, CI - Last Documented











Arterial Blood Pressure        107/43

















- Labs


CBC & Chem 7: 


                                 05/04/20 06:07





                                 05/04/20 06:07


Labs: 


                  Abnormal Lab Results - Last 24 Hours (Table)











  05/03/20 05/03/20 05/03/20 Range/Units





  12:58 18:13 20:11 


 


WBC     (3.8-10.6)  k/uL


 


RBC     (3.80-5.40)  m/uL


 


Hgb     (11.4-16.0)  gm/dL


 


Hct     (34.0-46.0)  %


 


MCH     (25.0-35.0)  pg


 


MCHC     (31.0-37.0)  g/dL


 


RDW     (11.5-15.5)  %


 


Plt Count     (150-450)  k/uL


 


Neutrophils #     (1.3-7.7)  k/uL


 


Lymphocytes #     (1.0-4.8)  k/uL


 


PT     (9.0-12.0)  sec


 


INR     (<1.2)  


 


ABG pH  7.27 L    (7.35-7.45)  


 


ABG pO2  82 L    ()  mmHg


 


ABG HCO3  17 L    (21-25)  mmol/L


 


ABG Total CO2  18 L    (19-24)  mmol/L


 


Potassium     (3.5-5.1)  mmol/L


 


Carbon Dioxide     (22-30)  mmol/L


 


BUN     (7-17)  mg/dL


 


Creatinine     (0.52-1.04)  mg/dL


 


Glucose     (74-99)  mg/dL


 


POC Glucose (mg/dL)   238 H  252 H  (75-99)  mg/dL


 


Total Bilirubin     (0.2-1.3)  mg/dL


 


AST     (14-36)  U/L


 


Total Protein     (6.3-8.2)  g/dL














  05/04/20 05/04/20 05/04/20 Range/Units





  00:07 04:03 06:07 


 


WBC    14.0 H  (3.8-10.6)  k/uL


 


RBC    2.94 L  (3.80-5.40)  m/uL


 


Hgb    7.3 L  (11.4-16.0)  gm/dL


 


Hct    25.4 L  (34.0-46.0)  %


 


MCH    24.7 L  (25.0-35.0)  pg


 


MCHC    28.6 L  (31.0-37.0)  g/dL


 


RDW    17.9 H  (11.5-15.5)  %


 


Plt Count    129 L  (150-450)  k/uL


 


Neutrophils #    12.7 H  (1.3-7.7)  k/uL


 


Lymphocytes #    0.5 L  (1.0-4.8)  k/uL


 


PT     (9.0-12.0)  sec


 


INR     (<1.2)  


 


ABG pH     (7.35-7.45)  


 


ABG pO2     ()  mmHg


 


ABG HCO3     (21-25)  mmol/L


 


ABG Total CO2     (19-24)  mmol/L


 


Potassium     (3.5-5.1)  mmol/L


 


Carbon Dioxide     (22-30)  mmol/L


 


BUN     (7-17)  mg/dL


 


Creatinine     (0.52-1.04)  mg/dL


 


Glucose     (74-99)  mg/dL


 


POC Glucose (mg/dL)  242 H  264 H   (75-99)  mg/dL


 


Total Bilirubin     (0.2-1.3)  mg/dL


 


AST     (14-36)  U/L


 


Total Protein     (6.3-8.2)  g/dL














  05/04/20 05/04/20 05/04/20 Range/Units





  06:07 06:07 07:55 


 


WBC     (3.8-10.6)  k/uL


 


RBC     (3.80-5.40)  m/uL


 


Hgb     (11.4-16.0)  gm/dL


 


Hct     (34.0-46.0)  %


 


MCH     (25.0-35.0)  pg


 


MCHC     (31.0-37.0)  g/dL


 


RDW     (11.5-15.5)  %


 


Plt Count     (150-450)  k/uL


 


Neutrophils #     (1.3-7.7)  k/uL


 


Lymphocytes #     (1.0-4.8)  k/uL


 


PT  37.2 H    (9.0-12.0)  sec


 


INR  3.8 H    (<1.2)  


 


ABG pH     (7.35-7.45)  


 


ABG pO2     ()  mmHg


 


ABG HCO3     (21-25)  mmol/L


 


ABG Total CO2     (19-24)  mmol/L


 


Potassium   5.2 H   (3.5-5.1)  mmol/L


 


Carbon Dioxide   15 L   (22-30)  mmol/L


 


BUN   90 H   (7-17)  mg/dL


 


Creatinine   3.21 H   (0.52-1.04)  mg/dL


 


Glucose   246 H   (74-99)  mg/dL


 


POC Glucose (mg/dL)    260 H  (75-99)  mg/dL


 


Total Bilirubin   1.9 H   (0.2-1.3)  mg/dL


 


AST   43 H   (14-36)  U/L


 


Total Protein   6.0 L   (6.3-8.2)  g/dL








                      Microbiology - Last 24 Hours (Table)











 04/28/20 19:10 Blood Culture - Preliminary





 Blood    No Growth after 120 hours


 


 04/30/20 14:07 Gram Stain - Preliminary





 Ascites Fluid Body Fluid Culture - Preliminary


 


 05/02/20 15:00 Urine Culture - Final





 Urine,Clean Catch 


 


 05/02/20 14:11 Blood Culture - Preliminary





 Blood    No Growth after 24 hours

## 2020-05-04 NOTE — PCN
PROCEDURE NOTE



OPERATIVE REPORT:

Placement of a right brachial arterial line.



PREOPERATIVE DIAGNOSIS:

Acute hypoxic respiratory failure and hypotension requiring norepinephrine.



POSTOPERATIVE DIAGNOSIS:

Acute hypoxic respiratory failure and hypotension requiring norepinephrine.



ANESTHESIA USED:

None deployed.



PROCEDURE:

The right brachial area was prepared in a sterile fashion and drapes were applied.  The

right brachial artery was palpated, cannulated, and a guidewire was placed.  A Cook

catheter was inserted over the guidewire, and the guidewire was removed.  Good blood

flow, good waveform noted.  The line was secured using 3.0 silk sutures.  No evidence

of any complication.





MMODL / IJN: [id number] / Job#: [id number]

## 2020-05-04 NOTE — PCN
PROCEDURE NOTE



OPERATIVE REPORT:

Placement of the left subclavian triple-lumen catheter.



PREOPERATIVE DIAGNOSIS:

Acute hypoxic respiratory failure and pulmonary edema.



POSTOPERATIVE DIAGNOSIS:

Acute hypoxic respiratory failure and pulmonary edema.



ANESTHESIA USED:

2 mL of 1% lidocaine.



PROCEDURE:

The patient was placed in a supine position, the left subclavian area was prepared in a

sterile fashion.  Drapes were applied.  The area was locally anesthetized, then the

left subclavian vein was cannulated easily using the infraclavicular approach.

Guidewire was placed.  The area around the guidewire was dilated and then a triple-

lumen catheter was inserted over the guidewire, and the guidewire was removed.  Good

flow noted in the 3 different ports of the triple-lumen catheter.  Procedure was well

tolerated.  No evidence of any immediate complications.  Chest x-ray showed no evidence

of complication.





MMBRUCE / IJN: [id number] / Job#: [id number]

## 2020-05-05 LAB — PO2 BLDA: 58 MMHG (ref 83–108)

## 2020-05-08 NOTE — CDI
Documentation Clarification Form



Date: 05/08/2020 08:01:27 AM

From: Lizeth Boateng

Phone: If you have a question about this query, please contact Janell Willis 
 at [phone] between 8am and 5pm.

MRN: [id number]

Admit Date: 04/28/2020 04:42:00 PM

Patient Name: Jessica Samaniego

Visit Number: [id number]

Discharge Date:  05/04/2020 11:07:00 AM





ATTENTION: The Clinical Documentation Specialists (CDI) and [facility] Coding Staff 
appreciate your assistance in clarifying documentation. Please respond to the 
clarification below the line at the bottom and electronically sign. The CDI & 
[facility] Coding staff will review the response and follow-up if needed. Please note: 
Queries are made part of the Legal Health Record. If you have any questions, 
please contact the author of this message via ITS.

Dr. Mile Lundy



Myocardial infarction is documented in Dr. Hennessy's 5/4 PN  "Admitted with 
NSTEMI".  DCS documents troponin intermediate.  Please clarify if patient had a 
NSTEMI or was this ruled out.

Patient History/Risk Factors:  suspected Covid, septic shock, pneumonia, A fib 
on Coumadin with hematuria, elevated troponin, HTN A/C diastolic CHF HLD.

Clinical Indicators:

Troponin:  0.069

EKG Results: Atrial Fib  anterior and inferior infarct age undetermined

Consult: Cardiology possible MI on 3/11 and abnormal stress possibly indicating 
CAD



In order to capture the severity of condition and necessary documentation 
specificity, please clarify if patient had NSTEMI or was this ruled out:



Type of Infarction:

STEMI

NSTEMI

MI ruled out

Unable to determine

Other Condition, please specify





____________________________________________________________________________

Unable to determine

MTDD

## 2020-05-15 NOTE — CDI
Documentation Clarification Form



Date: 05/15/2020 06:19:01 AM

From: Lizeth Boateng

Phone: To: Lizeth Boateng

If you have a question about this query, please contact Janell Willis Coding 
Manager at [phone] between 8am and 5pm.

MRN: [id number]

Admit Date: 04/28/2020 04:42:00 PM

Patient Name: Jessica Samaniego

Visit Number: [id number]

Discharge Date:  05/04/2020 11:07:00 AM





ATTENTION: The Clinical Documentation Specialists (CDI) and [facility] Coding Staff 
appreciate your assistance in clarifying documentation. Please respond to the 
clarification below the line at the bottom and electronically sign. The CDI & 
[facility] Coding staff will review the response and follow-up if needed. Please note: 
Queries are made part of the Legal Health Record. If you have any questions, 
please contact the author of this message via ITS.



Dr. Jamir Evans



Your patient has the documented diagnosis of gross hematuria second to cath 
trauma aggravated by anticoagulat in your notes dated 05/03/20.



Please clarify the cause of the cath trauma.

 

Treatment:  Nothing needs to be done as long as urine clears.  Urine was clear 
on admission.



Please clarify the cause of the catheter trauma causing the gross hematuria:

Traumatic insertion of Lobo catheter

Did patient pull catheter out

Did catheter get caught on an object

 Other explanation of clinical findings (please specify) _________

 Unable to determine (no explanation for clinical fin

_________________________________________________________________________the 
catheters presence can irritate the mucosa causing bleeding  without obvious 
cause__

MTDD

## 2020-05-21 NOTE — CDI
Documentation Clarification Form



Date:  2020

CDS: Luz Bains, CCS, CCDS         Phone: [phone]

Admit Date: 2020   

Account #: [id number]

Patient Name: Jessica Samaniego 

Discharge/Expiration Date: 2020



   

ATTENTION: The Clinical Documentation Specialists (CDI) and [facility] Coding Staff 
appreciate your assistance in clarifying documentation. Please respond to the 
clarification below the line at the bottom and electronically sign. The CDI & 
[facility] Coding staff will review the response and follow-up if needed. Please note: 
Queries are made part of the Legal Health Record. If you have any questions, 
please contact the author of this message via ITS.



Dear Dr. Lundy: 



Coding guidelines do not allow coding professionals to code based on laboratory 
results; therefore, your input is requested. 



The COVID-19 test obtained on  2020 was reported as Negative on 2020.



Per case summary: 

History & Physical 2020 and subsequent Progress Notes on ,  & : 
"Shortness of breath, possible bilateral pneumonia, possibly community- 
acquired, possibly COVID-19-associated pneumonia. Suspected COVID-19; tested 
negative."

Discharge Summary and Preliminary Cause of Death on : "Possible COVID-19 
pneumonia, bilateral, with acute hypoxic respiratory failure with possible 
sepsis, hypotension, septic shock. COVID test negative. HISTORY OF PRESENT 
ILLNESS: COVID-19 test was negative, but clinical suspicion was noted, and the 
patient was treated accordingly."



Patient history/risk factors: Multiple falls, Paroxysmal atrial fibrillation, 
Diastolic CHF, DM II, Hypertension, CKD II, Chronic non-alcoholic liver disease 
with recurrent ascites requiring paracentesis, UTIs, Thrombocytopenia, Left 
breast cancer, Depression, Obesity with BMI 32.3.



Clinical Indicators: ED note : "78 yo female brought to the emergency 
department from home. According to EMS patient was recently admitted to a large 
nursing home. Couple of days ago patient was discharged from nursing home and 
sent home. Patient was short of breath for the last 24-48 hours."

Patient developed acute respiratory failure on , put on BiPAP, made DNR, 
 on .

VS on admission: T 98.1, P 132^, R 22 (SOB), /78,  nrb

CXR 4/28: Diffuse patchy infiltrates in the periphery right lower lobe and left 
perihilar regions. Correlate for atypical pneumonia. Cardiomegaly. Some vascular
prominence may be present and congestive heart failure could be considered 
within the differential.

 COVID TEST: Negative (test was not repeated during this admission)



Treatment : IV fluid 500 mls @ 999 mls/hr x2, IV Heparin, IV Cefepime, IV 
fluid rate 1,000 mls @ 50 mls/hr, IV Vancomycin. IV antibiotics continued. 1: 
IV Lasix, Cardizem drip bolus. /2: IV Cardizem, IV Albumin. 5/3: IV Albumin, IV
Lasix, IV NaBicarb, IV Dextrose, INH Albuterol, IV Zosyn, IV Cardizem.



In order to capture the severity of condition, please clarify the COVID-19 
status:



   COVID-19 ruled out

   False negative, treating for COVID-19 

o   Based on these clinical indicators: ______

   Other, please specify: _____



(Last Form Revision: 2020)



False negative, treating for COVID-19 

MTDD